# Patient Record
Sex: FEMALE | Race: WHITE | Employment: UNEMPLOYED
[De-identification: names, ages, dates, MRNs, and addresses within clinical notes are randomized per-mention and may not be internally consistent; named-entity substitution may affect disease eponyms.]

---

## 2014-05-01 LAB
CHOLEST SERPL-MCNC: 131 MG/DL (ref 0–199)
HDLC SERPL-MCNC: 36 MG/DL
LDLC SERPL CALC-MCNC: 59 MG/DL (ref 0–129)
NONHDLC SERPL-MCNC: 95 MG/DL (ref 0–159)
TRIGL SERPL-MCNC: 194 MG/DL (ref 0–149)

## 2015-05-15 LAB
HDLC SERPL-MCNC: 29 MG/DL
NONHDLC SERPL-MCNC: 89 MG/DL (ref 0–159)

## 2015-10-22 LAB
CREAT SERPL-MCNC: 0.67 MG/DL (ref 0.55–1.02)
GFR SERPL CREATININE-BSD FRML MDRD: >60 ML/MIN/1.73M2

## 2016-05-26 LAB
GLUCOSE SERPL-MCNC: 217 MG/DL (ref 70–180)
HBA1C MFR BLD: 9.2 % (ref 4.3–5.6)
POTASSIUM SERPL-SCNC: 4.6 MMOL/L (ref 3.5–5.1)

## 2016-10-07 LAB — HBA1C MFR BLD: 8.4 % (ref 4.3–5.6)

## 2017-06-17 ENCOUNTER — HEALTH MAINTENANCE LETTER (OUTPATIENT)
Age: 62
End: 2017-06-17

## 2017-06-22 LAB
CREAT SERPL-MCNC: 0.52 MG/DL (ref 0.55–1.02)
CREAT SERPL-MCNC: 0.52 MG/DL (ref 0.55–1.02)
GFR SERPL CREATININE-BSD FRML MDRD: >60 ML/MIN/1.73M2
GFR SERPL CREATININE-BSD FRML MDRD: >60 ML/MIN/1.73M2
GLUCOSE SERPL-MCNC: 380 MG/DL (ref 70–180)
HBA1C MFR BLD: 8.5 % (ref 4.3–5.6)
HDLC SERPL-MCNC: 32 MG/DL
NONHDLC SERPL-MCNC: 89 MG/DL (ref 0–149)
POTASSIUM SERPL-SCNC: 4.7 MMOL/L (ref 3.5–5.1)
POTASSIUM SERPL-SCNC: 4.7 MMOL/L (ref 3.5–5.1)

## 2017-12-08 ENCOUNTER — TRANSFERRED RECORDS (OUTPATIENT)
Dept: HEALTH INFORMATION MANAGEMENT | Facility: CLINIC | Age: 62
End: 2017-12-08

## 2017-12-08 LAB
ALT SERPL-CCNC: 42 IU/L (ref 5–46)
AST SERPL-CCNC: 66 U/L (ref 10–41)
CREAT SERPL-MCNC: 0.57 MG/DL (ref 0.6–1.4)
GFR SERPL CREATININE-BSD FRML MDRD: 99 ML/MIN/1.73M2
GLUCOSE SERPL-MCNC: 350 MG/DL (ref 65–99)
HBA1C MFR BLD: 9.5 % (ref 0–5.7)
POTASSIUM SERPL-SCNC: 4.4 MMOL/L (ref 3.5–5.2)
TSH SERPL-ACNC: 0.94 MU/L (ref 0.45–4.5)

## 2018-04-13 ENCOUNTER — TRANSFERRED RECORDS (OUTPATIENT)
Dept: HEALTH INFORMATION MANAGEMENT | Facility: CLINIC | Age: 63
End: 2018-04-13

## 2018-06-05 ENCOUNTER — APPOINTMENT (OUTPATIENT)
Dept: ULTRASOUND IMAGING | Facility: CLINIC | Age: 63
DRG: 744 | End: 2018-06-05
Attending: EMERGENCY MEDICINE
Payer: COMMERCIAL

## 2018-06-05 ENCOUNTER — TRANSFERRED RECORDS (OUTPATIENT)
Dept: HEALTH INFORMATION MANAGEMENT | Facility: CLINIC | Age: 63
End: 2018-06-05

## 2018-06-05 ENCOUNTER — ANESTHESIA EVENT (OUTPATIENT)
Dept: SURGERY | Facility: CLINIC | Age: 63
DRG: 744 | End: 2018-06-05
Payer: COMMERCIAL

## 2018-06-05 ENCOUNTER — HOSPITAL ENCOUNTER (INPATIENT)
Facility: CLINIC | Age: 63
LOS: 3 days | Discharge: HOME | DRG: 744 | End: 2018-06-08
Attending: EMERGENCY MEDICINE | Admitting: HOSPITALIST
Payer: COMMERCIAL

## 2018-06-05 DIAGNOSIS — R93.89 ABNORMAL PELVIC ULTRASOUND: ICD-10-CM

## 2018-06-05 DIAGNOSIS — N93.9 VAGINAL BLEEDING: ICD-10-CM

## 2018-06-05 DIAGNOSIS — D64.9 ANEMIA, UNSPECIFIED TYPE: Primary | ICD-10-CM

## 2018-06-05 DIAGNOSIS — D69.6 THROMBOCYTOPENIA (H): ICD-10-CM

## 2018-06-05 LAB
ABO + RH BLD: NORMAL
ABO + RH BLD: NORMAL
ANION GAP SERPL CALCULATED.3IONS-SCNC: 9 MMOL/L (ref 3–14)
APTT PPP: 35 SEC (ref 22–37)
BASOPHILS # BLD AUTO: 0 10E9/L (ref 0–0.2)
BASOPHILS NFR BLD AUTO: 0.3 %
BLD GP AB SCN SERPL QL: NORMAL
BLD PROD TYP BPU: NORMAL
BLD UNIT ID BPU: 0
BLOOD BANK CMNT PATIENT-IMP: NORMAL
BLOOD PRODUCT CODE: NORMAL
BPU ID: NORMAL
BUN SERPL-MCNC: 10 MG/DL (ref 7–30)
CALCIUM SERPL-MCNC: 8.4 MG/DL (ref 8.5–10.1)
CHLORIDE SERPL-SCNC: 107 MMOL/L (ref 94–109)
CO2 SERPL-SCNC: 25 MMOL/L (ref 20–32)
CREAT SERPL-MCNC: 0.52 MG/DL (ref 0.52–1.04)
DIFFERENTIAL METHOD BLD: ABNORMAL
EOSINOPHIL # BLD AUTO: 0.1 10E9/L (ref 0–0.7)
EOSINOPHIL NFR BLD AUTO: 3.7 %
ERYTHROCYTE [DISTWIDTH] IN BLOOD BY AUTOMATED COUNT: 14.3 % (ref 10–15)
GFR SERPL CREATININE-BSD FRML MDRD: >90 ML/MIN/1.7M2
GLUCOSE BLDC GLUCOMTR-MCNC: 241 MG/DL (ref 70–99)
GLUCOSE SERPL-MCNC: 194 MG/DL (ref 70–99)
HBA1C MFR BLD: 7.3 % (ref 0–5.6)
HCT VFR BLD AUTO: 31.8 % (ref 35–47)
HGB BLD-MCNC: 10.7 G/DL (ref 11.7–15.7)
IMM GRANULOCYTES # BLD: 0 10E9/L (ref 0–0.4)
IMM GRANULOCYTES NFR BLD: 0.3 %
INR PPP: 1.18 (ref 0.86–1.14)
LYMPHOCYTES # BLD AUTO: 1.2 10E9/L (ref 0.8–5.3)
LYMPHOCYTES NFR BLD AUTO: 36.9 %
MCH RBC QN AUTO: 33.3 PG (ref 26.5–33)
MCHC RBC AUTO-ENTMCNC: 33.6 G/DL (ref 31.5–36.5)
MCV RBC AUTO: 99 FL (ref 78–100)
MONOCYTES # BLD AUTO: 0.3 10E9/L (ref 0–1.3)
MONOCYTES NFR BLD AUTO: 7.9 %
NEUTROPHILS # BLD AUTO: 1.7 10E9/L (ref 1.6–8.3)
NEUTROPHILS NFR BLD AUTO: 50.9 %
NRBC # BLD AUTO: 0 10*3/UL
NRBC BLD AUTO-RTO: 0 /100
PLATELET # BLD AUTO: 41 10E9/L (ref 150–450)
PLATELET # BLD EST: ABNORMAL 10*3/UL
POLYCHROMASIA BLD QL SMEAR: SLIGHT
POTASSIUM SERPL-SCNC: 3.9 MMOL/L (ref 3.4–5.3)
RBC # BLD AUTO: 3.21 10E12/L (ref 3.8–5.2)
SODIUM SERPL-SCNC: 141 MMOL/L (ref 133–144)
SPECIMEN EXP DATE BLD: NORMAL
TRANSFUSION STATUS PATIENT QL: NORMAL
TRANSFUSION STATUS PATIENT QL: NORMAL
TSH SERPL DL<=0.005 MIU/L-ACNC: 1.24 MU/L (ref 0.4–4)
WBC # BLD AUTO: 3.3 10E9/L (ref 4–11)

## 2018-06-05 PROCEDURE — 25000128 H RX IP 250 OP 636: Performed by: EMERGENCY MEDICINE

## 2018-06-05 PROCEDURE — 85610 PROTHROMBIN TIME: CPT | Performed by: EMERGENCY MEDICINE

## 2018-06-05 PROCEDURE — 76856 US EXAM PELVIC COMPLETE: CPT

## 2018-06-05 PROCEDURE — 25000125 ZZHC RX 250: Performed by: HOSPITALIST

## 2018-06-05 PROCEDURE — 00000146 ZZHCL STATISTIC GLUCOSE BY METER IP

## 2018-06-05 PROCEDURE — 85060 BLOOD SMEAR INTERPRETATION: CPT | Performed by: INTERNAL MEDICINE

## 2018-06-05 PROCEDURE — 25000131 ZZH RX MED GY IP 250 OP 636 PS 637: Performed by: HOSPITALIST

## 2018-06-05 PROCEDURE — 25000128 H RX IP 250 OP 636: Performed by: HOSPITALIST

## 2018-06-05 PROCEDURE — 93005 ELECTROCARDIOGRAM TRACING: CPT

## 2018-06-05 PROCEDURE — 40000847 ZZHCL STATISTIC MORPHOLOGY W/INTERP HISTOLOGY TC 85060: Performed by: INTERNAL MEDICINE

## 2018-06-05 PROCEDURE — 86900 BLOOD TYPING SEROLOGIC ABO: CPT | Performed by: EMERGENCY MEDICINE

## 2018-06-05 PROCEDURE — 0UDB7ZZ EXTRACTION OF ENDOMETRIUM, VIA NATURAL OR ARTIFICIAL OPENING: ICD-10-PCS | Performed by: SPECIALIST

## 2018-06-05 PROCEDURE — 99223 1ST HOSP IP/OBS HIGH 75: CPT | Mod: AI | Performed by: HOSPITALIST

## 2018-06-05 PROCEDURE — 80048 BASIC METABOLIC PNL TOTAL CA: CPT | Performed by: EMERGENCY MEDICINE

## 2018-06-05 PROCEDURE — 85730 THROMBOPLASTIN TIME PARTIAL: CPT | Performed by: EMERGENCY MEDICINE

## 2018-06-05 PROCEDURE — P9037 PLATE PHERES LEUKOREDU IRRAD: HCPCS | Performed by: HOSPITALIST

## 2018-06-05 PROCEDURE — 86850 RBC ANTIBODY SCREEN: CPT | Performed by: EMERGENCY MEDICINE

## 2018-06-05 PROCEDURE — 84443 ASSAY THYROID STIM HORMONE: CPT | Performed by: EMERGENCY MEDICINE

## 2018-06-05 PROCEDURE — 93010 ELECTROCARDIOGRAM REPORT: CPT | Performed by: INTERNAL MEDICINE

## 2018-06-05 PROCEDURE — 12000000 ZZH R&B MED SURG/OB

## 2018-06-05 PROCEDURE — 86901 BLOOD TYPING SEROLOGIC RH(D): CPT | Performed by: EMERGENCY MEDICINE

## 2018-06-05 PROCEDURE — 83036 HEMOGLOBIN GLYCOSYLATED A1C: CPT | Performed by: EMERGENCY MEDICINE

## 2018-06-05 PROCEDURE — 96360 HYDRATION IV INFUSION INIT: CPT

## 2018-06-05 PROCEDURE — 99285 EMERGENCY DEPT VISIT HI MDM: CPT | Mod: 25

## 2018-06-05 PROCEDURE — 85025 COMPLETE CBC W/AUTO DIFF WBC: CPT | Performed by: EMERGENCY MEDICINE

## 2018-06-05 PROCEDURE — 25000132 ZZH RX MED GY IP 250 OP 250 PS 637: Performed by: HOSPITALIST

## 2018-06-05 RX ORDER — ALBUTEROL SULFATE 0.83 MG/ML
2.5 SOLUTION RESPIRATORY (INHALATION)
Status: DISCONTINUED | OUTPATIENT
Start: 2018-06-05 | End: 2018-06-08 | Stop reason: HOSPADM

## 2018-06-05 RX ORDER — POTASSIUM CHLORIDE 7.45 MG/ML
10 INJECTION INTRAVENOUS
Status: DISCONTINUED | OUTPATIENT
Start: 2018-06-05 | End: 2018-06-08 | Stop reason: HOSPADM

## 2018-06-05 RX ORDER — POTASSIUM CHLORIDE 1.5 G/1.58G
20-40 POWDER, FOR SOLUTION ORAL
Status: DISCONTINUED | OUTPATIENT
Start: 2018-06-05 | End: 2018-06-08 | Stop reason: HOSPADM

## 2018-06-05 RX ORDER — POTASSIUM CHLORIDE 600 MG/1
16 TABLET, FILM COATED, EXTENDED RELEASE ORAL 2 TIMES DAILY
COMMUNITY

## 2018-06-05 RX ORDER — DEXTROSE MONOHYDRATE 25 G/50ML
25-50 INJECTION, SOLUTION INTRAVENOUS
Status: DISCONTINUED | OUTPATIENT
Start: 2018-06-05 | End: 2018-06-08 | Stop reason: HOSPADM

## 2018-06-05 RX ORDER — POTASSIUM CHLORIDE 1500 MG/1
20-40 TABLET, EXTENDED RELEASE ORAL
Status: DISCONTINUED | OUTPATIENT
Start: 2018-06-05 | End: 2018-06-08 | Stop reason: HOSPADM

## 2018-06-05 RX ORDER — AMOXICILLIN 250 MG
1 CAPSULE ORAL 2 TIMES DAILY PRN
Status: DISCONTINUED | OUTPATIENT
Start: 2018-06-05 | End: 2018-06-08 | Stop reason: HOSPADM

## 2018-06-05 RX ORDER — ACETAMINOPHEN 325 MG/1
650 TABLET ORAL EVERY 4 HOURS PRN
Status: DISCONTINUED | OUTPATIENT
Start: 2018-06-05 | End: 2018-06-08 | Stop reason: HOSPADM

## 2018-06-05 RX ORDER — POTASSIUM CHLORIDE 1500 MG/1
20 TABLET, EXTENDED RELEASE ORAL 2 TIMES DAILY
Status: DISCONTINUED | OUTPATIENT
Start: 2018-06-05 | End: 2018-06-08 | Stop reason: HOSPADM

## 2018-06-05 RX ORDER — NICOTINE POLACRILEX 4 MG
15-30 LOZENGE BUCCAL
Status: DISCONTINUED | OUTPATIENT
Start: 2018-06-05 | End: 2018-06-08 | Stop reason: HOSPADM

## 2018-06-05 RX ORDER — ONDANSETRON 2 MG/ML
4 INJECTION INTRAMUSCULAR; INTRAVENOUS EVERY 6 HOURS PRN
Status: DISCONTINUED | OUTPATIENT
Start: 2018-06-05 | End: 2018-06-08 | Stop reason: HOSPADM

## 2018-06-05 RX ORDER — SODIUM CHLORIDE 9 MG/ML
INJECTION, SOLUTION INTRAVENOUS CONTINUOUS
Status: DISCONTINUED | OUTPATIENT
Start: 2018-06-06 | End: 2018-06-06

## 2018-06-05 RX ORDER — POTASSIUM CHLORIDE 600 MG/1
16 TABLET, FILM COATED, EXTENDED RELEASE ORAL 2 TIMES DAILY
Status: DISCONTINUED | OUTPATIENT
Start: 2018-06-05 | End: 2018-06-05

## 2018-06-05 RX ORDER — ACETAMINOPHEN 650 MG/1
650 SUPPOSITORY RECTAL EVERY 4 HOURS PRN
Status: DISCONTINUED | OUTPATIENT
Start: 2018-06-05 | End: 2018-06-08 | Stop reason: HOSPADM

## 2018-06-05 RX ORDER — POTASSIUM CHLORIDE 600 MG/1
8 TABLET, FILM COATED, EXTENDED RELEASE ORAL DAILY
Status: DISCONTINUED | OUTPATIENT
Start: 2018-06-05 | End: 2018-06-05

## 2018-06-05 RX ORDER — LEVOTHYROXINE SODIUM 50 UG/1
50 TABLET ORAL EVERY EVENING
Status: DISCONTINUED | OUTPATIENT
Start: 2018-06-05 | End: 2018-06-08 | Stop reason: HOSPADM

## 2018-06-05 RX ORDER — AMOXICILLIN 250 MG
2 CAPSULE ORAL 2 TIMES DAILY PRN
Status: DISCONTINUED | OUTPATIENT
Start: 2018-06-05 | End: 2018-06-08 | Stop reason: HOSPADM

## 2018-06-05 RX ORDER — POLYETHYLENE GLYCOL 3350 17 G/17G
17 POWDER, FOR SOLUTION ORAL DAILY PRN
Status: DISCONTINUED | OUTPATIENT
Start: 2018-06-05 | End: 2018-06-08 | Stop reason: HOSPADM

## 2018-06-05 RX ORDER — PROCHLORPERAZINE MALEATE 5 MG
10 TABLET ORAL EVERY 6 HOURS PRN
Status: DISCONTINUED | OUTPATIENT
Start: 2018-06-05 | End: 2018-06-08 | Stop reason: HOSPADM

## 2018-06-05 RX ORDER — NALOXONE HYDROCHLORIDE 0.4 MG/ML
.1-.4 INJECTION, SOLUTION INTRAMUSCULAR; INTRAVENOUS; SUBCUTANEOUS
Status: DISCONTINUED | OUTPATIENT
Start: 2018-06-05 | End: 2018-06-08 | Stop reason: HOSPADM

## 2018-06-05 RX ORDER — LIDOCAINE 40 MG/G
CREAM TOPICAL
Status: DISCONTINUED | OUTPATIENT
Start: 2018-06-05 | End: 2018-06-08 | Stop reason: HOSPADM

## 2018-06-05 RX ORDER — HYDROMORPHONE HYDROCHLORIDE 2 MG/1
4 TABLET ORAL EVERY 4 HOURS PRN
Status: DISCONTINUED | OUTPATIENT
Start: 2018-06-05 | End: 2018-06-08 | Stop reason: HOSPADM

## 2018-06-05 RX ORDER — POTASSIUM CHLORIDE 600 MG/1
8 TABLET, FILM COATED, EXTENDED RELEASE ORAL DAILY
COMMUNITY
End: 2018-07-19

## 2018-06-05 RX ORDER — BISACODYL 10 MG
10 SUPPOSITORY, RECTAL RECTAL DAILY PRN
Status: DISCONTINUED | OUTPATIENT
Start: 2018-06-05 | End: 2018-06-08 | Stop reason: HOSPADM

## 2018-06-05 RX ORDER — GABAPENTIN 600 MG/1
1200 TABLET ORAL 3 TIMES DAILY
Status: DISCONTINUED | OUTPATIENT
Start: 2018-06-05 | End: 2018-06-08 | Stop reason: HOSPADM

## 2018-06-05 RX ORDER — POTASSIUM CL/LIDO/0.9 % NACL 10MEQ/0.1L
10 INTRAVENOUS SOLUTION, PIGGYBACK (ML) INTRAVENOUS
Status: DISCONTINUED | OUTPATIENT
Start: 2018-06-05 | End: 2018-06-08 | Stop reason: HOSPADM

## 2018-06-05 RX ORDER — PROCHLORPERAZINE 25 MG
25 SUPPOSITORY, RECTAL RECTAL EVERY 12 HOURS PRN
Status: DISCONTINUED | OUTPATIENT
Start: 2018-06-05 | End: 2018-06-08 | Stop reason: HOSPADM

## 2018-06-05 RX ORDER — POTASSIUM CHLORIDE 29.8 MG/ML
20 INJECTION INTRAVENOUS
Status: DISCONTINUED | OUTPATIENT
Start: 2018-06-05 | End: 2018-06-08 | Stop reason: HOSPADM

## 2018-06-05 RX ORDER — ONDANSETRON 4 MG/1
4 TABLET, ORALLY DISINTEGRATING ORAL EVERY 6 HOURS PRN
Status: DISCONTINUED | OUTPATIENT
Start: 2018-06-05 | End: 2018-06-08 | Stop reason: HOSPADM

## 2018-06-05 RX ADMIN — INSULIN ASPART 3 UNITS: 100 INJECTION, SOLUTION INTRAVENOUS; SUBCUTANEOUS at 19:24

## 2018-06-05 RX ADMIN — SODIUM CHLORIDE 1000 ML: 9 INJECTION, SOLUTION INTRAVENOUS at 13:45

## 2018-06-05 RX ADMIN — PHYTONADIONE 5 MG: 10 INJECTION, EMULSION INTRAMUSCULAR; INTRAVENOUS; SUBCUTANEOUS at 22:22

## 2018-06-05 RX ADMIN — INSULIN DETEMIR 20 UNITS: 100 INJECTION, SOLUTION SUBCUTANEOUS at 22:23

## 2018-06-05 RX ADMIN — GABAPENTIN 1200 MG: 600 TABLET, FILM COATED ORAL at 19:02

## 2018-06-05 RX ADMIN — GABAPENTIN 1200 MG: 600 TABLET, FILM COATED ORAL at 22:23

## 2018-06-05 RX ADMIN — LEVOTHYROXINE SODIUM 50 MCG: 50 TABLET ORAL at 20:27

## 2018-06-05 RX ADMIN — POTASSIUM CHLORIDE 20 MEQ: 1500 TABLET, EXTENDED RELEASE ORAL at 20:28

## 2018-06-05 ASSESSMENT — ENCOUNTER SYMPTOMS
DIARRHEA: 0
FREQUENCY: 0
NAUSEA: 0
ABDOMINAL PAIN: 1
DIFFICULTY URINATING: 0
DIZZINESS: 1
DYSURIA: 0
VOMITING: 0

## 2018-06-05 ASSESSMENT — COPD QUESTIONNAIRES: COPD: 1

## 2018-06-05 ASSESSMENT — LIFESTYLE VARIABLES: TOBACCO_USE: 1

## 2018-06-05 NOTE — ANESTHESIA PREPROCEDURE EVALUATION
EKG  05-JUN-2018 17:48:13 [facility]-S6 SO ROUTINE RECORD  Sinus rhythm  Left axis deviation  Septal infarct (cited on or before 27-APR-2011)  Possible Lateral infarct (cited on or before 27-APR-2011)  Abnormal ECG  When compared with ECG of 15-SEP-2013 05:38,  Premature ventricular complexes are no longer Present  Questionable change in initial forces of Anterolateral leads    HGB 10.7 6/5/2018@1300  PLT 41 6/5/2018 @1300  Anesthesia Evaluation     . Pt has had prior anesthetic.            ROS/MED HX    ENT/Pulmonary:     (+)sleep apnea, tobacco use, Current use Intermittent asthma COPD, , . .    Neurologic: Comment: Hx of delirium    (+)neuropathy     Cardiovascular: Comment: S/p right lower extremity amputation    (+) -Peripheral Vascular Disease---. : . . . :. .       METS/Exercise Tolerance:     Hematologic: Comments: Pancytopenia - hem/onc consulted  Thrombocytopenia    Chronic anemia with hemoglobin about 10 since 2013    (+) Anemia, History of Transfusion -      Musculoskeletal: Comment: Chronic left heel ulcer  Charcot foot s/p BKA    Patient has hard time laying flat, could not lay flat for pelvic exam        GI/Hepatic:         Renal/Genitourinary:         Endo:     (+) type II DM Using insulin Diabetic complications: neuropathy, thyroid problem hypothyroidism, Obesity, .      Psychiatric:     (+) psychiatric history depression      Infectious Disease:   (+) MRSA,       Malignancy:         Other: Comment: Ultrasound with thickened endometrial stripe  Vaginal bleeding   (+) H/O Chronic Pain,H/O chronic opiod use ,                    Physical Exam      Airway   Mallampati: III  TM distance: >3 FB  Neck ROM: full    Dental   (+) chipped and missing    Cardiovascular   Rhythm and rate: regular and normal      Pulmonary (+) decreased breath sounds                       Anesthesia Plan      History & Physical Review  History and physical reviewed and following examination; no interval  "change.    ASA Status:  4 .    NPO Status:  > 8 hours    Plan for General and ETT with Intravenous and Propofol induction. Maintenance will be Balanced.    PONV prophylaxis:  Ondansetron (or other 5HT-3) and Dexamethasone or Solumedrol  Additional equipment: Videolaryngoscope Discussed timing of procedure with Dr. Ortega given thrombocytopenia of unknown etiology, however Dr. Ortega says \"she can't wait, she probably has cancer.\"  We will proceed to OR for D and C.      Postoperative Care  Postoperative pain management:  IV analgesics and Oral pain medications.      Consents  Anesthetic plan, risks, benefits and alternatives discussed with:  Patient..                          .  "

## 2018-06-05 NOTE — ED PROVIDER NOTES
History     Chief Complaint:  Vaginal bleeding    HPI   Fani Escobedo is a 63 year old female who presents with vaginal bleeding.  The patient says that she has been having on and off vaginal bleeding for the past couple years.  For the past 10 days, the patient has been having increased vaginal bleeding and clotting, and she says she needs to change a pad every couple hours prompting ED visit.  Additionally, the patient says that she has been having lightheadedness/dizziness for the past 7 days.  Here in the ED, patient says that she has abdominal pain consistent with menstrual cramps but denies any difficulty urinating, dysuria, increased frequency of urination, nausea, vomiting, or diarrhea.    Allergies:  Adhesive Tape  Cephalosporins  Naproxen  Augmentin  Benadryl [Anti-Itch]  Penicillins  Morphine Hcl    Medications:    ASPIRIN ADULT LOW STRENGTH PO  B Complex Vitamins (VITAMIN  B COMPLEX) tablet  calcium carb 1250 mg, 500 mg [place],/vitamin D 200 units (OSCAL WITH D) 500-200 MG-UNIT per tablet  fenofibrate 145 MG tablet  ferrous sulfate 325 (65 FE) MG tablet  Furosemide (LASIX) 20 MG tablet  gabapentin (NEURONTIN) 400 MG capsule  Glimepiride (AMARYL PO)  HYDROmorphone (DILAUDID) 4 MG tablet  insulin aspart (NovoLOG) injection  Lactobacillus Rhamnosus, GG, (CULTURELLE) capsule  LEVOTHYROXINE SODIUM PO  lidocaine (LIDODERM) 5 % patch  METFORMIN HCL PO  miconazole (MICATIN; MICRO GUARD) 2 % powder  pantoprazole (PROTONIX) 40 MG enteric coated tablet  potassium chloride SA (K-DUR,KLOR-CON M) 20 MEQ tablet  senna-docusate (SENOKOT-S;PERICOLACE) 8.6-50 MG per tablet  VITAMIN D, CHOLECALCIFEROL, PO    Past Medical History:    Anemia   Asthma   Charcot foot due to diabetes mellitus (H)   COPD (chronic obstructive pulmonary disease) (H)   Delirium   Depression   Diabetes mellitus (H)   Diskitis   Hypothyroid   Obesity   USHA (obstructive sleep apnea)   Osteomyelitis (H)   Peripheral neuropathy   Sepsis (H)   Tobacco  "abuse    Past Surgical History:    Amputate leg  Amputate toe  Explore spine, removal hardware  Fusion spine anterior thoracic 1 level  Incision and drainage rectum  Incision and drainage spine  Irrigation and debridement trunk  Optical tracking system fusion posterior spine thoracic 3+ levels    Family History:    History reviewed. No pertinent family history.     Social History:  Smoking Status: Current Smoker  Alcohol Use: yes  Patient presents with .  Marital Status:   [2]      Review of Systems   Gastrointestinal: Positive for abdominal pain. Negative for diarrhea, nausea and vomiting.   Genitourinary: Positive for vaginal bleeding. Negative for difficulty urinating, dysuria and frequency.   Neurological: Positive for dizziness.   All other systems reviewed and are negative.    Physical Exam   First Vitals:  BP: 148/66  Heart Rate: 95  Temp: 98.4  F (36.9  C)  Resp: 16  Height: 175.3 cm (5' 9\")  Weight: 119.7 kg (264 lb)  SpO2: 96 %      Physical Exam  Physical Exam   General:  Sitting on bed with significant other at bedside.   HENT:  No obvious trauma to head  Right Ear:  External ear normal.   Left Ear:  External ear normal.   Nose:  Nose normal.   Eyes:  Conjunctivae and EOM are normal. Pupils are equal, round, and reactive.   Neck: Normal range of motion. Neck supple. No tracheal deviation present.   CV:  Normal heart sounds. No murmur heard.  Pulm/Chest: Effort normal and breath sounds normal.   Abd: Soft. No distension. There is no tenderness. There is no rigidity, no rebound and no guarding.   M/S: Normal range of motion. Right leg BKA.  Neuro: Alert. GCS 15.  Skin: Skin is warm and dry. No rash noted. Not diaphoretic.   Psych: Normal mood and affect. Behavior is normal.   Pelvic: (exam performed with Tip female tech): No external genital lesions.  Scant blood in vaginal vault.  Cervix is visualized without any gross abnormality.  There is scant blood coming from the cervical office.  " No discharge.  No adnexal pain bilaterally.  Slight uterine tenderness but no cervical motion tenderness.    Emergency Department Course     Imaging:  Radiographic findings were communicated with the patient who voiced understanding of the findings.  US Pelvic Complete with transvaginal,:  1. The endometrium is heterogeneous and abnormally thickened at 1.8 cm. Endometrial malignancy could have this appearance, and gynecologic consultation is recommended.  2. Small amount of nonspecific free fluid in the pelvis.  3. Nonvisualization of the ovaries.   As per radiology.     Laboratory:  CBC: WBC: 3.3 low, HGB: 10.7 low, PLT: 41 low    BMP: Glucose 194 high, calcium 8.4 low, o/w WNL (Creatinine: 0.52)    ABO/Rh type and screen: ABO O, Rh+, antibody screen negative    Interventions:  1345 NS 1L IV    Emergency Department Course:  Nursing notes and vitals reviewed.  I performed an exam of the patient as documented above.     IV inserted. Medicine administered as documented above. Blood drawn. This was sent to the lab for further testing, results above.    The patient was sent for a US while in the emergency department, findings above.     1505 I rechecked the patient.     1511: I discussed the case with Dr. Ortega, [facility], regarding the patient.    1525  I consulted with Dr. Moa of the hospitalist services. They are in agreement to accept the patient for admission.    Findings and plan explained to the Patient who consents to admission. Discussed the patient with Dr. Mao, who will admit the patient to a inpatient med bed for further monitoring, evaluation, and treatment.      Impression & Plan    Medical Decision Making:  Fani Escobedo is a very pleasant 63 year old year old patient who presents to the emergency department with concern of vaginal bleeding.  The patient has had this on and off for the past several months.  She had this worked up in [place] for this.  They attempted perform a  endometrial biopsy but the patient could not tolerate it.  She was also told her platelets were too low.  The patient has had bleeding for the past 10 days and over the past 7 days she has had increased lightheadedness.  She takes aspirin every 3-4 days.  She is not on any other anticoagulation.  She reports going through  a pad every 2 hours while awake.  Fortunately the patient's hemoglobin is normal but she does indeed have evidence of thrombocytopenia.  The ultrasound shows above findings.  This is concerning for endometrial cancer or other acute pathology.  Due to the thrombocytopenia,  persistent bleeding, new lightheadedness, and inability to follow-up in the outpatient setting which is already tried 3 months ago, the patient will be admitted to the hospital.  I spoke to the hospitalist  who has agreed to admit the patient for continued evaluation and treatment.  I also spoke to the gynecologist on call, Dr. Ortega, who is agreed to participate in the care of the patient.  They recommended admission with planned biopsy under MAC.  The patient's hemoglobin is not less than 7 and she is not hemodynamically unstable and I do not believe PRBC transfusion as needed. Dr. Mao agrees to consult oncology/hematology regarding the patient's thrombocytopenia.    Diagnosis:    ICD-10-CM    1. Vaginal bleeding N93.9    2. Abnormal pelvic ultrasound R93.8    3. Thrombocytopenia (H) D69.6        Disposition:  Admitted to inpatient med bed    Eduardo Haynes  6/5/2018    EMERGENCY DEPARTMENT  Eduardo MONTILLA, am serving as a scribe at 1:32 PM on 6/5/2018 to document services personally performed by Seun Flores DO based on my observations and the provider's statements to me.        Seun Flores DO  06/05/18 5025

## 2018-06-05 NOTE — IP AVS SNAPSHOT
[facility] [facility] [address] [facility]    [address] [place] [address]    Phone:  [phone]                                       After Visit Summary   6/5/2018    Fani Escobedo    MRN: [id number]           After Visit Summary Signature Page     I have received my discharge instructions, and my questions have been answered. I have discussed any challenges I see with this plan with the nurse or doctor.    ..........................................................................................................................................  Patient/Patient Representative Signature      ..........................................................................................................................................  Patient Representative Print Name and Relationship to Patient    ..................................................               ................................................  Date                                            Time    ..........................................................................................................................................  Reviewed by Signature/Title    ...................................................              ..............................................  Date                                                            Time

## 2018-06-05 NOTE — IP AVS SNAPSHOT
MRN:[id number]                      After Visit Summary   6/5/2018    Fani Escobedo    MRN: [id number]           Thank you!     Thank you for choosing [facility] for your care. Our goal is always to provide you with excellent care. Hearing back from our patients is one way we can continue to improve our services. Please take a few minutes to complete the written survey that you may receive in the mail after you visit with us. Thank you!        Patient Information     Date Of Birth          1955        Designated Caregiver       Most Recent Value    Caregiver    Will someone help with your care after discharge? yes    Name of designated caregiver     Phone number of caregiver     Caregiver address       About your hospital stay     You were admitted on:  June 5, 2018 You last received care in the:  [place] [address] [facility]    You were discharged on:  June 8, 2018        Reason for your hospital stay       Evaluation of your vaginal bleeding and low blood counts, which required additional procedures (D&C, bone marrow biopsy).                  Who to Call     For medical emergencies, please call 911.  For non-urgent questions about your medical care, please call your primary care provider or clinic, [phone]  For questions related to your surgery, please call your surgery clinic        Attending Provider     Provider Specialty    Seun Flores DO Emergency Medicine    Chintan Mao MD Internal Medicine       Primary Care Provider Office Phone # Fax #    [facility] [phone]      After Care Instructions     Activity       Your activity upon discharge: activity as tolerated            Diet       Follow this diet upon discharge: Moderate consistent carbohydrate (4098-1888 queenie / 4-6 CHO units per meal)                  Follow-up Appointments     Follow-up and recommended labs and tests        1. Follow up with your  PCP in the next 5-7 days with repeat CBC.  2. Follow up with Roel Heme/Onc in clinic as advised to review the results of your bone marrow biopsy.  3. Follow up with Dr. Ortega in OB/Gyn clinic as needed.            Follow-up and recommended labs and tests        Appointment with Marielos Nicholson 6/19 at 11:20 am                  Your next 10 appointments already scheduled     Jun 19, 2018  2:00 PM CDT   Return Visit with  Oncology Nurse   [facility] ([facility])    [facility]  [address]  [facility] [address]   [phone]            Jun 19, 2018  3:00 PM CDT   New Visit with Sheeba Montana MD   [facility] ([facility])    [facility]  [address]  [facility] [address]   [phone]              Further instructions from your care team       Lt plantar foot wounds  1. F/U with [facility] as scheduled  2. Continue tx plan as outline by [facility]  3. Pt has supplies for discharge  4. Breast folds: pt has Interdry sheet for discharge  5. Coccyx: barrier ointment (ex; Desitin).  Reposition frequently     Pending Results     Date and Time Order Name Status Description    6/8/2018 0824 Process and hold DNA In process     6/7/2018 1036 Leukemia Lymphoma Evaluation (Flow Cytometry) In process     6/7/2018 0001 Protein Immunofixation Serum In process     6/7/2018 0001 Protein electrophoresis In process     6/6/2018 1628 CHROMOSOME BONE MARROW With Professional Interpretation In process     6/6/2018 1628 Bone marrow biopsy In process     6/5/2018 1718 Platelets prepare order unit In process             Statement of Approval     Ordered          06/08/18 1055  I have reviewed and agree with all the recommendations and orders detailed in this document.  EFFECTIVE NOW     Approved and electronically signed by:  Sola Vizcarra DO             Admission Information     Date &  "Time Provider Department Dept. Phone    2018 Chintan Mao MD [place] [address] [facility] [phone]      Your Vitals Were     Blood Pressure Pulse Temperature Respirations Height Weight    111/49 (BP Location: Left arm) 69 98.6  F (37  C) (Oral) 16 1.798 m (5' 10.8\") 124.7 kg (274 lb 14.6 oz)    Pulse Oximetry BMI (Body Mass Index)                94% 38.56 kg/m2          [employer] Information     [employer] lets you send messages to your doctor, view your test results, renew your prescriptions, schedule appointments and more. To sign up, go to [email or web address] . Click on \"Log in\" on the left side of the screen, which will take you to the Welcome page. Then click on \"Sign up Now\" on the right side of the page.     You will be asked to enter the access code listed below, as well as some personal information. Please follow the directions to create your username and password.     Your access code is: [email or web address]  Expires: 2018 11:37 AM     Your access code will  in 90 days. If you need help or a new code, please call your [facility] clinic or [phone].        Care EveryWhere ID     This is your Care EveryWhere ID. This could be used by other organizations to access your [facility] medical records  [id number]        Equal Access to Services     HENRY BENITEZ AH: Hadii bridget terryo Soreagan, waaxda luqadaha, qaybta kaalmada adeegyada, grace warner. So [facility] [phone].    ATENCIÓN: Si habla español, tiene a hayes disposición servicios gratuitos de asistencia lingüística. Alee limon [phone].    We comply with applicable federal civil rights laws and [place] laws. We do not discriminate on the basis of race, color, national origin, age, disability, sex, sexual orientation, or gender identity.               Review of your medicines      CONTINUE these medicines which may have CHANGED, or have new prescriptions. If we are uncertain of the size of " tablets/capsules you have at home, strength may be listed as something that might have changed.        Dose / Directions    HYDROmorphone 4 MG tablet   Commonly known as:  DILAUDID   This may have changed:  reasons to take this   Used for:  Discitis        Dose:  4 mg   Take 1 tablet (4 mg) by mouth every 4 hours as needed   Quantity:  90 tablet   Refills:  0         CONTINUE these medicines which have NOT CHANGED        Dose / Directions    ACIDOPHILUS PO        Dose:  1 capsule   Take 1 capsule by mouth daily   Refills:  0       ASPIRIN ADULT LOW STRENGTH PO        Dose:  81 mg   Take 81 mg by mouth twice a week   Refills:  0       Calcium carb-Vitamin D 500 mg [place]-200 units 500-200 MG-UNIT per tablet   Commonly known as:  OSCAL with D;Oyster Shell Calcium   Used for:  Wound disruption, post-op, skin, initial encounter        Dose:  1 tablet   Take 1 tablet by mouth 3 times daily (with meals)   Quantity:  90 tablet   Refills:  0       ferrous sulfate 325 (65 Fe) MG tablet   Commonly known as:  IRON        Dose:  1 tablet   Take 1 tablet by mouth 3 times daily (with meals)   Refills:  0       GABAPENTIN PO        Dose:  1200 mg   Take 1,200 mg by mouth 3 times daily 2 x 600 mg tab   Refills:  0       * KLOR-CON 8 MEQ CR tablet   Generic drug:  potassium chloride        Dose:  8 mEq   Take 8 mEq by mouth daily in the afternoon   Refills:  0       * KLOR-CON 8 MEQ CR tablet   Generic drug:  potassium chloride        Dose:  16 mEq   Take 16 mEq by mouth 2 times daily AM and PM in addition to afternoon dose   Refills:  0       LEVEMIR FLEXPEN/FLEXTOUCH 100 UNIT/ML injection   Generic drug:  insulin detemir        Dose:  42 Units   Inject 42 Units Subcutaneous 2 times daily   Refills:  0       LEVOTHYROXINE SODIUM PO        Dose:  50 mcg   Take 50 mcg by mouth daily   Refills:  0       METFORMIN HCL PO        Dose:  1000 mg   Take 1,000 mg by mouth 2 times daily (with meals)   Refills:  0       * NOVOLOG SC         Dose:  28 Units   Inject 28 Units Subcutaneous daily (with breakfast)   Refills:  0       * NOVOLOG SC        Dose:  30 Units   Inject 30 Units Subcutaneous daily (with lunch)   Refills:  0       * NOVOLOG SC        Dose:  28 Units   Inject 28 Units Subcutaneous daily (with dinner)   Refills:  0       sulfamethoxazole-trimethoprim 800-160 MG per tablet   Commonly known as:  BACTRIM DS/SEPTRA DS   Indication:  History of Osteomyelitis.        Dose:  1 tablet   Take 1 tablet by mouth 2 times daily   Refills:  0       vitamin B complex with vitamin C Tabs tablet   Commonly known as:  STRESS TAB        Dose:  1 tablet   Take 1 tablet by mouth daily.   Refills:  0       * Notice:  This list has 5 medication(s) that are the same as other medications prescribed for you. Read the directions carefully, and ask your doctor or other care provider to review them with you.             Protect others around you: Learn how to safely use, store and throw away your medicines at www.disposemymeds.org.        ANTIBIOTIC INSTRUCTION     You've Been Prescribed an Antibiotic - Now What?  Your healthcare team thinks that you or your loved one might have an infection. Some infections can be treated with antibiotics, which are powerful, life-saving drugs. Like all medications, antibiotics have side effects and should only be used when necessary. There are some important things you should know about your antibiotic treatment.      Your healthcare team may run tests before you start taking an antibiotic.    Your team may take samples (e.g., from your blood, urine or other areas) to run tests to look for bacteria. These test can be important to determine if you need an antibiotic at all and, if you do, which antibiotic will work best.      Within a few days, your healthcare team might change or even stop your antibiotic.    Your team may start you on an antibiotic while they are working to find out what is making you sick.    Your team might  change your antibiotic because test results show that a different antibiotic would be better to treat your infection.    In some cases, once your team has more information, they learn that you do not need an antibiotic at all. They may find out that you don't have an infection, or that the antibiotic you're taking won't work against your infection. For example, an infection caused by a virus can't be treated with antibiotics. Staying on an antibiotic when you don't need it is more likely to be harmful than helpful.      You may experience side effects from your antibiotic.    Like all medications, antibiotics have side effects. Some of these can be serious.    Let you healthcare team know if you have any known allergies when you are admitted to the hospital.    One significant side effect of nearly all antibiotics is the risk of severe and sometimes deadly diarrhea caused by Clostridium difficile (C. Difficile). This occurs when a person takes antibiotics because some good germs are destroyed. Antibiotic use allows C. diificile to take over, putting patients at high risk for this serious infection.    As a patient or caregiver, it is important to understand your or your loved one's antibiotic treatment. It is especially important for caregivers to speak up when patients can't speak for themselves. Here are some important questions to ask your healthcare team.    What infection is this antibiotic treating and how do you know I have that infection?    What side effects might occur from this antibiotic?    How long will I need to take this antibiotic?    Is it safe to take this antibiotic with other medications or supplements (e.g., vitamins) that I am taking?     Are there any special directions I need to know about taking this antibiotic? For example, should I take it with food?    How will I be monitored to know whether my infection is responding to the antibiotic?    What tests may help to make sure the right  antibiotic is prescribed for me?      Information provided by:  www.cdc.gov/getsmart  U.S. Department of Health and Human Services  Centers for disease Control and Prevention  National Center for Emerging and Zoonotic Infectious Diseases  Division of Healthcare Quality Promotion             Medication List: This is a list of all your medications and when to take them. Check marks below indicate your daily home schedule. Keep this list as a reference.      Medications           Morning Afternoon Evening Bedtime As Needed    ACIDOPHILUS PO   Take 1 capsule by mouth daily                                ASPIRIN ADULT LOW STRENGTH PO   Take 81 mg by mouth twice a week                                Calcium carb-Vitamin D 500 mg [place]-200 units 500-200 MG-UNIT per tablet   Commonly known as:  OSCAL with D;Oyster Shell Calcium   Take 1 tablet by mouth 3 times daily (with meals)                                ferrous sulfate 325 (65 Fe) MG tablet   Commonly known as:  IRON   Take 1 tablet by mouth 3 times daily (with meals)                                GABAPENTIN PO   Take 1,200 mg by mouth 3 times daily 2 x 600 mg tab   Last time this was given:  1,200 mg on 6/8/2018  8:27 AM                                HYDROmorphone 4 MG tablet   Commonly known as:  DILAUDID   Take 1 tablet (4 mg) by mouth every 4 hours as needed                                * KLOR-CON 8 MEQ CR tablet   Take 8 mEq by mouth daily in the afternoon   Generic drug:  potassium chloride                                * KLOR-CON 8 MEQ CR tablet   Take 16 mEq by mouth 2 times daily AM and PM in addition to afternoon dose   Generic drug:  potassium chloride                                LEVEMIR FLEXPEN/FLEXTOUCH 100 UNIT/ML injection   Inject 42 Units Subcutaneous 2 times daily   Last time this was given:  42 Units on 6/8/2018  8:26 AM   Generic drug:  insulin detemir                                LEVOTHYROXINE SODIUM PO   Take 50 mcg by mouth daily   Last  time this was given:  50 mcg on 6/7/2018  9:51 PM                                METFORMIN HCL PO   Take 1,000 mg by mouth 2 times daily (with meals)                                * NOVOLOG SC   Inject 28 Units Subcutaneous daily (with breakfast)                                * NOVOLOG SC   Inject 30 Units Subcutaneous daily (with lunch)                                * NOVOLOG SC   Inject 28 Units Subcutaneous daily (with dinner)                                sulfamethoxazole-trimethoprim 800-160 MG per tablet   Commonly known as:  BACTRIM DS/SEPTRA DS   Take 1 tablet by mouth 2 times daily   Last time this was given:  1 tablet on 6/8/2018  8:27 AM                                vitamin B complex with vitamin C Tabs tablet   Commonly known as:  STRESS TAB   Take 1 tablet by mouth daily.                                * Notice:  This list has 5 medication(s) that are the same as other medications prescribed for you. Read the directions carefully, and ask your doctor or other care provider to review them with you.

## 2018-06-05 NOTE — PHARMACY-ADMISSION MEDICATION HISTORY
Admission Medication History    Admission medication history interview status for the 6/5/2018 admission is complete. See EPIC admission navigator for prior to admission medications     Medication history source reliability:Good    Actions taken by pharmacist (provider contacted, etc):None     Additional medication history information not noted on PTA med list :None    Medication reconciliation/reorder completed by provider prior to medication history? No    Time spent in this activity: 20 minutes    Prior to Admission medications    Medication Sig Last Dose Taking? Auth Provider   ASPIRIN ADULT LOW STRENGTH PO Take 81 mg by mouth twice a week  Past Week at Unknown time Yes Reported, Patient   B Complex Vitamins (VITAMIN  B COMPLEX) tablet Take 1 tablet by mouth daily. 6/4/2018 at Midday Yes Reported, Patient   calcium carb 1250 mg, 500 mg [place],/vitamin D 200 units (OSCAL WITH D) 500-200 MG-UNIT per tablet Take 1 tablet by mouth 3 times daily (with meals) 6/5/2018 at x1 Yes Charlene Alonso, SHAUNA CNP   ferrous sulfate 325 (65 FE) MG tablet Take 1 tablet by mouth 3 times daily (with meals)  6/5/2018 at x1 Yes Unknown, Entered By History   GABAPENTIN PO Take 1,200 mg by mouth 3 times daily 2 x 600 mg tab 6/5/2018 at x1 Yes Unknown, Entered By History   Insulin Aspart (NOVOLOG SC) Inject 28 Units Subcutaneous daily (with breakfast) 6/5/2018 at AM Yes Unknown, Entered By History   Insulin Aspart (NOVOLOG SC) Inject 30 Units Subcutaneous daily (with lunch) 6/4/2018 at Lunch Yes Unknown, Entered By History   Insulin Aspart (NOVOLOG SC) Inject 28 Units Subcutaneous daily (with dinner) 6/4/2018 at supper Yes Unknown, Entered By History   insulin detemir (LEVEMIR FLEXPEN/FLEXTOUCH) 100 UNIT/ML injection Inject 42 Units Subcutaneous 2 times daily 6/5/2018 at AM Yes Unknown, Entered By History   Lactobacillus (ACIDOPHILUS PO) Take 1 capsule by mouth daily 6/4/2018 at PM Yes Unknown, Entered By History   LEVOTHYROXINE SODIUM  "PO Take 50 mcg by mouth daily  6/4/2018 at PM Yes Reported, Patient   METFORMIN HCL PO Take 1,000 mg by mouth 2 times daily (with meals) 6/5/2018 at AM Yes Unknown, Entered By History   potassium chloride (KLOR-CON) 8 MEQ CR tablet Take 8 mEq by mouth daily in the afternoon 6/4/2018 at Afternoon Yes Unknown, Entered By History   potassium chloride (KLOR-CON) 8 MEQ CR tablet Take 16 mEq by mouth 2 times daily AM and PM in addition to afternoon dose 6/5/2018 at AM Yes Unknown, Entered By History   HYDROmorphone (DILAUDID) 4 MG tablet Take 1 tablet (4 mg) by mouth every 4 hours as needed  Patient taking differently: Take 4 mg by mouth every 4 hours as needed (\"Really bad pain\")  More than a month at [facility] PA-C David S. Cline, PharmD      "

## 2018-06-05 NOTE — H&P
[facility]    History and Physical  Hospitalist       Date of Admission:  6/5/2018    Assessment & Plan   Fani Escobedo is a 63 year old female with past history COPD, DM II, USHA, hypothyroid, anemia, chronic pain on chronic narcotics who presents with vaginal bleeding and pancytopenia.    Vaginal bleeding: Intermittent x2 years with constant bleeding associated with lightheadedness and fatigue x7 days.  Pelvic US shows endometrial thickening of 1.8 cm.  Pancytopenic as below.  - NPO midnight for biopsy under MAC anesthesia  - OB/Gyn consult  - transfuse 1 unit platelets (goal >50K prior to procedure)  - obtain EKG for pre-op evaluation  - outside records have been requested  ADDENDUM:  EKG reviewed, unchanged from baseline with T-wave inversion in lead III; NSQIP risk calculator approximates 1.1% risk of geronimo-op cardiac complications; currently cleared to proceed.  INR slightly elevated at 1.18, will give Vit K 5 mg x1 and repeat in AM    Pancytopenia:  History of intermittently low cell counts per review of CareEverywhere.  Low hgb and platelets possibly due to loss but patient reports these counts were low prior to onset of severe bleeding.  - B12 and folate, TSH, iron studies, peripheral smear  - Heme/Onc consult  - platelet transfusion as above    COPD: No signs of acute exacerbation, not on maintenance medications.  -As needed albuterol neb    Diabetes mellitus type 2 with neuropathy: No recent hemoglobin A1c.  Prior to admission regimen includes detemir 42 units twice daily, aspart 28 units with breakfast, 30 units with lunch, 28 units with dinner.  - Check A1c  - Decrease detemir to 20 units for this evening, decrease mealtime insulin to 20 units; increase as appropriate following procedure    Obstructive sleep apnea: Does not use CPAP.    Hypothyroidism: Continue prior to admission Synthroid.    Chronic left heel ulcer: Followed by wound clinic  -WOC consult    Chronic pain on chronic  narcotics: Continue prior to admission Dilaudid     DVT Prophylaxis: Pneumatic Compression Devices  Code Status: Full Code    # Pain Assessment:  Current Pain Score 10/17/2013   Pain score (0-10) -   Pain location Back   Pain descriptors -   - Fani is experiencing pain due to chronic pain. Pain management was discussed and the plan was created in a collaborative fashion.  Fani's response to the current recommendations: engaged  - Opioid regimen: prn dilaudid  - Response to opioid medications: Reduction of symptoms   - Bowel regimen: senna, miralax and lactulose    Disposition: Expected discharge in 3 days once bleeding resolved.    Chintan Mao    Primary Care Physician   [facility]    Chief Complaint   Vaginal bleeding    History is obtained from the patient    History of Present Illness   Fani Escobedo is a 63 year old female who presents with vaginal bleeding.  Patient reports she has had intermittent vaginal bleeding over the past 2 years.  She has had more persistent bleeding recently and had seen an OB/GYN in [place] ([facility] in [place]).  They attempted a biopsy but were unsuccessful secondary to pain and they did not want to attempt again secondary to thrombocytopenia.  She was referred to hematology for follow-up, but was never contacted to schedule an appointment and therefore did not do so.  She now returned to [place] for the summer.  Over the past 10 days she reports constant bleeding with large clots.  Over the past 7 days she has had lightheadedness and general fatigue.  She denies any shortness of breath, chest pain or pressure.  She reports intermittent lower abdominal cramping over the past 2 years.  She experienced an approximate 10 pound unintentional weight loss over the past 3-4 months.  She denies any fevers or chills or night sweats.  Remainder review of systems is negative.    Past Medical History    COPD  Diabetes mellitus type 2 with neuropathy  Obstructive sleep  "apnea, untreated  Hypothyroidism  Depression  Chronic anemia  Left heel ulcer  Chronic pain on chronic narcotics  History of MRSA T7/T8 discitis and osteomyelitis    Past Surgical History   Right below-knee amputation  Thoracic fusion with vertebrectomy in 2013  Left toe amputation  First rib resection  Heel spur surgery    Prior to Admission Medications   Prior to Admission Medications   Prescriptions Last Dose Informant Patient Reported? Taking?   ASPIRIN ADULT LOW STRENGTH PO Past Week at Unknown time Self Yes Yes   Sig: Take 81 mg by mouth twice a week    B Complex Vitamins (VITAMIN  B COMPLEX) tablet 6/4/2018 at Midday Self Yes Yes   Sig: Take 1 tablet by mouth daily.   GABAPENTIN PO 6/5/2018 at x1 Self Yes Yes   Sig: Take 1,200 mg by mouth 3 times daily 2 x 600 mg tab   HYDROmorphone (DILAUDID) 4 MG tablet More than a month at PRN Self No No   Sig: Take 1 tablet (4 mg) by mouth every 4 hours as needed   Patient taking differently: Take 4 mg by mouth every 4 hours as needed (\"Really bad pain\")    Insulin Aspart (NOVOLOG SC) 6/5/2018 at AM Self Yes Yes   Sig: Inject 28 Units Subcutaneous daily (with breakfast)   Insulin Aspart (NOVOLOG SC) 6/4/2018 at Lunch Self Yes Yes   Sig: Inject 30 Units Subcutaneous daily (with lunch)   Insulin Aspart (NOVOLOG SC) 6/4/2018 at supper Self Yes Yes   Sig: Inject 28 Units Subcutaneous daily (with dinner)   LEVOTHYROXINE SODIUM PO 6/4/2018 at PM Self Yes Yes   Sig: Take 50 mcg by mouth daily    Lactobacillus (ACIDOPHILUS PO) 6/4/2018 at PM Self Yes Yes   Sig: Take 1 capsule by mouth daily   METFORMIN HCL PO 6/5/2018 at AM Self Yes Yes   Sig: Take 1,000 mg by mouth 2 times daily (with meals)   calcium carb 1250 mg, 500 mg [place],/vitamin D 200 units (OSCAL WITH D) 500-200 MG-UNIT per tablet 6/5/2018 at x1 Self No Yes   Sig: Take 1 tablet by mouth 3 times daily (with meals)   ferrous sulfate 325 (65 FE) MG tablet 6/5/2018 at x1 Self Yes Yes   Sig: Take 1 tablet by mouth 3 times " "daily (with meals)    insulin detemir (LEVEMIR FLEXPEN/FLEXTOUCH) 100 UNIT/ML injection 2018 at AM Self Yes Yes   Sig: Inject 42 Units Subcutaneous 2 times daily   potassium chloride (KLOR-CON) 8 MEQ CR tablet 2018 at Afternoon Self Yes Yes   Sig: Take 8 mEq by mouth daily in the afternoon   potassium chloride (KLOR-CON) 8 MEQ CR tablet 2018 at AM Self Yes Yes   Sig: Take 16 mEq by mouth 2 times daily AM and PM in addition to afternoon dose      Facility-Administered Medications: None     Allergies   Allergies   Allergen Reactions     Adhesive Tape      Cephalosporins Anaphylaxis     Naproxen Anaphylaxis     Augmentin      Benadryl [Anti-Itch]      Penicillins      Morphine Hcl Itching and Rash       Social History   I have personally reviewed the social history with the patient showing she has smoked 1 pack per day for the past 40 years.  She denies any alcohol or illicit drug use.  She is largely wheelchair bound.    Family History   Aunt had an unknown \"female cancer.\"  Mother experienced TIAs late in life, father had congestive heart failure late in life, sister had diabetes and  of sepsis.    Review of Systems   The 10 point Review of Systems is negative other than noted in the HPI or here.     Physical Exam   Temp: 98.4  F (36.9  C) Temp src: Oral BP: 119/85   Heart Rate: 88 Resp: 16 SpO2: 95 % O2 Device: None (Room air)    Vital Signs with Ranges  Temp:  [98.4  F (36.9  C)] 98.4  F (36.9  C)  Heart Rate:  [87-95] 88  Resp:  [16] 16  BP: (119-148)/(56-85) 119/85  SpO2:  [92 %-97 %] 95 %  264 lbs 0 oz    Constitutional: well developed, obese female in no acute distress  Eyes: pupils equal, round and reactive to light, no icterus  HEENT: head normocephalic, atraumatic, mucous membranes moist, no cervical lymphadenopathy  Respiratory: lungs clear to auscultation bilaterally, no crackles or wheezes, no tachypnea  Cardiovascular: regular rate and rhythm, normal S1/S2, no murmur, rubs or gallops  GI: " abdomen soft, non-tender, non-distended, normal bowel sounds, no hepatosplenomegaly  Lymph/Hematologic: left lower extremity edema  Skin: No rash or bruising; left heel ulcer not examined as left lower extremity has compression stocking  Musculoskeletal: right BKA  Neurologic: alert and appropriate, cranial nerves grossly intact, gross motor movements intact, strength grossly intact  Psychiatric: normal affect    Data   Data reviewed today:  I personally reviewed no images or EKG's today.    Recent Labs  Lab 06/05/18  1330   WBC 3.3*   HGB 10.7*   MCV 99   PLT 41*      POTASSIUM 3.9   CHLORIDE 107   CO2 25   BUN 10   CR 0.52   ANIONGAP 9   DESIREE 8.4*   *       Recent Results (from the past 24 hour(s))   US Pelvic Complete with Transvaginal    Narrative    ULTRASOUND PELVIC COMPLETE WITH TRANSVAGINAL IMAGING  6/5/2018 2:30 PM      HISTORY:  Postmenopausal bleeding.    TECHNIQUE:  Transvaginal images were performed to better evaluate the  patient's uterus, ovaries and endometrial stripe.    COMPARISON: None.    FINDINGS:  The uterus is measured at 9.1 x 3.7 x 3.2 cm. No fibroids  are evident. Endometrial stripe measures 1.8 cm and appears thickened  and heterogeneous. A few small nabothian cysts are noted within the  cervix. The ovaries are not visualized. No solid adnexal masses are  identified. Small amount of anechoic free pelvic fluid is present. The  exam was somewhat limited related to patient body habitus and  immobility.       Impression    IMPRESSION:   1. The endometrium is heterogeneous and abnormally thickened at 1.8  cm. Endometrial malignancy could have this appearance, and gynecologic  consultation is recommended.  2. Small amount of nonspecific free fluid in the pelvis.  3. Nonvisualization of the ovaries.    ANGELY WALLS MD

## 2018-06-05 NOTE — LETTER
Transition Communication Hand-off for Care Transitions to Next Level of Care Provider    Name: Fani Escobedo  : 1955  MRN #: [id number]  Primary Care Provider: [facility]     Primary Clinic: [id number]  [facility] [address]     Reason for Hospitalization:  Vaginal bleeding [N93.9]  Thrombocytopenia (H) [D69.6]  Abnormal pelvic ultrasound [R93.8]  Admit Date/Time: 2018  1:07 PM  Discharge Date:   Payor Source: [email or web address] / Plan: [employer]ERAL / Product Type: HMO /     Readmission Assessment Measure (RONALD) Risk Score/category: Elevated  Reason for Communication Hand-off Referral: Multiple providers/specialties  Non-adherence to plan of care related to:  Declined Homecare  Avoidable readmission within 30 days    Follow-up plan:  Future Appointments  Date Time Provider Department Center   2018 2:00 PM Nurse,  Oncology [facility]   2018 3:00 PM Sheeba Montana MD [facility]      Hospital Course :  Admitted for vaginal bleeding. Patient sated she has been dealing with for few years. Seen by obgyn and D&C   Prior to procedure she was transfused platelets and given Vitamin K 5mg IV x1 as INR 1.18. Given an additional unit of platelets on 6/6 AM prior to procedure. Tolerated D&C well. No post procedure complications. Bleeding much improved. Endometrial curettings sent to pathology for review, results still pending at the time of discharge. Dr. Ortega of OB/Gyn will follow up with patient. Hgb remained stable at 9-10 this stay.  Seen by hematologist and follow up recommended in 1-2 weeks,Primary care  appointment made.  Patient would benefit from Home RN visit and was offered services but patient declines it stating she is not home bound as she will have to care for her grand children.Patient discharging today  Winston Teixeira RN Care coordinator    AVS/Discharge Summary is the source of truth; this is a helpful guide for improved  communication of patient story

## 2018-06-05 NOTE — ED NOTES
[facility]  ED Nurse Handoff Report    ED Chief complaint: Vaginal Bleeding (has been doctoring with this problem, today the bleeding is worse)      ED Diagnosis:   Final diagnoses:   Vaginal bleeding   Abnormal pelvic ultrasound   Thrombocytopenia (H)       Code Status: Full Code    Allergies:   Allergies   Allergen Reactions     Adhesive Tape      Cephalosporins Anaphylaxis     Naproxen Anaphylaxis     Augmentin      Benadryl [Anti-Itch]      Penicillins      Morphine Hcl Itching and Rash       Activity level - Baseline/Home:  Total Care- has her electric wheelchair    Activity Level - Current:   Total Care     Needed?: No    Isolation: Yes  Infection: Not Applicable  MRSA    Bariatric?: No    Vital Signs:   Vitals:    06/05/18 1430 06/05/18 1530 06/05/18 1600 06/05/18 1630   BP: 120/56 131/65 119/85 145/67   Resp: 16 16     Temp:       TempSrc:       SpO2: 92% 97% 95% 95%   Weight:       Height:           Cardiac Rhythm: ,        Pain level:      Is this patient confused?: No   Suicidality: Screened negative    Patient Report: Initial Complaint: Pt with hx DM/COPD/ASthma/Anemia presents with vag bleeding x 10 days with clots. Pt reports filling pads every couple hours. Also reports dizziness/weakness/lightheadness x 7 days. Pt has had vaginal bleeding intermittently x couple years, but not with such a heavy flow. REports hx of thrombocytopenia and that she recently stopped taking her baby ASA for that reason.   Focused Assessment: AOx3. Skin w/d. Breathing easy, non-labored.Right BKA.  L leg and foot wrapped in ace bandages.   Tests Performed: labs, US  Abnormal Results:   Results for orders placed or performed during the hospital encounter of 06/05/18   US Pelvic Complete with Transvaginal    Narrative    ULTRASOUND PELVIC COMPLETE WITH TRANSVAGINAL IMAGING  6/5/2018 2:30 PM      HISTORY:  Postmenopausal bleeding.    TECHNIQUE:  Transvaginal images were performed to better evaluate  the  patient's uterus, ovaries and endometrial stripe.    COMPARISON: None.    FINDINGS:  The uterus is measured at 9.1 x 3.7 x 3.2 cm. No fibroids  are evident. Endometrial stripe measures 1.8 cm and appears thickened  and heterogeneous. A few small nabothian cysts are noted within the  cervix. The ovaries are not visualized. No solid adnexal masses are  identified. Small amount of anechoic free pelvic fluid is present. The  exam was somewhat limited related to patient body habitus and  immobility.       Impression    IMPRESSION:   1. The endometrium is heterogeneous and abnormally thickened at 1.8  cm. Endometrial malignancy could have this appearance, and gynecologic  consultation is recommended.  2. Small amount of nonspecific free fluid in the pelvis.  3. Nonvisualization of the ovaries.    ANGELY WALLS MD   CBC with platelets differential   Result Value Ref Range    WBC 3.3 (L) 4.0 - 11.0 10e9/L    RBC Count 3.21 (L) 3.8 - 5.2 10e12/L    Hemoglobin 10.7 (L) 11.7 - 15.7 g/dL    Hematocrit 31.8 (L) 35.0 - 47.0 %    MCV 99 78 - 100 fl    MCH 33.3 (H) 26.5 - 33.0 pg    MCHC 33.6 31.5 - 36.5 g/dL    RDW 14.3 10.0 - 15.0 %    Platelet Count 41 (LL) 150 - 450 10e9/L    Diff Method Automated Method     % Neutrophils 50.9 %    % Lymphocytes 36.9 %    % Monocytes 7.9 %    % Eosinophils 3.7 %    % Basophils 0.3 %    % Immature Granulocytes 0.3 %    Nucleated RBCs 0 0 /100    Absolute Neutrophil 1.7 1.6 - 8.3 10e9/L    Absolute Lymphocytes 1.2 0.8 - 5.3 10e9/L    Absolute Monocytes 0.3 0.0 - 1.3 10e9/L    Absolute Eosinophils 0.1 0.0 - 0.7 10e9/L    Absolute Basophils 0.0 0.0 - 0.2 10e9/L    Abs Immature Granulocytes 0.0 0 - 0.4 10e9/L    Absolute Nucleated RBC 0.0     Polychromasia Slight     Platelet Estimate       Automated count confirmed.  Platelet morphology is normal.   Basic metabolic panel   Result Value Ref Range    Sodium 141 133 - 144 mmol/L    Potassium 3.9 3.4 - 5.3 mmol/L    Chloride 107 94 - 109 mmol/L     Carbon Dioxide 25 20 - 32 mmol/L    Anion Gap 9 3 - 14 mmol/L    Glucose 194 (H) 70 - 99 mg/dL    Urea Nitrogen 10 7 - 30 mg/dL    Creatinine 0.52 0.52 - 1.04 mg/dL    GFR Estimate >90 >60 mL/min/1.7m2    GFR Estimate If Black >90 >60 mL/min/1.7m2    Calcium 8.4 (L) 8.5 - 10.1 mg/dL   ABO/Rh type and screen   Result Value Ref Range    ABO O     RH(D) Pos     Antibody Screen Neg     Test Valid Only At [facility]        Specimen Expires 06/08/2018        Treatments provided: 1L 0.9NS bolus    Family Comments:     OBS brochure/video discussed/provided to patient: N/A    ED Medications:   Medications   0.9% sodium chloride BOLUS (0 mLs Intravenous Stopped 6/5/18 7272)       Drips infusing?:  No    For the majority of the shift this patient was Green.   Interventions performed were .    Severe Sepsis OR Septic Shock Diagnosis Present: No      ED NURSE PHONE NUMBER: [phone]

## 2018-06-05 NOTE — CONSULTS
GYN  Pt seen and examined.  Needs D&C.  Full note to follow.  Electronically signed by:  Tip Ortega MD   June 5, 2018 4:55 PM  [facility]  Cell [phone]

## 2018-06-06 ENCOUNTER — TRANSFERRED RECORDS (OUTPATIENT)
Dept: HEALTH INFORMATION MANAGEMENT | Facility: CLINIC | Age: 63
End: 2018-06-06

## 2018-06-06 ENCOUNTER — SURGERY (OUTPATIENT)
Age: 63
End: 2018-06-06

## 2018-06-06 ENCOUNTER — ANESTHESIA (OUTPATIENT)
Dept: SURGERY | Facility: CLINIC | Age: 63
DRG: 744 | End: 2018-06-06
Payer: COMMERCIAL

## 2018-06-06 PROBLEM — R93.89 ABNORMAL FINDING ON ULTRASOUND: Status: ACTIVE | Noted: 2018-06-06

## 2018-06-06 PROBLEM — N95.0 POSTMENOPAUSAL VAGINAL BLEEDING: Status: ACTIVE | Noted: 2018-06-05

## 2018-06-06 LAB
ALBUMIN UR-MCNC: NEGATIVE MG/DL
APPEARANCE UR: CLEAR
BASOPHILS # BLD AUTO: 0 10E9/L (ref 0–0.2)
BASOPHILS NFR BLD AUTO: 0.3 %
BILIRUB UR QL STRIP: NEGATIVE
BLD PROD TYP BPU: NORMAL
BLD PROD TYP BPU: NORMAL
BLD UNIT ID BPU: 0
BLD UNIT ID BPU: 0
BLOOD PRODUCT CODE: NORMAL
BLOOD PRODUCT CODE: NORMAL
BPU ID: NORMAL
BPU ID: NORMAL
COLOR UR AUTO: ABNORMAL
COPATH REPORT: NORMAL
DIFFERENTIAL METHOD BLD: ABNORMAL
EOSINOPHIL # BLD AUTO: 0.1 10E9/L (ref 0–0.7)
EOSINOPHIL NFR BLD AUTO: 4.8 %
ERYTHROCYTE [DISTWIDTH] IN BLOOD BY AUTOMATED COUNT: 14.6 % (ref 10–15)
FERRITIN SERPL-MCNC: 156 NG/ML (ref 8–252)
FOLATE SERPL-MCNC: 19.4 NG/ML
GLUCOSE BLDC GLUCOMTR-MCNC: 143 MG/DL (ref 70–99)
GLUCOSE BLDC GLUCOMTR-MCNC: 165 MG/DL (ref 70–99)
GLUCOSE BLDC GLUCOMTR-MCNC: 165 MG/DL (ref 70–99)
GLUCOSE BLDC GLUCOMTR-MCNC: 191 MG/DL (ref 70–99)
GLUCOSE BLDC GLUCOMTR-MCNC: 210 MG/DL (ref 70–99)
GLUCOSE BLDC GLUCOMTR-MCNC: 369 MG/DL (ref 70–99)
GLUCOSE UR STRIP-MCNC: NEGATIVE MG/DL
HCT VFR BLD AUTO: 28.6 % (ref 35–47)
HGB BLD-MCNC: 9.7 G/DL (ref 11.7–15.7)
HGB UR QL STRIP: ABNORMAL
IMM GRANULOCYTES # BLD: 0 10E9/L (ref 0–0.4)
IMM GRANULOCYTES NFR BLD: 0.7 %
INR PPP: 1.17 (ref 0.86–1.14)
INTERPRETATION ECG - MUSE: NORMAL
IRON SATN MFR SERPL: 28 % (ref 15–46)
IRON SERPL-MCNC: 66 UG/DL (ref 35–180)
KETONES UR STRIP-MCNC: NEGATIVE MG/DL
LEUKOCYTE ESTERASE UR QL STRIP: NEGATIVE
LYMPHOCYTES # BLD AUTO: 1 10E9/L (ref 0.8–5.3)
LYMPHOCYTES NFR BLD AUTO: 35.2 %
MCH RBC QN AUTO: 33.7 PG (ref 26.5–33)
MCHC RBC AUTO-ENTMCNC: 33.9 G/DL (ref 31.5–36.5)
MCV RBC AUTO: 99 FL (ref 78–100)
MONOCYTES # BLD AUTO: 0.3 10E9/L (ref 0–1.3)
MONOCYTES NFR BLD AUTO: 9 %
MUCOUS THREADS #/AREA URNS LPF: PRESENT /LPF
NEUTROPHILS # BLD AUTO: 1.5 10E9/L (ref 1.6–8.3)
NEUTROPHILS NFR BLD AUTO: 50 %
NITRATE UR QL: NEGATIVE
NRBC # BLD AUTO: 0 10*3/UL
NRBC BLD AUTO-RTO: 0 /100
PH UR STRIP: 5 PH (ref 5–7)
PLATELET # BLD AUTO: 43 10E9/L (ref 150–450)
POTASSIUM SERPL-SCNC: 3.9 MMOL/L (ref 3.4–5.3)
RBC # BLD AUTO: 2.88 10E12/L (ref 3.8–5.2)
RBC #/AREA URNS AUTO: 3 /HPF (ref 0–2)
RETICS # AUTO: 133.7 10E9/L (ref 25–95)
RETICS # AUTO: NORMAL 10E9/L (ref 25–95)
RETICS/RBC NFR AUTO: 4.6 % (ref 0.5–2)
RETICS/RBC NFR AUTO: NORMAL % (ref 0.5–2)
SOURCE: ABNORMAL
SP GR UR STRIP: 1.01 (ref 1–1.03)
SQUAMOUS #/AREA URNS AUTO: <1 /HPF (ref 0–1)
TIBC SERPL-MCNC: 235 UG/DL (ref 240–430)
TRANSFERRIN SERPL-MCNC: 185 MG/DL (ref 210–360)
TRANSFUSION STATUS PATIENT QL: NORMAL
UROBILINOGEN UR STRIP-MCNC: NORMAL MG/DL (ref 0–2)
VIT B12 SERPL-MCNC: 606 PG/ML (ref 193–986)
WBC # BLD AUTO: 2.9 10E9/L (ref 4–11)
WBC #/AREA URNS AUTO: <1 /HPF (ref 0–5)

## 2018-06-06 PROCEDURE — 25000125 ZZHC RX 250: Performed by: NURSE ANESTHETIST, CERTIFIED REGISTERED

## 2018-06-06 PROCEDURE — 25000566 ZZH SEVOFLURANE, EA 15 MIN: Performed by: SPECIALIST

## 2018-06-06 PROCEDURE — 83540 ASSAY OF IRON: CPT | Performed by: HOSPITALIST

## 2018-06-06 PROCEDURE — 25000132 ZZH RX MED GY IP 250 OP 250 PS 637: Performed by: SPECIALIST

## 2018-06-06 PROCEDURE — 00000146 ZZHCL STATISTIC GLUCOSE BY METER IP

## 2018-06-06 PROCEDURE — 84466 ASSAY OF TRANSFERRIN: CPT | Performed by: HOSPITALIST

## 2018-06-06 PROCEDURE — P9037 PLATE PHERES LEUKOREDU IRRAD: HCPCS | Performed by: SPECIALIST

## 2018-06-06 PROCEDURE — 25000132 ZZH RX MED GY IP 250 OP 250 PS 637: Performed by: INTERNAL MEDICINE

## 2018-06-06 PROCEDURE — 37000008 ZZH ANESTHESIA TECHNICAL FEE, 1ST 30 MIN: Performed by: SPECIALIST

## 2018-06-06 PROCEDURE — 71000013 ZZH RECOVERY PHASE 1 LEVEL 1 EA ADDTL HR: Performed by: SPECIALIST

## 2018-06-06 PROCEDURE — 37000009 ZZH ANESTHESIA TECHNICAL FEE, EACH ADDTL 15 MIN: Performed by: SPECIALIST

## 2018-06-06 PROCEDURE — 99222 1ST HOSP IP/OBS MODERATE 55: CPT | Performed by: INTERNAL MEDICINE

## 2018-06-06 PROCEDURE — 85610 PROTHROMBIN TIME: CPT | Performed by: HOSPITALIST

## 2018-06-06 PROCEDURE — 86850 RBC ANTIBODY SCREEN: CPT | Performed by: SPECIALIST

## 2018-06-06 PROCEDURE — 88305 TISSUE EXAM BY PATHOLOGIST: CPT | Mod: 26 | Performed by: SPECIALIST

## 2018-06-06 PROCEDURE — 82607 VITAMIN B-12: CPT | Performed by: HOSPITALIST

## 2018-06-06 PROCEDURE — 25000125 ZZHC RX 250: Performed by: SPECIALIST

## 2018-06-06 PROCEDURE — 25000131 ZZH RX MED GY IP 250 OP 636 PS 637: Performed by: INTERNAL MEDICINE

## 2018-06-06 PROCEDURE — 99233 SBSQ HOSP IP/OBS HIGH 50: CPT | Performed by: INTERNAL MEDICINE

## 2018-06-06 PROCEDURE — 82728 ASSAY OF FERRITIN: CPT | Performed by: HOSPITALIST

## 2018-06-06 PROCEDURE — 36000050 ZZH SURGERY LEVEL 2 1ST 30 MIN: Performed by: SPECIALIST

## 2018-06-06 PROCEDURE — 85045 AUTOMATED RETICULOCYTE COUNT: CPT | Performed by: HOSPITALIST

## 2018-06-06 PROCEDURE — 25000128 H RX IP 250 OP 636: Performed by: ANESTHESIOLOGY

## 2018-06-06 PROCEDURE — 86901 BLOOD TYPING SEROLOGIC RH(D): CPT | Performed by: SPECIALIST

## 2018-06-06 PROCEDURE — 25000128 H RX IP 250 OP 636: Performed by: SPECIALIST

## 2018-06-06 PROCEDURE — 36415 COLL VENOUS BLD VENIPUNCTURE: CPT | Performed by: HOSPITALIST

## 2018-06-06 PROCEDURE — 85025 COMPLETE CBC W/AUTO DIFF WBC: CPT | Performed by: INTERNAL MEDICINE

## 2018-06-06 PROCEDURE — 71000012 ZZH RECOVERY PHASE 1 LEVEL 1 FIRST HR: Performed by: SPECIALIST

## 2018-06-06 PROCEDURE — 81001 URINALYSIS AUTO W/SCOPE: CPT | Performed by: SPECIALIST

## 2018-06-06 PROCEDURE — 88305 TISSUE EXAM BY PATHOLOGIST: CPT | Performed by: SPECIALIST

## 2018-06-06 PROCEDURE — 86900 BLOOD TYPING SEROLOGIC ABO: CPT | Performed by: SPECIALIST

## 2018-06-06 PROCEDURE — 40000170 ZZH STATISTIC PRE-PROCEDURE ASSESSMENT II: Performed by: SPECIALIST

## 2018-06-06 PROCEDURE — 83550 IRON BINDING TEST: CPT | Performed by: HOSPITALIST

## 2018-06-06 PROCEDURE — 84132 ASSAY OF SERUM POTASSIUM: CPT | Performed by: HOSPITALIST

## 2018-06-06 PROCEDURE — 27210794 ZZH OR GENERAL SUPPLY STERILE: Performed by: SPECIALIST

## 2018-06-06 PROCEDURE — 25000125 ZZHC RX 250: Performed by: ANESTHESIOLOGY

## 2018-06-06 PROCEDURE — 36000052 ZZH SURGERY LEVEL 2 EA 15 ADDTL MIN: Performed by: SPECIALIST

## 2018-06-06 PROCEDURE — 82746 ASSAY OF FOLIC ACID SERUM: CPT | Performed by: HOSPITALIST

## 2018-06-06 PROCEDURE — 85045 AUTOMATED RETICULOCYTE COUNT: CPT | Performed by: INTERNAL MEDICINE

## 2018-06-06 PROCEDURE — 12000000 ZZH R&B MED SURG/OB

## 2018-06-06 PROCEDURE — 25000132 ZZH RX MED GY IP 250 OP 250 PS 637: Performed by: NURSE ANESTHETIST, CERTIFIED REGISTERED

## 2018-06-06 PROCEDURE — 25000128 H RX IP 250 OP 636: Performed by: HOSPITALIST

## 2018-06-06 PROCEDURE — 25000128 H RX IP 250 OP 636: Performed by: NURSE ANESTHETIST, CERTIFIED REGISTERED

## 2018-06-06 PROCEDURE — 25000132 ZZH RX MED GY IP 250 OP 250 PS 637: Performed by: HOSPITALIST

## 2018-06-06 RX ORDER — GLYCOPYRROLATE 0.2 MG/ML
INJECTION, SOLUTION INTRAMUSCULAR; INTRAVENOUS PRN
Status: DISCONTINUED | OUTPATIENT
Start: 2018-06-06 | End: 2018-06-06

## 2018-06-06 RX ORDER — PROPOFOL 10 MG/ML
INJECTION, EMULSION INTRAVENOUS PRN
Status: DISCONTINUED | OUTPATIENT
Start: 2018-06-06 | End: 2018-06-06

## 2018-06-06 RX ORDER — ONDANSETRON 4 MG/1
4 TABLET, ORALLY DISINTEGRATING ORAL EVERY 30 MIN PRN
Status: DISCONTINUED | OUTPATIENT
Start: 2018-06-06 | End: 2018-06-06 | Stop reason: HOSPADM

## 2018-06-06 RX ORDER — HYDROMORPHONE HYDROCHLORIDE 1 MG/ML
.3-.5 INJECTION, SOLUTION INTRAMUSCULAR; INTRAVENOUS; SUBCUTANEOUS EVERY 5 MIN PRN
Status: DISCONTINUED | OUTPATIENT
Start: 2018-06-06 | End: 2018-06-06 | Stop reason: HOSPADM

## 2018-06-06 RX ORDER — CLINDAMYCIN PHOSPHATE 900 MG/50ML
900 INJECTION, SOLUTION INTRAVENOUS SEE ADMIN INSTRUCTIONS
Status: DISCONTINUED | OUTPATIENT
Start: 2018-06-06 | End: 2018-06-06 | Stop reason: HOSPADM

## 2018-06-06 RX ORDER — NEOSTIGMINE METHYLSULFATE 1 MG/ML
VIAL (ML) INJECTION PRN
Status: DISCONTINUED | OUTPATIENT
Start: 2018-06-06 | End: 2018-06-06

## 2018-06-06 RX ORDER — PHENAZOPYRIDINE HYDROCHLORIDE 200 MG/1
200 TABLET, FILM COATED ORAL ONCE
Status: COMPLETED | OUTPATIENT
Start: 2018-06-06 | End: 2018-06-06

## 2018-06-06 RX ORDER — ONDANSETRON 2 MG/ML
4 INJECTION INTRAMUSCULAR; INTRAVENOUS EVERY 30 MIN PRN
Status: DISCONTINUED | OUTPATIENT
Start: 2018-06-06 | End: 2018-06-06 | Stop reason: HOSPADM

## 2018-06-06 RX ORDER — LIDOCAINE HYDROCHLORIDE 20 MG/ML
INJECTION, SOLUTION INFILTRATION; PERINEURAL PRN
Status: DISCONTINUED | OUTPATIENT
Start: 2018-06-06 | End: 2018-06-06

## 2018-06-06 RX ORDER — MICONAZOLE NITRATE 20 MG/G
CREAM TOPICAL 2 TIMES DAILY
Status: DISCONTINUED | OUTPATIENT
Start: 2018-06-06 | End: 2018-06-08 | Stop reason: HOSPADM

## 2018-06-06 RX ORDER — FENTANYL CITRATE 50 UG/ML
25-50 INJECTION, SOLUTION INTRAMUSCULAR; INTRAVENOUS
Status: DISCONTINUED | OUTPATIENT
Start: 2018-06-06 | End: 2018-06-06 | Stop reason: HOSPADM

## 2018-06-06 RX ORDER — ALBUTEROL SULFATE 90 UG/1
AEROSOL, METERED RESPIRATORY (INHALATION) PRN
Status: DISCONTINUED | OUTPATIENT
Start: 2018-06-06 | End: 2018-06-06

## 2018-06-06 RX ORDER — SODIUM CHLORIDE 9 MG/ML
INJECTION, SOLUTION INTRAVENOUS CONTINUOUS PRN
Status: DISCONTINUED | OUTPATIENT
Start: 2018-06-06 | End: 2018-06-06

## 2018-06-06 RX ORDER — CLINDAMYCIN PHOSPHATE 900 MG/50ML
900 INJECTION, SOLUTION INTRAVENOUS
Status: DISCONTINUED | OUTPATIENT
Start: 2018-06-06 | End: 2018-06-06 | Stop reason: HOSPADM

## 2018-06-06 RX ORDER — NALOXONE HYDROCHLORIDE 0.4 MG/ML
.1-.4 INJECTION, SOLUTION INTRAMUSCULAR; INTRAVENOUS; SUBCUTANEOUS
Status: DISCONTINUED | OUTPATIENT
Start: 2018-06-06 | End: 2018-06-06

## 2018-06-06 RX ORDER — SULFAMETHOXAZOLE/TRIMETHOPRIM 800-160 MG
1 TABLET ORAL 2 TIMES DAILY
Status: DISCONTINUED | OUTPATIENT
Start: 2018-06-06 | End: 2018-06-08 | Stop reason: HOSPADM

## 2018-06-06 RX ORDER — SODIUM CHLORIDE, SODIUM LACTATE, POTASSIUM CHLORIDE, CALCIUM CHLORIDE 600; 310; 30; 20 MG/100ML; MG/100ML; MG/100ML; MG/100ML
INJECTION, SOLUTION INTRAVENOUS CONTINUOUS
Status: DISCONTINUED | OUTPATIENT
Start: 2018-06-06 | End: 2018-06-06 | Stop reason: HOSPADM

## 2018-06-06 RX ORDER — SULFAMETHOXAZOLE/TRIMETHOPRIM 800-160 MG
1 TABLET ORAL 2 TIMES DAILY
COMMUNITY

## 2018-06-06 RX ORDER — IPRATROPIUM BROMIDE AND ALBUTEROL SULFATE 2.5; .5 MG/3ML; MG/3ML
3 SOLUTION RESPIRATORY (INHALATION) ONCE
Status: COMPLETED | OUTPATIENT
Start: 2018-06-06 | End: 2018-06-06

## 2018-06-06 RX ORDER — FENTANYL CITRATE 50 UG/ML
INJECTION, SOLUTION INTRAMUSCULAR; INTRAVENOUS PRN
Status: DISCONTINUED | OUTPATIENT
Start: 2018-06-06 | End: 2018-06-06

## 2018-06-06 RX ORDER — DEXAMETHASONE SODIUM PHOSPHATE 4 MG/ML
INJECTION, SOLUTION INTRA-ARTICULAR; INTRALESIONAL; INTRAMUSCULAR; INTRAVENOUS; SOFT TISSUE PRN
Status: DISCONTINUED | OUTPATIENT
Start: 2018-06-06 | End: 2018-06-06

## 2018-06-06 RX ADMIN — GABAPENTIN 1200 MG: 600 TABLET, FILM COATED ORAL at 22:43

## 2018-06-06 RX ADMIN — SULFAMETHOXAZOLE AND TRIMETHOPRIM 1 TABLET: 800; 160 TABLET ORAL at 22:43

## 2018-06-06 RX ADMIN — DEXAMETHASONE SODIUM PHOSPHATE 8 MG: 4 INJECTION, SOLUTION INTRA-ARTICULAR; INTRALESIONAL; INTRAMUSCULAR; INTRAVENOUS; SOFT TISSUE at 09:55

## 2018-06-06 RX ADMIN — CLINDAMYCIN PHOSPHATE 900 MG: 18 INJECTION, SOLUTION INTRAVENOUS at 09:54

## 2018-06-06 RX ADMIN — ALBUTEROL SULFATE 3 PUFF: 90 AEROSOL, METERED RESPIRATORY (INHALATION) at 10:23

## 2018-06-06 RX ADMIN — MICONAZOLE NITRATE: 20 CREAM TOPICAL at 22:42

## 2018-06-06 RX ADMIN — GABAPENTIN 1200 MG: 600 TABLET, FILM COATED ORAL at 16:31

## 2018-06-06 RX ADMIN — INSULIN DETEMIR 42 UNITS: 100 INJECTION, SOLUTION SUBCUTANEOUS at 22:42

## 2018-06-06 RX ADMIN — GENTAMICIN SULFATE 180 MG: 40 INJECTION, SOLUTION INTRAMUSCULAR; INTRAVENOUS at 09:54

## 2018-06-06 RX ADMIN — GLYCOPYRROLATE 0.8 MG: 0.2 INJECTION, SOLUTION INTRAMUSCULAR; INTRAVENOUS at 10:21

## 2018-06-06 RX ADMIN — FENTANYL CITRATE 50 MCG: 50 INJECTION, SOLUTION INTRAMUSCULAR; INTRAVENOUS at 09:39

## 2018-06-06 RX ADMIN — PHENYLEPHRINE HYDROCHLORIDE 100 MCG: 10 INJECTION, SOLUTION INTRAMUSCULAR; INTRAVENOUS; SUBCUTANEOUS at 10:09

## 2018-06-06 RX ADMIN — ROCURONIUM BROMIDE 40 MG: 10 INJECTION INTRAVENOUS at 09:44

## 2018-06-06 RX ADMIN — LEVOTHYROXINE SODIUM 50 MCG: 50 TABLET ORAL at 22:43

## 2018-06-06 RX ADMIN — POTASSIUM CHLORIDE 20 MEQ: 1500 TABLET, EXTENDED RELEASE ORAL at 22:43

## 2018-06-06 RX ADMIN — SODIUM CHLORIDE: 9 INJECTION, SOLUTION INTRAVENOUS at 09:53

## 2018-06-06 RX ADMIN — FENTANYL CITRATE 50 MCG: 50 INJECTION, SOLUTION INTRAMUSCULAR; INTRAVENOUS at 09:43

## 2018-06-06 RX ADMIN — SODIUM CHLORIDE, POTASSIUM CHLORIDE, SODIUM LACTATE AND CALCIUM CHLORIDE: 600; 310; 30; 20 INJECTION, SOLUTION INTRAVENOUS at 09:15

## 2018-06-06 RX ADMIN — PHENYLEPHRINE HYDROCHLORIDE 100 MCG: 10 INJECTION, SOLUTION INTRAMUSCULAR; INTRAVENOUS; SUBCUTANEOUS at 09:58

## 2018-06-06 RX ADMIN — INSULIN ASPART 2 UNITS: 100 INJECTION, SOLUTION INTRAVENOUS; SUBCUTANEOUS at 13:48

## 2018-06-06 RX ADMIN — NEOSTIGMINE METHYLSULFATE 5 MG: 1 INJECTION, SOLUTION INTRAVENOUS at 10:21

## 2018-06-06 RX ADMIN — SODIUM CHLORIDE, PRESERVATIVE FREE: 5 INJECTION INTRAVENOUS at 00:06

## 2018-06-06 RX ADMIN — IPRATROPIUM BROMIDE AND ALBUTEROL SULFATE 3 ML: .5; 3 SOLUTION RESPIRATORY (INHALATION) at 10:50

## 2018-06-06 RX ADMIN — LIDOCAINE HYDROCHLORIDE 60 MG: 20 INJECTION, SOLUTION INFILTRATION; PERINEURAL at 09:43

## 2018-06-06 RX ADMIN — PHENAZOPYRIDINE HYDROCHLORIDE 200 MG: 200 TABLET ORAL at 08:43

## 2018-06-06 RX ADMIN — PHENYLEPHRINE HYDROCHLORIDE 100 MCG: 10 INJECTION, SOLUTION INTRAMUSCULAR; INTRAVENOUS; SUBCUTANEOUS at 09:52

## 2018-06-06 RX ADMIN — PROPOFOL 120 MG: 10 INJECTION, EMULSION INTRAVENOUS at 09:43

## 2018-06-06 RX ADMIN — INSULIN ASPART 5 UNITS: 100 INJECTION, SOLUTION INTRAVENOUS; SUBCUTANEOUS at 16:33

## 2018-06-06 NOTE — PROCEDURES
OPERATIVE NOTE  IMMEDIATE AND FULL    Preoperative Diagnosis: Postmenopausal Vaginal Bleeding  Abnormal Finding on Ultrasound  Morbid Obesity  Diabetes Mellitus Type 2    Postoperative Diagnosis: Same and Pressure Ulcer Left Buttock    Procedure: Dilation and Curettage of Uterus    Surgeon:  Tip Ortega MD    Anesthesia:  General    Findings: Noted to have peeling red skin under large pannus.  Also noted 2 cm L buttock ulcer while moving patient. Vagina with blood in vault.  Cervix dilated about 1 cm with clotted blood in os.  Sounded 9 cm.  Cavity filled with large amount of blood clot and smaller amount of firm gritty tissue.       EBL 10 cc    Specimen: Endometrial Curettings and urine specimen    The patient was brought to the Operating room where general anesthesia was given.  She was prepped and draped in the usual fashion and placed in the dorsal lithotomy position. Prior to starting the procedure a timeout was called.  The CRNA identified the patient and position and the surgeon identified the procedure.  The nurse identified special equipment needed for the procedure.  All were in agreement.    The bladder was catheterized for 240 cc of dark yellow urine which was sent for UA and UC. The anterior lip of the cervix was grasped with a single tooth tenaculum.  The os was sounded 9 cm.  A number 5 Hegar dilator was eaasily admitted and  used to dilate the cervix up to a number 8 which was also easily admitted.  The medium sharp curet was inserted into the cavity and the contents curetted many times until the uterine cri was heard. Large amounts of clot were removed filling 3 Telfa pads. Much smaller amounts of gritty tissue were removed and all was sent to pathology. The instruments were removed and pressure was applied to the tenaculum site. Techni Care was used to clean her skin folds and then dried. The patient was then awakened and taken to the recovery room in good condition.  The endometrial curettings  were sent to pathology.  Debrief was performed.    Electronically signed by:  Tip Ortega MD   June 6, 2018 10:35 AM  [facility]

## 2018-06-06 NOTE — H&P
[facility] History and Physical    Fani Escobedo MRN# [id number]   Age: 63 year old YOB: 1955     Date of Admission:  2018            Assessment and Plan:   Assessment:   Vaginal Bleeding with thickened endometrium in postmenopausal woman.      Plan:   Dilation and curettage of uterus to evaluate endometrium.  Spoke with anesthesia regarding pt's many problems.  Will repeat Hgb and plt in am.  Aim for plt >50,000.             Chief Complaint:   Vaginal Bleeding for 2 yrs.     History is obtained from the patient               Gynecologic History:    Fani is a 62 yo female with a history of vaginal bleeding for about 2 years. Patient reports she has had intermittent vaginal bleeding over the past 2 years.  She has had more persistent bleeding recently and had seen an OB/GYN in [place] ([facility] [facility] in [place]).  They attempted a biopsy but were unsuccessful secondary to severe pain and they did not want to attempt again secondary to thrombocytopenia.  She was referred to hematology for follow-up, but was never contacted to schedule an appointment and therefore did not do so.      She now returned to [place] for the summer.  Over the past 10 days she reports constant bleeding with large clots.  Over the past 7 days she has had lightheadedness and general fatigue.  She denies any shortness of breath, chest pain or pressure.  She reports intermittent lower abdominal cramping over the past 2 years.  She experienced an approximate 10 pound unintentional weight loss over the past 3-4 months.  She denies any fevers or chills or night sweats.  Remainder review of systems is negative.    Ultrasound today shows a small uterus with thickened endometrium of 18 mm in postmenopausal female. Cannot visualize ovaries.             Obstetrical History:     Had twins with  section for breech presentation of 1st Twin.          Past Medical History:     Past Medical History:   Diagnosis Date      Anemia     iron deficiency     Asthma      Charcot foot due to diabetes mellitus (H)     (R) foot -> BKA     COPD (chronic obstructive pulmonary disease) (H)      Delirium      Depression      Diabetes mellitus (H)     type II     Diskitis 5/2013     Hypothyroid      Obesity     bmi 46     USHA (obstructive sleep apnea)     does not use CPAP     Osteomyelitis (H)      Peripheral neuropathy      Sepsis (H)     MRSA bacteremia     Tobacco abuse              Past Surgical History:     Past Surgical History:   Procedure Laterality Date     AMPUTATE LEG BELOW KNEE  4/14/2012    Procedure:AMPUTATE LEG BELOW KNEE; right leg below knee amputation; Surgeon:WILMAR LUI; Location:[facility] OR     AMPUTATE TOE(S)  5/1/2013    Procedure: AMPUTATE TOE(S);  PARTIAL LEFT 3RD TOE AMPUTATION;  Surgeon: Juancarlos Gamez DPM;  Location:  OR     AMPUTATION      (L) 2nd toe     ANESTHESIA OUT OF OR MRI  6/11/2013    Procedure: ANESTHESIA OUT OF OR MRI;  MRI UNDER GENERAL ANESTHESIA;  Surgeon: Provider, Generic Anesthesia;  Location:  OR     ANESTHESIA OUT OF OR MRI  9/4/2013    Procedure: ANESTHESIA OUT OF OR MRI;  ANESTHESIA OUT OF OR MRI;  Surgeon: Provider, Generic Anesthesia;  Location:  OR     EXPLORE SPINE, REMOVE HARDWARE, COMBINED  10/11/2013    Procedure: COMBINED EXPLORE SPINE, REMOVE HARDWARE;  EXPLORATION AND REVISION POSTERIOR THORACIC WOUND WITH WOUND VAC PLACEMENT.;  Surgeon: Thomas Ellis MD;  Location:  OR     FUSION SPINE ANTERIOR THORACIC ONE LEVEL  9/5/2013    Procedure: FUSION SPINE ANTERIOR THORACIC ONE LEVEL;  Right Thoracotomy For  T7-T8 Thorasic Vertebral Body Resection with Strut Graft, [place] Instrumentation T6-T9, BMP, Pyramesh and Intra-operative Neuro Monitoring ;  Surgeon: Thomas Ellis MD;  Location: [facility] OR     INCISION AND DRAINAGE RECTUM, COMBINED  6/11/2012    Procedure: COMBINED INCISION AND DRAINAGE RECTUM;  I&D ABSCESS RIGHT BUTTOCK (MRSA );  Surgeon: Christos Linton  MD Seun;  Location: [facility] OR     INCISION AND DRAINAGE SPINE, CLOSE WOUND, COMBINED  10/14/2013    Procedure: COMBINED INCISION AND DRAINAGE SPINE, CLOSE WOUND;  EXPLORATION/REVISION POSTERIOR THORACIC WOUND COMPLEX 15 CM. ;  Surgeon: Thomas Ellis MD;  Location: [facility] OR     IRRIGATION AND DEBRIDEMENT TRUNK, COMBINED  6/10/2012    Procedure: COMBINED IRRIGATION AND DEBRIDEMENT TRUNK;  Incision and drainage abscess right buttock;  Surgeon: Christos Linton MD;  Location:  OR     OPTICAL TRACKING SYSTEM FUSION POSTERIOR SPINE THORACIC THREE+ LEVELS  9/6/2013    Procedure: OPTICAL TRACKING SYSTEM FUSION POSTERIOR SPINE THORACIC THREE+ LEVELS;  T4-T11 POSTERIOR LATERAL PEDICLE SCREW INSTRUMENTATION WITH IMAGE GUIDANCE AND NEURO-MONITORING;  Surgeon: Thomas Ellis MD;  Location: [facility] OR             Social History:     Social History   Substance Use Topics     Smoking status: Current Some Day Smoker     Years: 45.00     Smokeless tobacco: Never Used     Alcohol use Yes      Comment: occ             Family History:   No family history on file.           Immunizations:             Allergies:   This patient is allergic to is allergic to adhesive tape; cephalosporins; naproxen; augmentin; benadryl [anti-itch]; penicillins; and morphine hcl.          Medications:     Prescriptions Prior to Admission   Medication Sig Dispense Refill Last Dose     ASPIRIN ADULT LOW STRENGTH PO Take 81 mg by mouth twice a week    Past Week at Unknown time     B Complex Vitamins (VITAMIN  B COMPLEX) tablet Take 1 tablet by mouth daily.   6/4/2018 at Midday     calcium carb 1250 mg, 500 mg [place],/vitamin D 200 units (OSCAL WITH D) 500-200 MG-UNIT per tablet Take 1 tablet by mouth 3 times daily (with meals) 90 tablet  6/5/2018 at x1     ferrous sulfate 325 (65 FE) MG tablet Take 1 tablet by mouth 3 times daily (with meals)    6/5/2018 at x1     GABAPENTIN PO Take 1,200 mg by mouth 3 times daily 2 x 600 mg tab   6/5/2018 at  "x1     Insulin Aspart (NOVOLOG SC) Inject 28 Units Subcutaneous daily (with breakfast)   6/5/2018 at AM     Insulin Aspart (NOVOLOG SC) Inject 30 Units Subcutaneous daily (with lunch)   6/4/2018 at Lunch     Insulin Aspart (NOVOLOG SC) Inject 28 Units Subcutaneous daily (with dinner)   6/4/2018 at supper     insulin detemir (LEVEMIR FLEXPEN/FLEXTOUCH) 100 UNIT/ML injection Inject 42 Units Subcutaneous 2 times daily   6/5/2018 at AM     Lactobacillus (ACIDOPHILUS PO) Take 1 capsule by mouth daily   6/4/2018 at PM     LEVOTHYROXINE SODIUM PO Take 50 mcg by mouth daily    6/4/2018 at PM     METFORMIN HCL PO Take 1,000 mg by mouth 2 times daily (with meals)   6/5/2018 at AM     potassium chloride (KLOR-CON) 8 MEQ CR tablet Take 8 mEq by mouth daily in the afternoon   6/4/2018 at Afternoon     potassium chloride (KLOR-CON) 8 MEQ CR tablet Take 16 mEq by mouth 2 times daily AM and PM in addition to afternoon dose   6/5/2018 at AM     HYDROmorphone (DILAUDID) 4 MG tablet Take 1 tablet (4 mg) by mouth every 4 hours as needed (Patient taking differently: Take 4 mg by mouth every 4 hours as needed (\"Really bad pain\") ) 90 tablet 0 More than a month at PRN             Review of Systems:   Ten point ROS negative except as noted in HPI and multiple back and joint pain.  +chronic  Cough.          Physical Exam:   Vitals were reviewed  Chest / Breast:   Breasts symmetrical, skin without lesion(s), no nipple retraction or dimpling, no nipple discharge, no masses palpated, no axillary or supraclavicular adenopathy     Abdomen:   No scars, normal bowel sounds, soft, non-distended, non-tender, no masses palpated, no hepatosplenomegally     Genitounirinary:   External Genitalia:  Sparse hair in area  Bladder:  Fullness absent  Vagina:  Abnormal findings: Atrophic  Cervix:    Uterus:       Bilateral breasts pendulous.Abominal pannus hangs over mons into pelvic area  Pelvic done with difficulty reaching well elevated cervix and uterus.  "           Data:   All laboratory and imaging data in the past 24 hours reviewed    All imaging studies reviewed by me.     Attestation:  I have reviewed today's vital signs, notes, medications, labs and imaging.  Amount of time performed on this discharge summary: 15 minutes.    Electronically signed by:  Tip Ortega MD   June 6, 2018 12:19 AM  [facility]

## 2018-06-06 NOTE — PLAN OF CARE
Problem: Patient Care Overview  Goal: Plan of Care/Patient Progress Review  7-11p A&Ox4.  Up w/ asst 1, pivot transfer to w/c and commode.  VSS, on RA.  C/o chronic back and coccyx pain, declines interventions.  Coccyx red, blanchable.  R BKA.  L heel wound, L leg lymph wrap, pt declined assessment to this area.  Abd folds excoriated, powder applied.  LS diminished.  Mod carb diet, plan to be NPO at midnight for biopsy tomorrow.  Continues having vaginal bleeding, some clots noted.  OBGYN and hem/onc following.  Platelets 41, platelet transfusion completed.  INR 1.18, vitamin K given.  D/C pending, nursing will continue to monitor.

## 2018-06-06 NOTE — ANESTHESIA POSTPROCEDURE EVALUATION
Patient: Fani Escobedo    Procedure(s):  DILATION AND CURETTAGE  - Wound Class: II-Clean Contaminated    Diagnosis:MRSA CONTACT PRECAUTIONS.   VAGINAL BLEEDING, ANEMIA, THROMBOCYTOPENIA  Diagnosis Additional Information: No value filed.    Anesthesia Type:  General, ETT    Note:  Anesthesia Post Evaluation    Patient location during evaluation: PACU  Patient participation: Able to fully participate in evaluation  Level of consciousness: awake  Pain management: adequate  Airway patency: patent  Cardiovascular status: acceptable  Respiratory status: acceptable  Hydration status: acceptable  PONV: none     Anesthetic complications: None          Last vitals:  Vitals:    06/06/18 1140 06/06/18 1150 06/06/18 1200   BP: 108/67 111/58 110/57   Pulse:      Resp: 13 15 17   Temp:      SpO2: 98% 97% 95%         Electronically Signed By: Arley Cortez MD  June 6, 2018  12:11 PM

## 2018-06-06 NOTE — OR NURSING
Skin tear under right pannis. Rash under bilateral pannis. Excoriated at right buttock. Unable to assess LLE due to ace wrap. Dr. Ortega cancelled pneumatic compression device.

## 2018-06-06 NOTE — ANESTHESIA CARE TRANSFER NOTE
Patient: Fani Escobedo    Procedure(s):  DILATION AND CURETTAGE  - Wound Class: II-Clean Contaminated    Diagnosis: MRSA CONTACT PRECAUTIONS.   VAGINAL BLEEDING, ANEMIA, THROMBOCYTOPENIA  Diagnosis Additional Information: No value filed.    Anesthesia Type:   General, ETT     Note:  Airway :Face Mask  Patient transferred to:PACU  Handoff Report: Identifed the Patient, Identified the Reponsible Provider, Reviewed the pertinent medical history, Discussed the surgical course, Reviewed Intra-OP anesthesia mangement and issues during anesthesia, Set expectations for post-procedure period and Allowed opportunity for questions and acknowledgement of understanding    Neuromuscular blockade reversed after TOF 4/4, spontaneous respirations, adequate tidal volumes, followed commands to voice, extubated atraumatically, extubated with suction, airway patent after extubation.  Oxygen via facemask at 10 liters per minute to PACU. Oxygen tubing connected to wall O2 in PACU, SpO2, NiBP, and EKG monitors and alarms on and functioning, report on patient's clinical status given to PACU RN, RN questions answered.     Electronically Signed By: SHAUNA Mercedes CRNA  June 6, 2018  10:42 AM

## 2018-06-06 NOTE — PROGRESS NOTES
[facility]    Hospitalist Progress Note    Assessment & Plan   Fani Escobedo is a 63 year old female with PMHx of DM II, COPD (not O2 dependent), USHA, hypothyroidism, anemia and chronic pain for which she is managed with chronic narcotics who was admitted on 6/5/2018 for evaluation of vaginal bleeding and pancytopenia.     Vaginal bleeding, s/p D&C on 6/6/18:   Endorsed hx of intermittent vaginal bleeding over the past two years. Had been recently evaluated by OB/Gyn while living in [place]. Attempted biopsy was unsuccessful due to pain and second attempt was deferred due to her thrombocytopenia. Has now returned to [place] for the summer and endorsed constant bleeding with passage of clots over the past 7-10d with associated lightheadedness and fatigue. Hgb 10.7 on admission. Pelvic US on presentation showed 1.8cm endometrial thickening. Evaluated by gyn this stay -- underwent D&C per Dr. Ortega on 6/6. Prior to procedure she was transfused platelets and given Vitamin K 5mg IV x1 as INR 1.18. Given an additional unit of platelets on 6/6 AM prior to procedure.  -- ongoing cares per gyn  -- endometrial curettings sent to pathology for reivew    Pancytopenia:    Per review of records available in Care Everywhere, she has hx of intermittently low cell counts in the past. Had been previously advised to follow up with a hematologist for further workup but she never did. On presentation this stay, WBC 3.3 with ANC 1700, hgb 10.7, platelets 41. It is thought that her anemia and thrombocytopenia may be due in part of her recent bleeding but there was mention that her counts were low prior to this. Additional workup pursued this stay, including iron studies, B12/folate and peripheral smear.   -- platelet counts and hgb remain low this AM  -- iron studies mostly unremarkable (Fe and ferritin nl, IBC and transferrin mildly low), B12 nl, folate pending  -- peripheral smear pending  -- ?if possibly related to  myelosuppression from long term Bactrim use  -- heme/onc consulted for further evaluation     COPD:   Chronic and stable on home inhalers.     DM II with neuropathy:   A1C 7.3 this stay.   Home meds: Metformin 1000mg BID, Levemir 42U BID, Novolog 28U w/breakfast, 30U w/lunch, 28U w/dinner and gabapentin 1200mg TID  -- Levemir and mealtime Novolog decreased on admission given NPO status for surgery    Recent Labs  Lab 06/06/18  1234 06/06/18  1039 06/06/18  0903 06/06/18  0219 06/05/18  2221 06/05/18  1746 06/05/18  1330   GLC  --   --   --   --   --   --  194*   * 165* 165* 143* 191* 241*  --      -- tolerating po intake postop so have resumed home doses of insulin     Obstructive sleep apnea:   Does not use CPAP.     Hypothyroidism:   TSH nl this stay. Conts on Synthroid     Chronic left heel ulcer:   Followed by wound clinic.   Wound RN consulted this stay.     Chronic pain on chronic narcotics:   Chronic and stable on gabapentin and dilaudid (uses 4mg po q4h prn).  No changes to pain meds made thi stay.    Hx of Diskitis/Osteomyelitis of the Spine:  On chronic suppressive therapy with Bactrim which has been continued.      Pain Assessment:  Current Pain Score 6/6/2018   Patient currently in pain? denies   Pain score (0-10) -   Pain location -   Pain descriptors -   Has issues with chronic pain as above. Continues on oral dilaudid (4mg po q4h prn) as per prior to admission.       FEN: no IVFs, lytes stable, regular diet  DVT Prophylaxis: PCDs  Code Status: Full Code    Disposition: Discharge pending stable labs, no ongoing vaginal bleeding and recommendations per subspecialists.     Sola Vizcarra    Interval History   Seen after return from the OR. Resting comfortably. Endorses some ongoing abdominal cramping and lightheadedness. Appetite good -- just finished lunch. Denies n/v. No significant sob/cough.     -Data reviewed today: I reviewed all new labs and imaging results over the last 24 hours.  I personally reviewed no images or EKG's today.    Physical Exam   Temp: 98.3  F (36.8  C) Temp src: Oral BP: 119/60 Pulse: 87 Heart Rate: 83 Resp: 16 SpO2: 93 % O2 Device: None (Room air)    Vitals:    06/05/18 1319 06/05/18 1724 06/06/18 0628   Weight: 119.7 kg (264 lb) 123 kg (271 lb 2.7 oz) 123.4 kg (272 lb 0.8 oz)     Vital Signs with Ranges  Temp:  [97  F (36.1  C)-99.5  F (37.5  C)] 98.3  F (36.8  C)  Pulse:  [87] 87  Heart Rate:  [] 83  Resp:  [9-26] 16  BP: ()/(53-85) 119/60  SpO2:  [92 %-99 %] 93 %  I/O last 3 completed shifts:  In: 679 [I.V.:479]  Out: -     Constitutional: Resting comfortably, alert and conversing appropriately, NAD  Respiratory: CTAB, no wheeze/rales/rhonchi, no increased work of breathing  Cardiovascular: HRRR, no MGR, trace LLE edema  GI: obese, S, mildly TTP over lower abd, +BS  Skin/Integumen: warm/dry  Other: +R BKA    Medications     sodium chloride 75 mL/hr at 06/06/18 0006       gabapentin (NEURONTIN) tablet 1,200 mg  1,200 mg Oral TID     insulin aspart  1-7 Units Subcutaneous TID AC     insulin aspart  1-5 Units Subcutaneous At Bedtime     insulin aspart  20 Units Subcutaneous TID w/meals     levothyroxine (SYNTHROID/LEVOTHROID) tablet 50 mcg  50 mcg Oral QPM     miconazole   Topical BID     potassium chloride  20 mEq Oral BID     sodium chloride (PF)  3 mL Intracatheter Q8H       Data     Recent Labs  Lab 06/06/18  0745 06/05/18  1330   WBC 2.9* 3.3*   HGB 9.7* 10.7*   MCV 99 99   PLT 43* 41*   INR 1.17* 1.18*   NA  --  141   POTASSIUM 3.9 3.9   CHLORIDE  --  107   CO2  --  25   BUN  --  10   CR  --  0.52   ANIONGAP  --  9   DESIREE  --  8.4*   GLC  --  194*       Recent Results (from the past 24 hour(s))   US Pelvic Complete with Transvaginal    Narrative    ULTRASOUND PELVIC COMPLETE WITH TRANSVAGINAL IMAGING  6/5/2018 2:30 PM      HISTORY:  Postmenopausal bleeding.    TECHNIQUE:  Transvaginal images were performed to better evaluate the  patient's uterus, ovaries  and endometrial stripe.    COMPARISON: None.    FINDINGS:  The uterus is measured at 9.1 x 3.7 x 3.2 cm. No fibroids  are evident. Endometrial stripe measures 1.8 cm and appears thickened  and heterogeneous. A few small nabothian cysts are noted within the  cervix. The ovaries are not visualized. No solid adnexal masses are  identified. Small amount of anechoic free pelvic fluid is present. The  exam was somewhat limited related to patient body habitus and  immobility.       Impression    IMPRESSION:   1. The endometrium is heterogeneous and abnormally thickened at 1.8  cm. Endometrial malignancy could have this appearance, and gynecologic  consultation is recommended.  2. Small amount of nonspecific free fluid in the pelvis.  3. Nonvisualization of the ovaries.    ANGELY WALLS MD

## 2018-06-06 NOTE — PLAN OF CARE
Problem: Patient Care Overview  Goal: Plan of Care/Patient Progress Review  Outcome: Improving  A&O x4. Full code. VSS on RA. Here d/t vaginal bleeding/ pancytopenia. Plan for biopsy today, has been NPO except ice since midnight. Received one platelet transfusion yesterday. Left heel wound dressing C/D/I. Right BKA. Up w/ 1 assist to BSC. Reported back pain 6/10 but refused medication. IV fluids infusing. BG check was 143. Fall precautions in place. D/C pending. Will cont to monitor.

## 2018-06-06 NOTE — PROGRESS NOTES
Consult dictated.    63-year-old female with pancytopenia.  This could be due to primary bone marrow pathology like myelodysplastic syndrome.  This could be also due to liver disease.  She has fatty liver.    Plan:  -We will get some labs for evaluation of pancytopenia  -Bone marrow biopsy will be arranged.  Sample to be sent for cytogenetics.  -Monitor CBC.  Transfuse to keep hemoglobin above 7.0 and platelet above 20.

## 2018-06-06 NOTE — PROGRESS NOTES
WOC consult for left heel, coccyx, abdomen, chest:  Pt in OR this morning, WOC not able to come back and see today, will plan to assess tomorrow a.m.  Recommend nursing remove wrap/dressings to LLE in the meantime, for cleansing and assessment.

## 2018-06-06 NOTE — PLAN OF CARE
Problem: Patient Care Overview  Goal: Plan of Care/Patient Progress Review  Outcome: No Change  Pt alert and oriented x4, slightly sleepy between cares, arrived to unit post op D&C @ 1235.  VSS on RA, (sats 90-94%)  Refusing oxygen and capnography.  Minimal amount of blood noted on chux when turning, pt can independently reposition. Up with lift due to R BKA.  L leg wrapped and WOC consulted(would not allow writer to remove).  abd folds/ creases reddened with slight skin tear, small ulceration noted on coccyx.  Pt refuses to have wound care and mepilex applied at this time.   C/o minimal pain, declines intervention. Tolerating regular diet, , covered with set dose and SSI.  Due to void.  Continue to monitor.

## 2018-06-07 ENCOUNTER — APPOINTMENT (OUTPATIENT)
Dept: ULTRASOUND IMAGING | Facility: CLINIC | Age: 63
DRG: 744 | End: 2018-06-07
Attending: INTERNAL MEDICINE
Payer: COMMERCIAL

## 2018-06-07 ENCOUNTER — SURGERY (OUTPATIENT)
Age: 63
End: 2018-06-07

## 2018-06-07 ENCOUNTER — ANESTHESIA EVENT (OUTPATIENT)
Dept: GASTROENTEROLOGY | Facility: CLINIC | Age: 63
DRG: 744 | End: 2018-06-07
Payer: COMMERCIAL

## 2018-06-07 ENCOUNTER — ANESTHESIA (OUTPATIENT)
Dept: GASTROENTEROLOGY | Facility: CLINIC | Age: 63
DRG: 744 | End: 2018-06-07
Payer: COMMERCIAL

## 2018-06-07 LAB
ANION GAP SERPL CALCULATED.3IONS-SCNC: 9 MMOL/L (ref 3–14)
BASOPHILS # BLD AUTO: 0 10E9/L (ref 0–0.2)
BASOPHILS NFR BLD AUTO: 0.3 %
BLD PROD TYP BPU: NORMAL
BUN SERPL-MCNC: 10 MG/DL (ref 7–30)
CALCIUM SERPL-MCNC: 8.7 MG/DL (ref 8.5–10.1)
CHLORIDE SERPL-SCNC: 107 MMOL/L (ref 94–109)
CO2 SERPL-SCNC: 26 MMOL/L (ref 20–32)
CREAT SERPL-MCNC: 0.48 MG/DL (ref 0.52–1.04)
DIFFERENTIAL METHOD BLD: ABNORMAL
EOSINOPHIL # BLD AUTO: 0.1 10E9/L (ref 0–0.7)
EOSINOPHIL NFR BLD AUTO: 1.6 %
ERYTHROCYTE [DISTWIDTH] IN BLOOD BY AUTOMATED COUNT: 14.4 % (ref 10–15)
GFR SERPL CREATININE-BSD FRML MDRD: >90 ML/MIN/1.7M2
GLUCOSE BLDC GLUCOMTR-MCNC: 227 MG/DL (ref 70–99)
GLUCOSE BLDC GLUCOMTR-MCNC: 232 MG/DL (ref 70–99)
GLUCOSE BLDC GLUCOMTR-MCNC: 233 MG/DL (ref 70–99)
GLUCOSE BLDC GLUCOMTR-MCNC: 296 MG/DL (ref 70–99)
GLUCOSE BLDC GLUCOMTR-MCNC: 327 MG/DL (ref 70–99)
GLUCOSE BLDC GLUCOMTR-MCNC: 374 MG/DL (ref 70–99)
GLUCOSE SERPL-MCNC: 206 MG/DL (ref 70–99)
HCT VFR BLD AUTO: 28.4 % (ref 35–47)
HGB BLD-MCNC: 9.6 G/DL (ref 11.7–15.7)
IMM GRANULOCYTES # BLD: 0 10E9/L (ref 0–0.4)
IMM GRANULOCYTES NFR BLD: 0.5 %
KAPPA LC UR-MCNC: 2.3 MG/DL (ref 0.33–1.94)
KAPPA LC/LAMBDA SER: 1.03 {RATIO} (ref 0.26–1.65)
LAMBDA LC SERPL-MCNC: 2.23 MG/DL (ref 0.57–2.63)
LDH SERPL L TO P-CCNC: 213 U/L (ref 81–234)
LYMPHOCYTES # BLD AUTO: 1 10E9/L (ref 0.8–5.3)
LYMPHOCYTES NFR BLD AUTO: 24.6 %
MCH RBC QN AUTO: 33.8 PG (ref 26.5–33)
MCHC RBC AUTO-ENTMCNC: 33.8 G/DL (ref 31.5–36.5)
MCV RBC AUTO: 100 FL (ref 78–100)
MONOCYTES # BLD AUTO: 0.3 10E9/L (ref 0–1.3)
MONOCYTES NFR BLD AUTO: 7 %
NEUTROPHILS # BLD AUTO: 2.6 10E9/L (ref 1.6–8.3)
NEUTROPHILS NFR BLD AUTO: 66 %
NRBC # BLD AUTO: 0 10*3/UL
NRBC BLD AUTO-RTO: 1 /100
NUM BPU REQUESTED: 1
PLATELET # BLD AUTO: 42 10E9/L (ref 150–450)
POTASSIUM SERPL-SCNC: 4.1 MMOL/L (ref 3.4–5.3)
RBC # BLD AUTO: 2.84 10E12/L (ref 3.8–5.2)
RETICS # AUTO: 144.3 10E9/L (ref 25–95)
RETICS/RBC NFR AUTO: 5.1 % (ref 0.5–2)
SODIUM SERPL-SCNC: 142 MMOL/L (ref 133–144)
WBC # BLD AUTO: 3.9 10E9/L (ref 4–11)

## 2018-06-07 PROCEDURE — 00000058 ZZHCL STATISTIC BONE MARROW ASP PERF TC 38220: Performed by: INTERNAL MEDICINE

## 2018-06-07 PROCEDURE — 83883 ASSAY NEPHELOMETRY NOT SPEC: CPT | Performed by: INTERNAL MEDICINE

## 2018-06-07 PROCEDURE — 25000132 ZZH RX MED GY IP 250 OP 250 PS 637: Performed by: HOSPITALIST

## 2018-06-07 PROCEDURE — 88313 SPECIAL STAINS GROUP 2: CPT | Mod: 26,59 | Performed by: INTERNAL MEDICINE

## 2018-06-07 PROCEDURE — 25000132 ZZH RX MED GY IP 250 OP 250 PS 637: Performed by: INTERNAL MEDICINE

## 2018-06-07 PROCEDURE — 85045 AUTOMATED RETICULOCYTE COUNT: CPT | Performed by: PATHOLOGY

## 2018-06-07 PROCEDURE — 88264 CHROMOSOME ANALYSIS 20-25: CPT | Performed by: INTERNAL MEDICINE

## 2018-06-07 PROCEDURE — 85025 COMPLETE CBC W/AUTO DIFF WBC: CPT | Performed by: PATHOLOGY

## 2018-06-07 PROCEDURE — 00000146 ZZHCL STATISTIC GLUCOSE BY METER IP

## 2018-06-07 PROCEDURE — 88305 TISSUE EXAM BY PATHOLOGIST: CPT | Performed by: INTERNAL MEDICINE

## 2018-06-07 PROCEDURE — 40001005 ZZHCL STATISTIC FLOW >15 ABY TC 88189: Performed by: INTERNAL MEDICINE

## 2018-06-07 PROCEDURE — 86334 IMMUNOFIX E-PHORESIS SERUM: CPT | Performed by: INTERNAL MEDICINE

## 2018-06-07 PROCEDURE — 25000125 ZZHC RX 250: Performed by: NURSE ANESTHETIST, CERTIFIED REGISTERED

## 2018-06-07 PROCEDURE — 84165 PROTEIN E-PHORESIS SERUM: CPT | Performed by: INTERNAL MEDICINE

## 2018-06-07 PROCEDURE — 85097 BONE MARROW INTERPRETATION: CPT | Performed by: INTERNAL MEDICINE

## 2018-06-07 PROCEDURE — 88305 TISSUE EXAM BY PATHOLOGIST: CPT | Mod: 26 | Performed by: INTERNAL MEDICINE

## 2018-06-07 PROCEDURE — 88311 DECALCIFY TISSUE: CPT | Performed by: INTERNAL MEDICINE

## 2018-06-07 PROCEDURE — 88185 FLOWCYTOMETRY/TC ADD-ON: CPT | Performed by: INTERNAL MEDICINE

## 2018-06-07 PROCEDURE — 82784 ASSAY IGA/IGD/IGG/IGM EACH: CPT | Performed by: INTERNAL MEDICINE

## 2018-06-07 PROCEDURE — 88280 CHROMOSOME KARYOTYPE STUDY: CPT | Performed by: INTERNAL MEDICINE

## 2018-06-07 PROCEDURE — 83615 LACTATE (LD) (LDH) ENZYME: CPT | Performed by: INTERNAL MEDICINE

## 2018-06-07 PROCEDURE — G0463 HOSPITAL OUTPT CLINIC VISIT: HCPCS

## 2018-06-07 PROCEDURE — 88311 DECALCIFY TISSUE: CPT | Mod: 26 | Performed by: INTERNAL MEDICINE

## 2018-06-07 PROCEDURE — 97602 WOUND(S) CARE NON-SELECTIVE: CPT

## 2018-06-07 PROCEDURE — 88237 TISSUE CULTURE BONE MARROW: CPT | Performed by: INTERNAL MEDICINE

## 2018-06-07 PROCEDURE — 88184 FLOWCYTOMETRY/ TC 1 MARKER: CPT | Performed by: INTERNAL MEDICINE

## 2018-06-07 PROCEDURE — 40000948 ZZHCL STATISTIC BONE MARROW ASP TC 85097: Performed by: INTERNAL MEDICINE

## 2018-06-07 PROCEDURE — 00000008 ZZHCL STATISTIC ADDL BM ASP PERF PF 38220: Performed by: INTERNAL MEDICINE

## 2018-06-07 PROCEDURE — 00000402 ZZHCL STATISTIC TOTAL PROTEIN: Performed by: INTERNAL MEDICINE

## 2018-06-07 PROCEDURE — 37000009 ZZH ANESTHESIA TECHNICAL FEE, EACH ADDTL 15 MIN: Performed by: PATHOLOGY

## 2018-06-07 PROCEDURE — 40000847 ZZHCL STATISTIC MORPHOLOGY W/INTERP HISTOLOGY TC 85060: Performed by: INTERNAL MEDICINE

## 2018-06-07 PROCEDURE — 88313 SPECIAL STAINS GROUP 2: CPT | Performed by: INTERNAL MEDICINE

## 2018-06-07 PROCEDURE — 40000795 ZZHCL STATISTIC DNA PROCESS AND HOLD: Performed by: PATHOLOGY

## 2018-06-07 PROCEDURE — 99232 SBSQ HOSP IP/OBS MODERATE 35: CPT | Performed by: INTERNAL MEDICINE

## 2018-06-07 PROCEDURE — 76705 ECHO EXAM OF ABDOMEN: CPT

## 2018-06-07 PROCEDURE — 38221 DX BONE MARROW BIOPSIES: CPT | Performed by: INTERNAL MEDICINE

## 2018-06-07 PROCEDURE — 40000010 ZZH STATISTIC ANES STAT CODE-CRNA PER MINUTE: Performed by: PATHOLOGY

## 2018-06-07 PROCEDURE — 38221 DX BONE MARROW BIOPSIES: CPT | Performed by: PATHOLOGY

## 2018-06-07 PROCEDURE — 00000159 ZZHCL STATISTIC H-SEND OUTS PREP: Performed by: INTERNAL MEDICINE

## 2018-06-07 PROCEDURE — 36415 COLL VENOUS BLD VENIPUNCTURE: CPT | Performed by: INTERNAL MEDICINE

## 2018-06-07 PROCEDURE — 12000000 ZZH R&B MED SURG/OB

## 2018-06-07 PROCEDURE — 40000424 ZZHCL STATISTIC BONE MARROW CORE PERF TC 38221: Performed by: INTERNAL MEDICINE

## 2018-06-07 PROCEDURE — 80048 BASIC METABOLIC PNL TOTAL CA: CPT | Performed by: INTERNAL MEDICINE

## 2018-06-07 PROCEDURE — 25000128 H RX IP 250 OP 636: Performed by: NURSE ANESTHETIST, CERTIFIED REGISTERED

## 2018-06-07 PROCEDURE — 25000131 ZZH RX MED GY IP 250 OP 636 PS 637: Performed by: INTERNAL MEDICINE

## 2018-06-07 PROCEDURE — 37000008 ZZH ANESTHESIA TECHNICAL FEE, 1ST 30 MIN: Performed by: PATHOLOGY

## 2018-06-07 PROCEDURE — 07DR3ZX EXTRACTION OF ILIAC BONE MARROW, PERCUTANEOUS APPROACH, DIAGNOSTIC: ICD-10-PCS | Performed by: PATHOLOGY

## 2018-06-07 PROCEDURE — 85060 BLOOD SMEAR INTERPRETATION: CPT | Mod: 59 | Performed by: INTERNAL MEDICINE

## 2018-06-07 RX ORDER — DEXAMETHASONE SODIUM PHOSPHATE 4 MG/ML
4 INJECTION, SOLUTION INTRA-ARTICULAR; INTRALESIONAL; INTRAMUSCULAR; INTRAVENOUS; SOFT TISSUE EVERY 10 MIN PRN
Status: DISCONTINUED | OUTPATIENT
Start: 2018-06-07 | End: 2018-06-07

## 2018-06-07 RX ORDER — SODIUM CHLORIDE, SODIUM LACTATE, POTASSIUM CHLORIDE, CALCIUM CHLORIDE 600; 310; 30; 20 MG/100ML; MG/100ML; MG/100ML; MG/100ML
INJECTION, SOLUTION INTRAVENOUS CONTINUOUS PRN
Status: DISCONTINUED | OUTPATIENT
Start: 2018-06-07 | End: 2018-06-07

## 2018-06-07 RX ORDER — ONDANSETRON 2 MG/ML
4 INJECTION INTRAMUSCULAR; INTRAVENOUS EVERY 30 MIN PRN
Status: DISCONTINUED | OUTPATIENT
Start: 2018-06-07 | End: 2018-06-07

## 2018-06-07 RX ORDER — LORAZEPAM 2 MG/ML
.5-1 INJECTION INTRAMUSCULAR
Status: DISCONTINUED | OUTPATIENT
Start: 2018-06-07 | End: 2018-06-07 | Stop reason: HOSPADM

## 2018-06-07 RX ORDER — FLUMAZENIL 0.1 MG/ML
0.2 INJECTION, SOLUTION INTRAVENOUS
Status: CANCELLED | OUTPATIENT
Start: 2018-06-07 | End: 2018-06-08

## 2018-06-07 RX ORDER — LIDOCAINE HYDROCHLORIDE 10 MG/ML
8-10 INJECTION, SOLUTION EPIDURAL; INFILTRATION; INTRACAUDAL; PERINEURAL
Status: DISCONTINUED | OUTPATIENT
Start: 2018-06-07 | End: 2018-06-07 | Stop reason: HOSPADM

## 2018-06-07 RX ORDER — HYDROMORPHONE HYDROCHLORIDE 1 MG/ML
.3-.5 INJECTION, SOLUTION INTRAMUSCULAR; INTRAVENOUS; SUBCUTANEOUS EVERY 10 MIN PRN
Status: DISCONTINUED | OUTPATIENT
Start: 2018-06-07 | End: 2018-06-07

## 2018-06-07 RX ORDER — PROMETHAZINE HYDROCHLORIDE 25 MG/ML
12.5 INJECTION, SOLUTION INTRAMUSCULAR; INTRAVENOUS
Status: DISCONTINUED | OUTPATIENT
Start: 2018-06-07 | End: 2018-06-07

## 2018-06-07 RX ORDER — NALOXONE HYDROCHLORIDE 0.4 MG/ML
.1-.4 INJECTION, SOLUTION INTRAMUSCULAR; INTRAVENOUS; SUBCUTANEOUS
Status: DISCONTINUED | OUTPATIENT
Start: 2018-06-07 | End: 2018-06-07

## 2018-06-07 RX ORDER — MEPERIDINE HYDROCHLORIDE 25 MG/ML
12.5 INJECTION INTRAMUSCULAR; INTRAVENOUS; SUBCUTANEOUS
Status: DISCONTINUED | OUTPATIENT
Start: 2018-06-07 | End: 2018-06-07

## 2018-06-07 RX ORDER — DIMENHYDRINATE 50 MG/ML
12.5 INJECTION, SOLUTION INTRAMUSCULAR; INTRAVENOUS
Status: DISCONTINUED | OUTPATIENT
Start: 2018-06-07 | End: 2018-06-07

## 2018-06-07 RX ORDER — SODIUM CHLORIDE, SODIUM LACTATE, POTASSIUM CHLORIDE, CALCIUM CHLORIDE 600; 310; 30; 20 MG/100ML; MG/100ML; MG/100ML; MG/100ML
INJECTION, SOLUTION INTRAVENOUS CONTINUOUS
Status: DISCONTINUED | OUTPATIENT
Start: 2018-06-07 | End: 2018-06-07

## 2018-06-07 RX ORDER — NALOXONE HYDROCHLORIDE 0.4 MG/ML
.1-.4 INJECTION, SOLUTION INTRAMUSCULAR; INTRAVENOUS; SUBCUTANEOUS
Status: CANCELLED | OUTPATIENT
Start: 2018-06-07 | End: 2018-06-08

## 2018-06-07 RX ORDER — LIDOCAINE HYDROCHLORIDE 20 MG/ML
INJECTION, SOLUTION INFILTRATION; PERINEURAL PRN
Status: DISCONTINUED | OUTPATIENT
Start: 2018-06-07 | End: 2018-06-07

## 2018-06-07 RX ORDER — ALBUTEROL SULFATE 0.83 MG/ML
2.5 SOLUTION RESPIRATORY (INHALATION)
Status: DISCONTINUED | OUTPATIENT
Start: 2018-06-07 | End: 2018-06-07 | Stop reason: HOSPADM

## 2018-06-07 RX ORDER — ONDANSETRON 4 MG/1
4 TABLET, ORALLY DISINTEGRATING ORAL EVERY 30 MIN PRN
Status: DISCONTINUED | OUTPATIENT
Start: 2018-06-07 | End: 2018-06-07

## 2018-06-07 RX ORDER — FENTANYL CITRATE 50 UG/ML
25-50 INJECTION, SOLUTION INTRAMUSCULAR; INTRAVENOUS EVERY 5 MIN PRN
Status: DISCONTINUED | OUTPATIENT
Start: 2018-06-07 | End: 2018-06-07 | Stop reason: HOSPADM

## 2018-06-07 RX ORDER — PROPOFOL 10 MG/ML
INJECTION, EMULSION INTRAVENOUS PRN
Status: DISCONTINUED | OUTPATIENT
Start: 2018-06-07 | End: 2018-06-07

## 2018-06-07 RX ADMIN — POTASSIUM CHLORIDE 20 MEQ: 1500 TABLET, EXTENDED RELEASE ORAL at 12:35

## 2018-06-07 RX ADMIN — DEXMEDETOMIDINE HYDROCHLORIDE 8 MCG: 100 INJECTION, SOLUTION INTRAVENOUS at 10:36

## 2018-06-07 RX ADMIN — SULFAMETHOXAZOLE AND TRIMETHOPRIM 1 TABLET: 800; 160 TABLET ORAL at 12:34

## 2018-06-07 RX ADMIN — PROPOFOL 10 MG: 10 INJECTION, EMULSION INTRAVENOUS at 10:44

## 2018-06-07 RX ADMIN — PROPOFOL 10 MG: 10 INJECTION, EMULSION INTRAVENOUS at 10:48

## 2018-06-07 RX ADMIN — SODIUM CHLORIDE, POTASSIUM CHLORIDE, SODIUM LACTATE AND CALCIUM CHLORIDE: 600; 310; 30; 20 INJECTION, SOLUTION INTRAVENOUS at 10:33

## 2018-06-07 RX ADMIN — PROPOFOL 20 MG: 10 INJECTION, EMULSION INTRAVENOUS at 10:37

## 2018-06-07 RX ADMIN — PROPOFOL 10 MG: 10 INJECTION, EMULSION INTRAVENOUS at 10:42

## 2018-06-07 RX ADMIN — PROPOFOL 10 MG: 10 INJECTION, EMULSION INTRAVENOUS at 10:39

## 2018-06-07 RX ADMIN — GABAPENTIN 1200 MG: 600 TABLET, FILM COATED ORAL at 18:17

## 2018-06-07 RX ADMIN — INSULIN DETEMIR 42 UNITS: 100 INJECTION, SOLUTION SUBCUTANEOUS at 21:51

## 2018-06-07 RX ADMIN — LIDOCAINE HYDROCHLORIDE 60 MG: 20 INJECTION, SOLUTION INFILTRATION; PERINEURAL at 10:34

## 2018-06-07 RX ADMIN — INSULIN ASPART 2 UNITS: 100 INJECTION, SOLUTION INTRAVENOUS; SUBCUTANEOUS at 13:21

## 2018-06-07 RX ADMIN — INSULIN DETEMIR 42 UNITS: 100 INJECTION, SOLUTION SUBCUTANEOUS at 09:00

## 2018-06-07 RX ADMIN — MICONAZOLE NITRATE: 20 CREAM TOPICAL at 21:52

## 2018-06-07 RX ADMIN — SULFAMETHOXAZOLE AND TRIMETHOPRIM 1 TABLET: 800; 160 TABLET ORAL at 21:51

## 2018-06-07 RX ADMIN — GABAPENTIN 1200 MG: 600 TABLET, FILM COATED ORAL at 12:34

## 2018-06-07 RX ADMIN — PROPOFOL 10 MG: 10 INJECTION, EMULSION INTRAVENOUS at 10:40

## 2018-06-07 RX ADMIN — PROPOFOL 10 MG: 10 INJECTION, EMULSION INTRAVENOUS at 10:46

## 2018-06-07 RX ADMIN — GABAPENTIN 1200 MG: 600 TABLET, FILM COATED ORAL at 21:55

## 2018-06-07 RX ADMIN — INSULIN ASPART 2 UNITS: 100 INJECTION, SOLUTION INTRAVENOUS; SUBCUTANEOUS at 19:02

## 2018-06-07 RX ADMIN — POTASSIUM CHLORIDE 20 MEQ: 1500 TABLET, EXTENDED RELEASE ORAL at 21:51

## 2018-06-07 RX ADMIN — PROPOFOL 30 MG: 10 INJECTION, EMULSION INTRAVENOUS at 10:34

## 2018-06-07 RX ADMIN — LEVOTHYROXINE SODIUM 50 MCG: 50 TABLET ORAL at 21:51

## 2018-06-07 ASSESSMENT — LIFESTYLE VARIABLES: TOBACCO_USE: 1

## 2018-06-07 ASSESSMENT — COPD QUESTIONNAIRES: COPD: 1

## 2018-06-07 NOTE — ANESTHESIA CARE TRANSFER NOTE
Patient: Fani Escobedo    Procedure(s):  bone marrow biopsy    Diagnosis: pancytopenia  Diagnosis Additional Information: No value filed.    Anesthesia Type:   MAC     Note:  Airway :Room Air  Patient transferred to:Phase II  Comments: Pt to ENDO PACU on room air, airway patent, VSS.  Report to RN.Handoff Report: Identifed the Patient, Identified the Reponsible Provider, Reviewed the pertinent medical history, Discussed the surgical course, Reviewed Intra-OP anesthesia mangement and issues during anesthesia, Set expectations for post-procedure period and Allowed opportunity for questions and acknowledgement of understanding      Vitals: (Last set prior to Anesthesia Care Transfer)    CRNA VITALS  6/7/2018 1025 - 6/7/2018 1103      6/7/2018             Resp Rate (set): 10                Electronically Signed By: SHAUNA Worthington CRNA  June 7, 2018  11:03 AM

## 2018-06-07 NOTE — CONSULTS
Consult Date:  06/06/2018      REQUESTING PHYSICIAN:    This consult has been requested by Dr. Chintan Mao for pancytopenia.      HISTORY OF PRESENT ILLNESS:    Ms. Fani Escobedo is a 63-year-old female with multiple medical problems including diabetes mellitus and COPD. Patient presented to the emergency room on 06/05/2018 with vaginal bleeding.  She has vaginal bleeding going on and off almost 2 years.  She had multiple investigations done in the ER:   -WBC of 3.3, hemoglobin of 10.7 and platelet of 41.  MCV of 99.   -Electrolytes are normal.   -Normal TSH.   -Pelvic ultrasound revealed thickening of endometrial stripe.      Patient has been evaluated by Dr. Ortega from Gynecology.  Patient had D and C done today.  Her bleeding is much less.      Hematology has been consulted because of pancytopenia.  I reviewed her previous labs. Patient has been cytopenic for many years.  She has anemia and thrombocytopenia going back to 2013.  Usually her platelets have been more than 100.  Most of the time, hemoglobin is around 10.  Her WBC used to be normal.  Now it is low.      In the hospital, patient had multiple workups done for cytopenia.  Iron, ferritin, vitamin B12 and folate are all normal.      Patient denies bleeding from other sites.  No bleeding from ear, nose or throat.  No coughing or vomiting of blood.  No blood in the stool.      Discussed with her regarding alcohol use. Patient said that when she was young, she used to drink, but she does not drink now.      Discussed regarding any history of hepatitis.  She denies that.      REVIEW OF SYSTEMS:  She feels weak.  No headache.  Occasional dizziness.  No chest pain or difficulty breathing.  No nausea or vomiting.  No fever or chills.  She has not felt any lump.  All other review of systems are negative.      ALLERGIES:  Reviewed.      MEDICATIONS:  Reviewed.      PAST MEDICAL HISTORY:   1.  Diabetes mellitus.   2.  COPD.     3.  Neuropathy.   4.  Obstructive sleep  apnea.   5.  Hypothyroidism.   6.  Depression.   7.  Chronic anemia.   8.  Chronic thrombocytopenia.   9.  History of MRSA diskitis and osteomyelitis.   10.  Right knee-below amputation.     11.  Back surgery.     12.  Toe amputation.     13.  Osteomyelitis.      SOCIAL HISTORY:     -She is a smoker of a pack a day for almost 40 years.    -She does not drink now.  She used to drink when she was young.      PHYSICAL EXAMINATION:   GENERAL:  She is alert, oriented x 3.   VITALS:  Reviewed.     Rest of the system not examined.      LABORATORY DATA:  Reviewed.      ASSESSMENT:   1.  Fani Escobedo is a 63-year-old female with pancytopenia.   2.  Vaginal bleeding.   3.  Multiple other medical problems including diabetes mellitus, hypothyroidism and sleep apnea.      RECOMMENDATIONS:   1.  I had a long discussion with the patient.  CBC was reviewed.  I told her that she has chronic anemia and thrombocytopenia.Now she has leukopenia.  She has pancytopenia.      Different causes of pancytopenia discussed.  She already had multiple workups.  Iron studies, vitamin B12 and folate are all normal.      Discussed regarding primary bone marrow pathology.  Cytopenia could be due to myelodysplastic syndrome.  Also discussed regarding other causes including liver disease. Patient is overweight.  She has fatty liver.  That can cause hepatic dysfunction and the splenomegaly can cause pancytopenia.        2.  Discussed regarding further workup.  We will get some labs.  Discussed regarding bone marrow biopsy.  Procedure was explained.  She is agreeable for it.  To be scheduled under sedation.  Sample will be sent for cytogenetics.      3.  Complication of pancytopenia discussed.  I explained to her that with thrombocytopenia, she is at risk of bleeding.  She has been having vaginal bleeding, which probably is mainly due to endometrial abnormality.  No significant bleeding from other sites.  She had leukopenia which will increase the risk of  infection.  She is anemic.  That will cause increased fatigue.      4.  Patient has fatty liver.  Abdominal ultrasound on 10/14/2013 had revealed fatty liver.  We will repeat ultrasound.      5.  She had multiple questions, which were all answered.  We will continue to follow her.      Thanks for the consult.      Total time spent 60 minutes, most than 50% of time was spent in counseling and coordination of her care.         NORMAN JONES MD             D: 2018   T: 2018   MT: TOMÁS      Name:     BELKYS SHELL   MRN:      [id number]        Account:       [id number]   :      1955           Consult Date:  2018      Document: [id number]

## 2018-06-07 NOTE — ANESTHESIA PREPROCEDURE EVALUATION
Anesthesia Evaluation     . Pt has had prior anesthetic. Type: General           ROS/MED HX    ENT/Pulmonary:     (+)sleep apnea, tobacco use, COPD, uses CPAP , . .    Neurologic:     (+)neuropathy - Diabetic peripheral neuropathy,     Cardiovascular:     (+) ----. : . . . :. . Previous cardiac testing date:results:date: results:ECG reviewed date:6/2018 results:NSR date: results:          METS/Exercise Tolerance:     Hematologic: Comments: Pancytopenia requiring bone marrow biopsy    (+) Other Hematologic Disorder-Pancytopenia      Musculoskeletal:         GI/Hepatic:        (-) GERD   Renal/Genitourinary:         Endo: Comment: BMI 38    (+) type II DM Using insulin Diabetic complications: neuropathy, thyroid problem hypothyroidism, Obesity, .      Psychiatric:         Infectious Disease:   (+) MRSA,       Malignancy:         Other:                     Physical Exam  Normal systems: dental    Airway   Mallampati: III  TM distance: >3 FB  Neck ROM: full    Dental     Cardiovascular   Rhythm and rate: regular      Pulmonary    breath sounds clear to auscultation                    Anesthesia Plan      History & Physical Review  History and physical reviewed and following examination; no interval change.    ASA Status:  3 .    NPO Status:  > 8 hours    Plan for MAC Reason for MAC:  Difficulty with conscious sedation (QS)  PONV prophylaxis:  Ondansetron (or other 5HT-3)       Postoperative Care  Postoperative pain management:  Multi-modal analgesia.      Consents  Anesthetic plan, risks, benefits and alternatives discussed with:  Patient..        Procedure: Procedure(s):  BIOPSY BONE MARROW  Preop diagnosis: pancytopenia    Allergies   Allergen Reactions     Adhesive Tape      Cephalosporins Anaphylaxis     Naproxen Anaphylaxis     Penicillins      Augmentin Rash     Benadryl [Anti-Itch] Rash     Morphine Hcl Itching and Rash     Past Medical History:   Diagnosis Date     Anemia     iron deficiency     Asthma       Charcot foot due to diabetes mellitus (H)     (R) foot -> BKA     COPD (chronic obstructive pulmonary disease) (H)      Delirium      Depression      Diabetes mellitus (H)     type II     Diskitis 5/2013     Hypothyroid      Obesity     bmi 46     USHA (obstructive sleep apnea)     does not use CPAP     Osteomyelitis (H)      Peripheral neuropathy      Sepsis (H)     MRSA bacteremia     Tobacco abuse      Past Surgical History:   Procedure Laterality Date     AMPUTATE LEG BELOW KNEE  4/14/2012    Procedure:AMPUTATE LEG BELOW KNEE; right leg below knee amputation; Surgeon:WILMAR LUI; Location:[facility] OR     AMPUTATE TOE(S)  5/1/2013    Procedure: AMPUTATE TOE(S);  PARTIAL LEFT 3RD TOE AMPUTATION;  Surgeon: Juancarlos Gamez DPM;  Location: [facility] OR     AMPUTATION      (L) 2nd toe     ANESTHESIA OUT OF OR MRI  6/11/2013    Procedure: ANESTHESIA OUT OF OR MRI;  MRI UNDER GENERAL ANESTHESIA;  Surgeon: Provider, Generic Anesthesia;  Location: [facility] OR     ANESTHESIA OUT OF OR MRI  9/4/2013    Procedure: ANESTHESIA OUT OF OR MRI;  ANESTHESIA OUT OF OR MRI;  Surgeon: Provider, Generic Anesthesia;  Location:  OR     EXPLORE SPINE, REMOVE HARDWARE, COMBINED  10/11/2013    Procedure: COMBINED EXPLORE SPINE, REMOVE HARDWARE;  EXPLORATION AND REVISION POSTERIOR THORACIC WOUND WITH WOUND VAC PLACEMENT.;  Surgeon: Thomas Ellis MD;  Location:  OR     FUSION SPINE ANTERIOR THORACIC ONE LEVEL  9/5/2013    Procedure: FUSION SPINE ANTERIOR THORACIC ONE LEVEL;  Right Thoracotomy For  T7-T8 Thorasic Vertebral Body Resection with Strut Graft, [place] Instrumentation T6-T9, BMP, Pyramesh and Intra-operative Neuro Monitoring ;  Surgeon: Thomas Ellis MD;  Location:  OR     INCISION AND DRAINAGE RECTUM, COMBINED  6/11/2012    Procedure: COMBINED INCISION AND DRAINAGE RECTUM;  I&D ABSCESS RIGHT BUTTOCK (MRSA );  Surgeon: Christos Linton MD;  Location:  OR     INCISION AND DRAINAGE SPINE, CLOSE WOUND,  COMBINED  10/14/2013    Procedure: COMBINED INCISION AND DRAINAGE SPINE, CLOSE WOUND;  EXPLORATION/REVISION POSTERIOR THORACIC WOUND COMPLEX 15 CM. ;  Surgeon: Thomas Ellis MD;  Location:  OR     IRRIGATION AND DEBRIDEMENT TRUNK, COMBINED  6/10/2012    Procedure: COMBINED IRRIGATION AND DEBRIDEMENT TRUNK;  Incision and drainage abscess right buttock;  Surgeon: Christos Linton MD;  Location:  OR     OPTICAL TRACKING SYSTEM FUSION POSTERIOR SPINE THORACIC THREE+ LEVELS  9/6/2013    Procedure: OPTICAL TRACKING SYSTEM FUSION POSTERIOR SPINE THORACIC THREE+ LEVELS;  T4-T11 POSTERIOR LATERAL PEDICLE SCREW INSTRUMENTATION WITH IMAGE GUIDANCE AND NEURO-MONITORING;  Surgeon: Thomas Ellis MD;  Location:  OR     Social History   Substance Use Topics     Smoking status: Current Some Day Smoker     Years: 45.00     Smokeless tobacco: Never Used     Alcohol use Yes      Comment: occ     Prior to Admission medications    Medication Sig Start Date End Date Taking? Authorizing Provider   ASPIRIN ADULT LOW STRENGTH PO Take 81 mg by mouth twice a week    Yes Reported, Patient   B Complex Vitamins (VITAMIN  B COMPLEX) tablet Take 1 tablet by mouth daily.   Yes Reported, Patient   calcium carb 1250 mg, 500 mg Ione,/vitamin D 200 units (OSCAL WITH D) 500-200 MG-UNIT per tablet Take 1 tablet by mouth 3 times daily (with meals) 10/15/13  Yes Charlene Alonso, APRN CNP   ferrous sulfate 325 (65 FE) MG tablet Take 1 tablet by mouth 3 times daily (with meals)    Yes Unknown, Entered By History   GABAPENTIN PO Take 1,200 mg by mouth 3 times daily 2 x 600 mg tab   Yes Unknown, Entered By History   Insulin Aspart (NOVOLOG SC) Inject 28 Units Subcutaneous daily (with breakfast)   Yes Unknown, Entered By History   Insulin Aspart (NOVOLOG SC) Inject 30 Units Subcutaneous daily (with lunch)   Yes Unknown, Entered By History   Insulin Aspart (NOVOLOG SC) Inject 28 Units Subcutaneous daily (with dinner)   Yes  "Unknown, Entered By History   insulin detemir (LEVEMIR FLEXPEN/FLEXTOUCH) 100 UNIT/ML injection Inject 42 Units Subcutaneous 2 times daily   Yes Unknown, Entered By History   Lactobacillus (ACIDOPHILUS PO) Take 1 capsule by mouth daily   Yes Unknown, Entered By History   LEVOTHYROXINE SODIUM PO Take 50 mcg by mouth daily    Yes Reported, Patient   METFORMIN HCL PO Take 1,000 mg by mouth 2 times daily (with meals)   Yes Unknown, Entered By History   potassium chloride (KLOR-CON) 8 MEQ CR tablet Take 8 mEq by mouth daily in the afternoon   Yes Unknown, Entered By History   potassium chloride (KLOR-CON) 8 MEQ CR tablet Take 16 mEq by mouth 2 times daily AM and PM in addition to afternoon dose   Yes Unknown, Entered By History   sulfamethoxazole-trimethoprim (BACTRIM DS/SEPTRA DS) 800-160 MG per tablet Take 1 tablet by mouth 2 times daily   Yes Unknown, Entered By History   HYDROmorphone (DILAUDID) 4 MG tablet Take 1 tablet (4 mg) by mouth every 4 hours as needed  Patient taking differently: Take 4 mg by mouth every 4 hours as needed (\"Really bad pain\")  10/17/13   Chapo Padilla PA-C     Current Facility-Administered Medications Ordered in Epic   Medication Dose Route Frequency Last Rate Last Dose     acetaminophen (TYLENOL) Suppository 650 mg  650 mg Rectal Q4H PRN         acetaminophen (TYLENOL) tablet 650 mg  650 mg Oral Q4H PRN         albuterol neb solution 2.5 mg  2.5 mg Nebulization Q2H PRN         bisacodyl (DULCOLAX) Suppository 10 mg  10 mg Rectal Daily PRN         glucose gel 15-30 g  15-30 g Oral Q15 Min PRN        Or     dextrose 50 % injection 25-50 mL  25-50 mL Intravenous Q15 Min PRN        Or     glucagon injection 1 mg  1 mg Subcutaneous Q15 Min PRN         gabapentin (NEURONTIN) tablet 1,200 mg  1,200 mg Oral TID   1,200 mg at 06/06/18 2243     HYDROmorphone (DILAUDID) tablet 4 mg  4 mg Oral Q4H PRN         insulin aspart (NovoLOG) inj (RAPID ACTING)  28 Units Subcutaneous QAM AC         " insulin aspart (NovoLOG) inj (RAPID ACTING)  30 Units Subcutaneous Daily with lunch         insulin aspart (NovoLOG) inj (RAPID ACTING)  28 Units Subcutaneous Daily with supper   28 Units at 06/06/18 1633     insulin aspart (NovoLOG) inj (RAPID ACTING)  1-7 Units Subcutaneous TID AC   5 Units at 06/06/18 1633     insulin aspart (NovoLOG) inj (RAPID ACTING)  1-5 Units Subcutaneous At Bedtime   4 Units at 06/06/18 2243     insulin detemir (LEVEMIR) injection 42 Units  42 Units Subcutaneous BID   42 Units at 06/06/18 2242     levothyroxine (SYNTHROID/LEVOTHROID) tablet 50 mcg  50 mcg Oral QPM   50 mcg at 06/06/18 2243     lidocaine (LMX4) cream   Topical Q1H PRN         melatonin tablet 1 mg  1 mg Oral At Bedtime PRN         miconazole (MICATIN) 2 % cream   Topical BID         naloxone (NARCAN) injection 0.1-0.4 mg  0.1-0.4 mg Intravenous Q2 Min PRN         ondansetron (ZOFRAN-ODT) ODT tab 4 mg  4 mg Oral Q6H PRN        Or     ondansetron (ZOFRAN) injection 4 mg  4 mg Intravenous Q6H PRN         polyethylene glycol (MIRALAX/GLYCOLAX) Packet 17 g  17 g Oral Daily PRN         potassium chloride (KLOR-CON) Packet 20-40 mEq  20-40 mEq Oral or Feeding Tube Q2H PRN         potassium chloride 10 mEq in 100 mL intermittent infusion with 10 mg lidocaine  10 mEq Intravenous Q1H PRN         potassium chloride 10 mEq in 100 mL sterile water intermittent infusion (premix)  10 mEq Intravenous Q1H PRN         potassium chloride 20 mEq in 50 mL intermittent infusion  20 mEq Intravenous Q1H PRN         potassium chloride SA (K-DUR/KLOR-CON M) CR tablet 20 mEq  20 mEq Oral BID   20 mEq at 06/06/18 2243     potassium chloride SA (K-DUR/KLOR-CON M) CR tablet 20-40 mEq  20-40 mEq Oral Q2H PRN         prochlorperazine (COMPAZINE) injection 10 mg  10 mg Intravenous Q6H PRN        Or     prochlorperazine (COMPAZINE) tablet 10 mg  10 mg Oral Q6H PRN        Or     prochlorperazine (COMPAZINE) Suppository 25 mg  25 mg Rectal Q12H PRN          senna-docusate (SENOKOT-S;PERICOLACE) 8.6-50 MG per tablet 1 tablet  1 tablet Oral BID PRN        Or     senna-docusate (SENOKOT-S;PERICOLACE) 8.6-50 MG per tablet 2 tablet  2 tablet Oral BID PRN         sodium chloride (PF) 0.9% PF flush 3 mL  3 mL Intracatheter Q1H PRN         sodium chloride (PF) 0.9% PF flush 3 mL  3 mL Intracatheter Q8H   3 mL at 06/07/18 0105     sulfamethoxazole-trimethoprim (BACTRIM DS/SEPTRA DS) 800-160 MG per tablet 1 tablet  1 tablet Oral BID   1 tablet at 06/06/18 2243     No current [facility]-ordered outpatient prescriptions on file.       Wt Readings from Last 1 Encounters:   06/07/18 124.7 kg (274 lb 14.6 oz)     Temp Readings from Last 1 Encounters:   06/07/18 36.8  C (98.3  F) (Oral)     BP Readings from Last 6 Encounters:   06/07/18 97/53   10/17/13 115/51   10/07/13 112/64   09/30/13 112/62   09/28/13 96/55   09/13/13 101/42     Pulse Readings from Last 4 Encounters:   06/05/18 87   10/14/13 64   10/07/13 68   09/30/13 67     Resp Readings from Last 1 Encounters:   06/07/18 16     SpO2 Readings from Last 1 Encounters:   06/07/18 92%     Recent Labs   Lab Test  06/06/18   0745  06/05/18   1330  10/17/13   0730   10/12/13   0744   NA   --   141   --    --   137   POTASSIUM  3.9  3.9   --    --   4.3   CHLORIDE   --   107   --    --   106   CO2   --   25   --    --   27   ANIONGAP   --   9   --    --   5*   GLC   --   194*   --    --   126*   BUN   --   10   --    --   13   CR   --   0.52  0.71   < >  0.65   DESIREE   --   8.4*   --    --   9.3    < > = values in this interval not displayed.     Recent Labs   Lab Test  10/14/13   0540  09/23/13   0100   AST  70*  42   ALT  55*  33   ALKPHOS  92  87   BILITOTAL  0.5  0.5   LIPASE  38   --      Recent Labs   Lab Test  06/06/18   0745  06/05/18   1330   WBC  2.9*  3.3*   HGB  9.7*  10.7*   PLT  43*  41*     Recent Labs   Lab Test  06/05/18   1330   ABO  O   RH  Pos     Recent Labs   Lab Test  06/06/18   0745  06/05/18   1330  09/06/13   0410    INR  1.17*  1.18*  1.09   PTT   --   35  29      Recent Labs   Lab Test  09/06/13   2210  09/06/13   1330  09/06/13   0410   TROPI  <0.012  <0.012  <0.012     Recent Labs   Lab Test  09/05/13 2010 04/17/12   0355   PH  7.35  7.48*   PCO2  52*  35   PO2  74*  117*   HCO3  29*  26     No results for input(s): HCG in the last 77999 hours.  Recent Results (from the past 744 hour(s))   US Pelvic Complete with Transvaginal    Narrative    ULTRASOUND PELVIC COMPLETE WITH TRANSVAGINAL IMAGING  6/5/2018 2:30 PM      HISTORY:  Postmenopausal bleeding.    TECHNIQUE:  Transvaginal images were performed to better evaluate the  patient's uterus, ovaries and endometrial stripe.    COMPARISON: None.    FINDINGS:  The uterus is measured at 9.1 x 3.7 x 3.2 cm. No fibroids  are evident. Endometrial stripe measures 1.8 cm and appears thickened  and heterogeneous. A few small nabothian cysts are noted within the  cervix. The ovaries are not visualized. No solid adnexal masses are  identified. Small amount of anechoic free pelvic fluid is present. The  exam was somewhat limited related to patient body habitus and  immobility.       Impression    IMPRESSION:   1. The endometrium is heterogeneous and abnormally thickened at 1.8  cm. Endometrial malignancy could have this appearance, and gynecologic  consultation is recommended.  2. Small amount of nonspecific free fluid in the pelvis.  3. Nonvisualization of the ovaries.    ANGELY WALLS MD

## 2018-06-07 NOTE — PLAN OF CARE
Problem: Patient Care Overview  Goal: Plan of Care/Patient Progress Review  Outcome: No Change  A&O x 4. /43 and 97/53. Other VSS on room air. . NPO status maintained after 12 midnight for Bone Marrow Biopsy today. IV SL. Up assist of 1 to bedside commode. Large blackened ulcer to left foot. Right BKA. C/o pain to right lower back. Refused analgesia but accepted application of hot pack to site with some relief. Scant pink PV loss noted on geronimo-pad. Slept intermittently throughout. Will continue to monitor.

## 2018-06-07 NOTE — PLAN OF CARE
Problem: Patient Care Overview  Goal: Plan of Care/Patient Progress Review  Outcome: Improving  Pt orientedx4. Full code. A-1 Pivot to commode.  Vaginal bleeding very minimal after D&C today. R BKA. Left heal large blackened ulcer and open wound on pad of foot. WOC to assess tomorrow. Non blanchable erythema on coccyx. Pt refusing barrier cream and foam dressing. Encouraging repositioning. Pt DULCE for almost 2 hours and discovered outside smoking. Family turned bed alarm off before pt left floor. Education and discussion facilitated. Pt is in denial about consequences of smoking. VSS on RA. WBC low. Oncology consulted today. Bone Marrow biopsy tomorrow. Message left with Pathology for scheduling this tomorrow in AM. NPO at midnight. Miconazole cream to pannus. To get inter-dry in room to utilize here additionally. On chronic sulfa abx per primary care provider. Refusing capnography. Continue to monitor.

## 2018-06-07 NOTE — ANESTHESIA POSTPROCEDURE EVALUATION
Patient: Fani Escobedo    Procedure(s):  bone marrow biopsy    Diagnosis:pancytopenia  Diagnosis Additional Information: No value filed.    Anesthesia Type:  MAC    Note:  Anesthesia Post Evaluation    Patient location during evaluation: Bedside  Patient participation: Able to fully participate in evaluation  Level of consciousness: awake and alert  Pain management: adequate  Airway patency: patent  Cardiovascular status: acceptable  Respiratory status: acceptable  Hydration status: acceptable  PONV: none             Last vitals:  Vitals:    06/07/18 1110 06/07/18 1120 06/07/18 1531   BP: 93/55 113/58 105/49   Pulse:   69   Resp: 14 13 18   Temp:   36.7  C (98  F)   SpO2: 92% 95% 95%         Electronically Signed By: Bereket Ortiz MD  June 7, 2018  5:43 PM

## 2018-06-07 NOTE — PLAN OF CARE
Problem: Patient Care Overview  Goal: Plan of Care/Patient Progress Review  Outcome: Improving  A&Ox4. VSS on RA. Full code. Here d/t pancytopenia/ vaginal bleeding. Platelet count low at 42. Hg was 9.6. Hem/onc and OB/GYN following. Abd. U/S done today. Bone marrow bx done today. Reported back and buttock pain but refused medication. Up w/ 1 assist and pivot to BSC. Rt BKA. WOC nurse in to dress left foot, dressing C/D/I. BG check was 232. Probable D/C home tomorrow. Fall precautions in place. Will cont to monitor.

## 2018-06-07 NOTE — PROGRESS NOTES
[facility]    Hospitalist Progress Note    Assessment & Plan   Fani Escobedo is a 63 year old female with PMHx of DM II, COPD (not O2 dependent), USHA, hypothyroidism, anemia and chronic pain for which she is managed with chronic narcotics who was admitted on 6/5/2018 for evaluation of vaginal bleeding and pancytopenia.     Vaginal bleeding, s/p D&C on 6/6/18:   Endorsed hx of intermittent vaginal bleeding over the past two years. Had been recently evaluated by OB/Gyn while living in [place]. Attempted biopsy was unsuccessful due to pain and second attempt was deferred due to her thrombocytopenia. Has now returned to [place] for the summer and endorsed constant bleeding with passage of clots over the past 7-10d with associated lightheadedness and fatigue. Hgb 10.7 on admission. Pelvic US on presentation showed 1.8cm endometrial thickening. Evaluated by gyn this stay -- underwent D&C per Dr. Ortega on 6/6. Prior to procedure she was transfused platelets and given Vitamin K 5mg IV x1 as INR 1.18. Given an additional unit of platelets on 6/6 AM prior to procedure.  -- hgb stable at 9-10  -- ongoing cares per gyn  -- endometrial curettings sent to pathology for review, results still pending    Pancytopenia:    Per review of records available in Care Everywhere, she has hx of intermittently low cell counts in the past. Had been previously advised to follow up with a hematologist for further workup but she never did. On presentation this stay, WBC 3.3 with ANC 1700, hgb 10.7, platelets 41. Borderline macrocytosis with MCV 99. It is thought that her anemia and thrombocytopenia may be due in part of her recent bleeding but there was mention that her counts were low prior to this. Additional workup pursued this stay. Iron studies mostly unremarkable (Fe and ferritin nl, IBC and transferrin mildly low), B12 and folate were nl. Peripheral smear showed moderate normochromic normocytic anemia with increased retic count  (c/w blood loss), moderate leukopenia with normal morphology and differentia and marked thrombocytopenia with broad ddx.   -- platelet counts and hgb remain low but stable this stay (hgb 9-10, platelets 40s)  -- ?if findings possibly related to myelosuppression from long term Bactrim use  -- heme/onc consulted for further evaluation -- underwent bone marrow biopsy on 6/7/18    COPD:   Chronic and stable on home inhalers.     DM II with neuropathy:   A1C 7.3 this stay.   Home meds: Metformin 1000mg BID, Levemir 42U BID, Novolog 28U w/breakfast, 30U w/lunch, 28U w/dinner and gabapentin 1200mg TID  -- Levemir and mealtime Novolog decreased on admission given NPO status for surgery    Recent Labs  Lab 06/07/18  0852 06/07/18  0359 06/07/18  0231 06/06/18  2223 06/06/18  1608 06/06/18  1234  06/05/18  1330   GLC  --   --   --   --   --   --   --  194*   * 296* 327* 374* 369* 210*  < >  --    < > = values in this interval not displayed.     -- cont home doses of Levemir and mealtime Novolog as well as med dose SSI  -- anticipate resumption of Metformin at discharge    Fatty Liver:  Noted on serial abdominal ultrasounds (in 2013 and again this stay).  -- no new recommendations at this time, should follow up with PCP     Obstructive sleep apnea:   Does not use CPAP.     Hypothyroidism:   TSH nl this stay. Conts on Synthroid     Chronic left heel ulcer:   Presure Ulcer on Left Buttock  Heel wound followed by wound clinic.   Wound RN consulted this stay.     Chronic pain on chronic narcotics:   Chronic and stable on gabapentin and dilaudid (uses 4mg po q4h prn).  No changes to pain meds made thi stay.    Hx of Diskitis/Osteomyelitis of the Spine:  On chronic suppressive therapy with Bactrim which has been continued this stay.     Pain Assessment:  Current Pain Score 6/7/2018   Patient currently in pain? yes   Pain score (0-10) -   Pain location Back   Pain descriptors Aching   Has issues with chronic pain as above.  Continues on oral dilaudid (4mg po q4h prn) as per prior to admission.       FEN: no IVFs, lytes stable, mod CHO diet  DVT Prophylaxis: PCDs  Code Status: Full Code    Disposition: Anticipate discharge home tomorrow -- pending stable labs and recommendations per subspecialists.     Sola Vizcarra    Interval History   Seen this afternoon after bone marrow biopsy. No specific complaints -- endorses ongoing chronic pain but no new concerns. No cp/sob. No abd pain/n/v. Vaginal bleeding has significantly improved following D&C yesterday.     -Data reviewed today: I reviewed all new labs and imaging results over the last 24 hours. I personally reviewed no images or EKG's today.    Physical Exam   Temp: 98.2  F (36.8  C) Temp src: Oral BP: 121/56   Heart Rate: 68 Resp: 16 SpO2: 96 % O2 Device: None (Room air)    Vitals:    06/05/18 1724 06/06/18 0628 06/07/18 0621   Weight: 123 kg (271 lb 2.7 oz) 123.4 kg (272 lb 0.8 oz) 124.7 kg (274 lb 14.6 oz)     Vital Signs with Ranges  Temp:  [97  F (36.1  C)-99.1  F (37.3  C)] 98.2  F (36.8  C)  Heart Rate:  [] 68  Resp:  [9-26] 16  BP: ()/(43-73) 121/56  SpO2:  [90 %-99 %] 96 %  I/O last 3 completed shifts:  In: 2249 [P.O.:960; I.V.:1000]  Out: 1600 [Urine:1600]    Constitutional: Resting comfortably, alert and conversing appropriately, NAD  Respiratory: CTAB, no wheeze/rales/rhonchi, no increased work of breathing  Cardiovascular: HRRR, no MGR, trace LLE edema  GI: obese, S, ND, NT, +BS  Skin/Integumen: warm/dry  Other: +R BKA    Medications       gabapentin (NEURONTIN) tablet 1,200 mg  1,200 mg Oral TID     insulin aspart  28 Units Subcutaneous QAM AC     insulin aspart  30 Units Subcutaneous Daily with lunch     insulin aspart  28 Units Subcutaneous Daily with supper     insulin aspart  1-7 Units Subcutaneous TID AC     insulin aspart  1-5 Units Subcutaneous At Bedtime     insulin detemir  42 Units Subcutaneous BID     levothyroxine (SYNTHROID/LEVOTHROID)  tablet 50 mcg  50 mcg Oral QPM     miconazole   Topical BID     potassium chloride  20 mEq Oral BID     sodium chloride (PF)  3 mL Intracatheter Q8H     sulfamethoxazole-trimethoprim  1 tablet Oral BID       Data     Recent Labs  Lab 06/06/18  0745 06/05/18  1330   WBC 2.9* 3.3*   HGB 9.7* 10.7*   MCV 99 99   PLT 43* 41*   INR 1.17* 1.18*   NA  --  141   POTASSIUM 3.9 3.9   CHLORIDE  --  107   CO2  --  25   BUN  --  10   CR  --  0.52   ANIONGAP  --  9   DESIREE  --  8.4*   GLC  --  194*       Recent Results (from the past 24 hour(s))   US Abdomen Limited    Narrative    RIGHT UPPER QUADRANT ULTRASOUND 6/7/2018 8:40 AM    HISTORY:  elevated LFT;     COMPARISON: Ultrasound dated 10/14/2013    FINDINGS:    Gallbladder: There are gallstones. The gallbladder wall is not  thickened.    Bile ducts:   CHD is normal diameter.  No intrahepatic biliary  dilatation.    Liver:  The liver has a coarse echotexture suggesting fatty  infiltration. Slight nodular contour. The liver is enlarged measuring  21 cm in length.    Pancreas:  Visualized portions of the pancreas are unremarkable.    Right kidney:  Unremarkable      Impression    IMPRESSION:    1. Gallstones.  2. Coarse echotexture of the liver suggesting diffuse fatty  infiltration. Hepatomegaly.    JS GARCIA MD

## 2018-06-07 NOTE — DISCHARGE INSTRUCTIONS
Lt plantar foot wounds  1. F/U with [facility] as scheduled  2. Continue tx plan as outline by [facility]  3. Pt has supplies for discharge  4. Breast folds: pt has Interdry sheet for discharge  5. Coccyx: barrier ointment (ex; Desitin).  Reposition frequently

## 2018-06-07 NOTE — PROGRESS NOTES
Service Date: 2018      SUBJECTIVE:  Ms. Escobedo is a 63-year-old female who is being followed for pancytopenia.  Patient has chronic cytopenia for many years. Patient has had multiple workups done.  Vitamin B12, folate and iron are all normal.  SPEP is pending.  Bone marrow biopsy was done today.        Overall, her condition is stable.  She was admitted with vaginal bleeding.  She had a D and C done.  She is not having any significant vaginal bleeding.      LABORATORY DATA:  Reviewed.      Ultrasound abdomen reveals diffuse fatty liver.      ASSESSMENT:   1. A 63-year-old female with pancytopenia.   2. Fatty liver.   3. Vaginal bleeding, status post D and C.      PLAN:   1.  CBC was reviewed.  Her pancytopenia is overall stable. Patient had bone marrow biopsy done.  Results will be available in next 1 week.  We will see her in the clinic.   2.  Ultrasound reviewed.  I told her that she has fatty liver.  Advised her to diet, exercise and lose weight.   3.  She had a few questions, which were all answered.  We will sign off.  We will see her in clinic in 1-2 weeks for follow-up.         NORMAN JONES MD             D: 2018   T: 2018   MT: CONNOR      Name:     BELKYS ESCOBEDO   MRN:      [id number]        Account:      [id number]   :      1955           Service Date: 2018      Document: [id number]

## 2018-06-07 NOTE — PROGRESS NOTES
[facility] Nurse Inpatient Wound Assessment      Assessment of wound(s) on pt's:  Lt  Diabetic plantar foot wound                                                                     Chest wall and coccyx - all Present of admission        Data:   Patient History:     Fani Escobedo is a 63 year old female with PMHx of DM II, COPD (not O2 dependent), USHA, hypothyroidism, anemia and chronic pain for which she is managed with chronic narcotics who was admitted on 2018 for evaluation of vaginal bleeding and pancytopenia.        Cali Assessment and sub scores:   Cali Score  Av.7  Min: 16  Max: 19    Positioning: per self    Mattress: atmos air      Moisture Management:  [facility]      Current Diet / Nutrition:     Active Diet Order      Moderate Consistent CHO Diet      Labs:   Recent Labs   Lab Test  18   1028  18   0745  18   1330   10/14/13   0540  10/12/13   0744   ALBUMIN   --    --    --    --   3.5   --    HGB  9.6*  9.7*  10.7*   < >  10.0*   --    INR   --   1.17*  1.18*   --    --    --    WBC  3.9*  2.9*  3.3*   < >  4.5   --    A1C   --    --   7.3*   --    --    --    CRP   --    --    --    --    --   27.5*    < > = values in this interval not displayed.       Wound Assessment (location):    Lt planar foot wounds  Wound History:  Per patient she is followed @ [facility].  Current tx protocol to Lt plantar foot wound is Medihoney daily. Pt has an offloading boot.  Per Pt Lt plantar heel wound stared following car trip.  Future tx plan to included casting to completely off-load pressure on plantar surface Lt foot. Rt BKA noted.     Wound Base:    Specific Dimensions (length x width x depth, in cm) :            1. Lt plantar heel wound:  4.0cm x 4.0cm with 90% firm dry eschar base with scoring noted / 10%              Softening of wound margins/eshar with red base. Michelle-wound skin intact with light erythema              Noted along superior margin of wound.           2. Lt posterior 2nd toe:  1.5cm x .3cm x .1cm with 100% pink base, geronimo-wound skin with thick,dry                Callous build-up    Tunneling:  Unable to determine    Undermining: Unable to determineTUNNEL & UNDERMININ}    Palpation of the wound bed:  Firm to hard callous/eschar    Slough appearance:  none      Temperature: Normal    Drainage: none    Odor: none    Pain:  Denies    Coccyx and chest wall wounds noted on admission  Pt refused assessment. Pt states coccyx is r/t to moisture and increase in sitting r/t vaginal bleeding.    Chest wall is candida R/t to breast lying on abd wall.           Intervention:     Patient's chart evaluated.      Wound(s)  Assessed on Lt plantar foot. Pt refused assessment coccyx wound, noted on admission    Wound Care: Lt plantar foot wound assessed and wound care performed    Orders  In [facility]    Supplies  Int pt room    Discussed plan of care with Patient and              All patient / family questions answered yes          Assessment:        Lt plantar foot/heel wounds:  Diabetic foot wounds, no local s/s infecton        Plan:     Nursing to notify the Provider(s) and re-consult the WO Nurse if wound(s) deteriorate(s) or if the wound care plan needs reevaluation.    Wound care:          1. Lt plantar foot: daily            -Clean with MicroKlenz            -Pat skin dry            -Apply medihoney to wound beds            -Cover heel wound with Optifoam             -Cover Lt plantar toe wound with polymen            -Secure all with Kerlix            -Cover with sock            -pt should wear shoe @ all time when standing for transfer     2. Coccyx: change on odd days and prn: mepilex sacral or mepilex border     3.  Breast crease candida: have pt place interdry prn     AVS completed    WOC Nurse will return: Weekly and prn

## 2018-06-07 NOTE — PROGRESS NOTES
Pt found outside smoking by security. To return to floor Promptly. Education and reinforcement on smoking restrictions in the hospital and health risks to be completed by writer.

## 2018-06-08 VITALS
OXYGEN SATURATION: 94 % | RESPIRATION RATE: 16 BRPM | SYSTOLIC BLOOD PRESSURE: 111 MMHG | HEIGHT: 71 IN | TEMPERATURE: 98.6 F | WEIGHT: 274.91 LBS | HEART RATE: 69 BPM | DIASTOLIC BLOOD PRESSURE: 49 MMHG | BODY MASS INDEX: 38.49 KG/M2

## 2018-06-08 LAB
ALBUMIN SERPL ELPH-MCNC: 3.2 G/DL (ref 3.7–5.1)
ALPHA1 GLOB SERPL ELPH-MCNC: 0.3 G/DL (ref 0.2–0.4)
ALPHA2 GLOB SERPL ELPH-MCNC: 0.6 G/DL (ref 0.5–0.9)
B-GLOBULIN SERPL ELPH-MCNC: 0.7 G/DL (ref 0.6–1)
COPATH REPORT: NORMAL
ERYTHROCYTE [DISTWIDTH] IN BLOOD BY AUTOMATED COUNT: 14.9 % (ref 10–15)
GAMMA GLOB SERPL ELPH-MCNC: 1.1 G/DL (ref 0.7–1.6)
GLUCOSE BLDC GLUCOMTR-MCNC: 122 MG/DL (ref 70–99)
GLUCOSE BLDC GLUCOMTR-MCNC: 204 MG/DL (ref 70–99)
HCT VFR BLD AUTO: 30.3 % (ref 35–47)
HGB BLD-MCNC: 9.7 G/DL (ref 11.7–15.7)
IGA SERPL-MCNC: 265 MG/DL (ref 70–380)
IGG SERPL-MCNC: 1020 MG/DL (ref 695–1620)
IGM SERPL-MCNC: 75 MG/DL (ref 60–265)
M PROTEIN SERPL ELPH-MCNC: 0 G/DL
MCH RBC QN AUTO: 33.1 PG (ref 26.5–33)
MCHC RBC AUTO-ENTMCNC: 32 G/DL (ref 31.5–36.5)
MCV RBC AUTO: 103 FL (ref 78–100)
PLATELET # BLD AUTO: 53 10E9/L (ref 150–450)
PROT PATTERN SERPL ELPH-IMP: ABNORMAL
PROT PATTERN SERPL IFE-IMP: NORMAL
RBC # BLD AUTO: 2.93 10E12/L (ref 3.8–5.2)
WBC # BLD AUTO: 3.1 10E9/L (ref 4–11)

## 2018-06-08 PROCEDURE — 36415 COLL VENOUS BLD VENIPUNCTURE: CPT | Performed by: INTERNAL MEDICINE

## 2018-06-08 PROCEDURE — 99238 HOSP IP/OBS DSCHRG MGMT 30/<: CPT | Performed by: INTERNAL MEDICINE

## 2018-06-08 PROCEDURE — 25000131 ZZH RX MED GY IP 250 OP 636 PS 637: Performed by: INTERNAL MEDICINE

## 2018-06-08 PROCEDURE — 25000132 ZZH RX MED GY IP 250 OP 250 PS 637: Performed by: HOSPITALIST

## 2018-06-08 PROCEDURE — 85027 COMPLETE CBC AUTOMATED: CPT | Performed by: INTERNAL MEDICINE

## 2018-06-08 PROCEDURE — 00000146 ZZHCL STATISTIC GLUCOSE BY METER IP

## 2018-06-08 PROCEDURE — 25000132 ZZH RX MED GY IP 250 OP 250 PS 637: Performed by: INTERNAL MEDICINE

## 2018-06-08 RX ADMIN — MICONAZOLE NITRATE: 20 CREAM TOPICAL at 08:29

## 2018-06-08 RX ADMIN — GABAPENTIN 1200 MG: 600 TABLET, FILM COATED ORAL at 08:27

## 2018-06-08 RX ADMIN — POTASSIUM CHLORIDE 20 MEQ: 1500 TABLET, EXTENDED RELEASE ORAL at 08:27

## 2018-06-08 RX ADMIN — SULFAMETHOXAZOLE AND TRIMETHOPRIM 1 TABLET: 800; 160 TABLET ORAL at 08:27

## 2018-06-08 RX ADMIN — INSULIN DETEMIR 42 UNITS: 100 INJECTION, SOLUTION SUBCUTANEOUS at 08:26

## 2018-06-08 NOTE — PLAN OF CARE
Problem: Patient Care Overview  Goal: Plan of Care/Patient Progress Review  Outcome: No Change  A&Ox4, VSS on RA. Reports of pain to back and buttocks, declines meds, frequent weight shifting/repositioning encouraged. Up A1/pivot to commode and w/c. Falls precautions in place, declines bed alarm, pt and spouse turn alarm off when activated by staff. Education on risks vs benefits, both verbalize understanding. Pt also continues to leave unit with spouse against staff advise. No active vaginal bleeding noted this shift. Mod CHO diet, excellent intake. Abd US shows gallstones and fatty liver. Bone marrow biopsy to result in 1week. Plans to follow up outpatient with Hem/Onc in 1-2 weeks. Possible D/C home tomorrow.

## 2018-06-08 NOTE — PLAN OF CARE
Problem: Patient Care Overview  Goal: Plan of Care/Patient Progress Review  Outcome: No Change  A&O x 4. VSS on room air. . IV SL. Right BKA. Dressing to left heel CDI. C/o pain to right lower back. Refused analgesia. Reported very scant PV loss. Refused Mapilex to coccyx. Slept well throughout. Possible for discharge today. Will continue to monitor.

## 2018-06-08 NOTE — PLAN OF CARE
Problem: Patient Care Overview  Goal: Plan of Care/Patient Progress Review  Outcome: Adequate for Discharge Date Met: 06/08/18  Patient alert/orient X4, up with sba/belt.  Lungs clear, on RA.  LLE dressing changed with small amount of drainage/serosang.  Vaginal bleeding, only has had 1 pad in since yesterday.  Tolerating diet, /.  Discharging to home today.  Vss, has constant back pain but declined any interventions.

## 2018-06-08 NOTE — PROGRESS NOTES
Care Transition Initial Assessment - RN  Met with: Patient.  DATA   Principal Problem:    Postmenopausal vaginal bleeding  Active Problems:    Anemia    Thrombocytopenia (H)    Abnormal finding on ultrasound       Cognitive Status: alert and oriented.        Contact information and PCP information verified: Yes  Lives With: spouse                    Insurance concerns: No Insurance issues identified  ASSESSMENT  Patient currently receives the following services:  None    Identified issues/concerns regarding health management:  Lives with spouse,has RBKA,is w/c bound, discussed discharge plan,patient noted to be noncompliant with follow ups and medical care,states she is diabetic uses insulin,does not monitor glucoses,voiced concerns regarding elevated  blood sugars after discharge from hospital.Discussed dietary conformant, monitoring glucoses and endocrinology follow up. Patient also has a blister L ankle for which she follows with wound clinic at [facility].Patient was admitted with vaginal bleeding and noted to have low platelets. Seen by hematologist and follow up recommended in 1-2 weeks,Primary care  appointment made.  Patient would benefit from Home RN visit and was offered services but patient declines it stating she is not home bound as she will have to care for her grand children.Patient discharging today  PLAN  Financial costs for the patient include No.  Patient given options and choices for discharge yes .  Patient/family is agreeable to the plan?  Yes:   Patient anticipates discharging to home .        Patient anticipates needs for home equipment: Yes  Plan/Disposition: Home   Appointments: 6/19 at 11:20 with Marielos Nicholson      Care  (CTS) will continue to follow as needed.

## 2018-06-08 NOTE — PROGRESS NOTES
Discharge    Patient discharged to home via wheelchair with family  Care plan note: Patient was discharged in her w/c, pt was brought down to door #6 for discharge, patient's family were not there & patient wanted to stay waiting, they were going to be about 1/2 hour.  The family did come to pick her up.    Listed belongings gathered and returned to patient. Yes  Care Plan and Patient education resolved: Yes  Prescriptions if needed, hard copies sent with patient  NA  Home and hospital acquired medications returned to patient: NA  Medication Bin checked and emptied on discharge Yes  Follow up appointment made for patient:Yes

## 2018-06-08 NOTE — PROGRESS NOTES
GYN  Pathology report is not in yet. Minimal vaginal bleeding.  If report not back today, will call pt at home.  Should be back Monday if not later today.    Electronically signed by:  Tip Ortega MD   June 8, 2018 7:15 AM  [facility]

## 2018-06-08 NOTE — DISCHARGE SUMMARY
[facility]    Discharge Summary  Hospitalist    Date of Admission:  6/5/2018  Date of Discharge:  6/8/2018  Discharging Provider: Sola Vizcarra    Discharge Diagnoses   Vaginal bleeding, s/p D&C on 6/6/18  Pancytopenia, s/p bone marrow biopsy on 6/7/18, likely with component of acute blood loss anemia  COPD  DM II with neuropathy  Fatty Liver  Obstructive sleep apnea  Hypothyroidism  Chronic left heel ulcer:   Presure Ulcer on Left Buttock  Chronic pain on chronic narcotics:  Hx of Diskitis/Osteomyelitis of the Spine    History of Present Illness   Fani Escobedo is a 63 year old female with PMHx of DM II, COPD (not O2 dependent), USHA, hypothyroidism, anemia and chronic pain for which she is managed with chronic narcotics who was admitted on 6/5/2018 for evaluation of vaginal bleeding and pancytopenia.     Hospital Course   Fani Escobedo was admitted on 6/5/2018.  The following problems were addressed during her hospitalization:     Vaginal bleeding, s/p D&C on 6/6/18:   Endorsed hx of intermittent vaginal bleeding over the past two years. Had been recently evaluated by OB/Gyn while living in [place]. Attempted biopsy was unsuccessful due to pain and second attempt was deferred due to her thrombocytopenia. Has now returned to [place] for the summer and endorsed constant bleeding with passage of clots over the past 7-10d with associated lightheadedness and fatigue. Hgb 10.7 on admission. Pelvic US on presentation showed 1.8cm endometrial thickening. Evaluated by gyn this stay -- underwent D&C per Dr. Ortega on 6/6. Prior to procedure she was transfused platelets and given Vitamin K 5mg IV x1 as INR 1.18. Given an additional unit of platelets on 6/6 AM prior to procedure. Tolerated D&C well. No post procedure complications. Bleeding much improved. Endometrial curettings sent to pathology for review, results still pending at the time of discharge. Dr. Ortega of OB/Gyn will follow up with patient. Hgb  remained stable at 9-10 this stay.     Pancytopenia, s/p bone marrow biopsy on 6/7/18, likely with component of acute blood loss anemia dt above:    Per review of records available in Care Everywhere, she has hx of intermittently low cell counts in the past. Had been previously advised to follow up with a hematologist for further workup but she never did. On presentation this stay, WBC 3.3 with ANC 1700, hgb 10.7, platelets 41. Borderline macrocytosis with MCV 99. It is thought that her anemia and thrombocytopenia may be due in part of her recent bleeding but there was mention that her counts were low prior to this. Additional workup pursued this stay. Iron studies mostly unremarkable (Fe and ferritin nl, IBC and transferrin mildly low), B12 and folate were nl. Peripheral smear showed moderate normochromic normocytic anemia with increased retic count (c/w blood loss), moderate leukopenia with normal morphology and differentia and marked thrombocytopenia with broad ddx. ?if findings possibly related to myelosuppression from long term Bactrim use. Heme/onc consulted for further evaluation -- underwent bone marrow biopsy on 6/7/18. Will follow up with Dr. Montana in clinic to review results and discuss need for further testing/treatment. Counts remained low but relatively stable during stay (hgb 9-10, platelets 40s).    COPD:   Chronic and stable on home inhalers.      DM II with neuropathy:   A1C 7.3 this stay.   Home meds: Metformin 1000mg BID, Levemir 42U BID, Novolog 28U w/breakfast, 30U w/lunch, 28U w/dinner and gabapentin 1200mg TID  Insulin doses initially decreased while NPO for D&C, ultimately resumed at home doses.   No changes to meds made at discharge.      Fatty Liver:  Noted on serial abdominal ultrasounds (both in 2013 and again this stay).  No new recommendations at this time, should follow up with PCP      Obstructive sleep apnea:   Does not use CPAP.      Hypothyroidism:   TSH nl this stay. Conts on  Synthroid      Chronic left heel ulcer:   Presure Ulcer on Left Buttock  Heel wound followed by wound clinic.   Wound RN consulted this stay.      Chronic pain on chronic narcotics:   Chronic and stable on gabapentin and dilaudid (uses 4mg po q4h prn).  No changes to pain meds made thi stay.     Hx of Diskitis/Osteomyelitis of the Spine:  On chronic suppressive therapy with Bactrim which has been continued this stay.     Discharge Pain Plan:   Discharged with plans to continue oral dilaudid as needed as per prior to admission, no new prescriptions ordered at discharge.    Sola Vizcarra    Significant Results and Procedures   1. Date of Procedure: 6/6/18      Procedure: Dilation and Curettage of Uterus    Preoperative Diagnosis: Postmenopausal Vaginal Bleeding, Abnormal Finding on Ultrasound  Postoperative Diagnosis: Same and Pressure Ulcer Left Buttock    Surgeon:  Tip Ortega MD      2. Bone Marrow Biopsy on 6/7/18.  Results pending at the time of discharge. Will follow up in clinic to review results.    Pending Results   These results will be followed up by Dr. Montana / heme/onc clinic  Unresulted Labs Ordered in the Past 30 Days of this Admission     Date and Time Order Name Status Description    6/8/2018 0824 Process and hold DNA In process     6/7/2018 1036 Leukemia Lymphoma Evaluation (Flow Cytometry) In process     6/7/2018 0001 Protein Immunofixation Serum In process     6/7/2018 0001 Protein electrophoresis In process     6/6/2018 1628 CHROMOSOME BONE MARROW With Professional Interpretation In process     6/6/2018 1628 Bone marrow biopsy In process     6/5/2018 1718 Platelets prepare order unit In process           Code Status   Full Code       Primary Care Physician   [facility]    Physical Exam   Temp: 98.6  F (37  C) Temp src: Oral BP: 111/49 Pulse: 69 Heart Rate: 66 Resp: 16 SpO2: 94 % O2 Device: None (Room air)    Vitals:    06/05/18 1724 06/06/18 0628 06/07/18 0621   Weight:  123 kg (271 lb 2.7 oz) 123.4 kg (272 lb 0.8 oz) 124.7 kg (274 lb 14.6 oz)     Vital Signs with Ranges  Temp:  [98  F (36.7  C)-98.8  F (37.1  C)] 98.6  F (37  C)  Pulse:  [69] 69  Heart Rate:  [60-67] 66  Resp:  [13-18] 16  BP: ()/(41-58) 111/49  SpO2:  [92 %-95 %] 94 %  I/O last 3 completed shifts:  In: 540 [P.O.:240; I.V.:300]  Out: -     General: Resting comfortably, alert, conversive, NAD  CVS: HRRR, no MGR, trace LLE edema, +R BKA  Respiratory: CTAB, no wheeze/rales/rhonchi, no increased work of breathing  GI: obese, S, NT, ND, +BS  Skin: Warm/dry    Discharge Disposition   Discharged to home  Condition at discharge: Stable    Consultations This Hospital Stay   HEMATOLOGY & ONCOLOGY IP CONSULT  OB GYN IP CONSULT  WOUND OSTOMY CONTINENCE NURSE  IP CONSULT    Time Spent on this Encounter   I, Sola Vizcarra, personally saw the patient today and spent less than 30 minutes discharging this patient.    Discharge Orders     Reason for your hospital stay   Evaluation of your vaginal bleeding and low blood counts, which required additional procedures (D&C, bone marrow biopsy).     Follow-up and recommended labs and tests    1. Follow up with your PCP in the next 5-7 days with repeat CBC.  2. Follow up with [facility]c in clinic as advised to review the results of your bone marrow biopsy.  3. Follow up with Dr. Ortega in OB/Gyn clinic as needed.     Activity   Your activity upon discharge: activity as tolerated     Diet   Follow this diet upon discharge: Moderate consistent carbohydrate (8004-4754 queenie / 4-6 CHO units per meal)       Discharge Medications   Current Discharge Medication List      CONTINUE these medications which have NOT CHANGED    Details   ASPIRIN ADULT LOW STRENGTH PO Take 81 mg by mouth twice a week       B Complex Vitamins (VITAMIN  B COMPLEX) tablet Take 1 tablet by mouth daily.      calcium carb 1250 mg, 500 mg [place],/vitamin D 200 units (OSCAL WITH D) 500-200 MG-UNIT per tablet  Take 1 tablet by mouth 3 times daily (with meals)  Qty: 90 tablet    Associated Diagnoses: Wound disruption, post-op, skin, initial encounter      ferrous sulfate 325 (65 FE) MG tablet Take 1 tablet by mouth 3 times daily (with meals)       GABAPENTIN PO Take 1,200 mg by mouth 3 times daily 2 x 600 mg tab      !! Insulin Aspart (NOVOLOG SC) Inject 28 Units Subcutaneous daily (with breakfast)      !! Insulin Aspart (NOVOLOG SC) Inject 30 Units Subcutaneous daily (with lunch)      !! Insulin Aspart (NOVOLOG SC) Inject 28 Units Subcutaneous daily (with dinner)      insulin detemir (LEVEMIR FLEXPEN/FLEXTOUCH) 100 UNIT/ML injection Inject 42 Units Subcutaneous 2 times daily      Lactobacillus (ACIDOPHILUS PO) Take 1 capsule by mouth daily      LEVOTHYROXINE SODIUM PO Take 50 mcg by mouth daily       METFORMIN HCL PO Take 1,000 mg by mouth 2 times daily (with meals)      !! potassium chloride (KLOR-CON) 8 MEQ CR tablet Take 8 mEq by mouth daily in the afternoon      !! potassium chloride (KLOR-CON) 8 MEQ CR tablet Take 16 mEq by mouth 2 times daily AM and PM in addition to afternoon dose      sulfamethoxazole-trimethoprim (BACTRIM DS/SEPTRA DS) 800-160 MG per tablet Take 1 tablet by mouth 2 times daily      HYDROmorphone (DILAUDID) 4 MG tablet Take 1 tablet (4 mg) by mouth every 4 hours as needed  Qty: 90 tablet, Refills: 0    Associated Diagnoses: Discitis       !! - Potential duplicate medications found. Please discuss with provider.        Allergies   Allergies   Allergen Reactions     Adhesive Tape      Cephalosporins Anaphylaxis     Naproxen Anaphylaxis     Penicillins      Augmentin Rash     Benadryl [Anti-Itch] Rash     Morphine Hcl Itching and Rash     Data   Most Recent 3 CBC's:  Recent Labs   Lab Test  06/08/18   0810  06/07/18   1028  06/06/18   0745   WBC  3.1*  3.9*  2.9*   HGB  9.7*  9.6*  9.7*   MCV  103*  100  99   PLT  53*  42*  43*      Most Recent 3 BMP's:  Recent Labs   Lab Test  06/07/18   1028   06/06/18   0745  06/05/18   1330  10/17/13   0730   10/12/13   0744   NA  142   --   141   --    --   137   POTASSIUM  4.1  3.9  3.9   --    --   4.3   CHLORIDE  107   --   107   --    --   106   CO2  26   --   25   --    --   27   BUN  10   --   10   --    --   13   CR  0.48*   --   0.52  0.71   < >  0.65   ANIONGAP  9   --   9   --    --   5*   DESIREE  8.7   --   8.4*   --    --   9.3   GLC  206*   --   194*   --    --   126*    < > = values in this interval not displayed.     Most Recent INR's and Anticoagulation Dosing History:  Anticoagulation Dose History     Recent Dosing and Labs Latest Ref Rng & Units 4/27/2011 4/16/2012 4/28/2013 9/5/2013 9/6/2013 6/5/2018 6/6/2018    INR 0.86 - 1.14 1.04 1.43(H) 1.20(H) 1.11 1.09 1.18(H) 1.17(H)        Most Recent TSH, T4 and A1c Labs:  Recent Labs   Lab Test  06/05/18   1330   TSH  1.24   A1C  7.3*     Results for orders placed or performed during the hospital encounter of 06/05/18   US Pelvic Complete with Transvaginal    Narrative    ULTRASOUND PELVIC COMPLETE WITH TRANSVAGINAL IMAGING  6/5/2018 2:30 PM      HISTORY:  Postmenopausal bleeding.    TECHNIQUE:  Transvaginal images were performed to better evaluate the  patient's uterus, ovaries and endometrial stripe.    COMPARISON: None.    FINDINGS:  The uterus is measured at 9.1 x 3.7 x 3.2 cm. No fibroids  are evident. Endometrial stripe measures 1.8 cm and appears thickened  and heterogeneous. A few small nabothian cysts are noted within the  cervix. The ovaries are not visualized. No solid adnexal masses are  identified. Small amount of anechoic free pelvic fluid is present. The  exam was somewhat limited related to patient body habitus and  immobility.       Impression    IMPRESSION:   1. The endometrium is heterogeneous and abnormally thickened at 1.8  cm. Endometrial malignancy could have this appearance, and gynecologic  consultation is recommended.  2. Small amount of nonspecific free fluid in the pelvis.  3.  Nonvisualization of the ovaries.    ANGELY WALLS MD   US Abdomen Limited    Narrative    RIGHT UPPER QUADRANT ULTRASOUND 6/7/2018 8:40 AM    HISTORY:  elevated LFT;     COMPARISON: Ultrasound dated 10/14/2013    FINDINGS:    Gallbladder: There are gallstones. The gallbladder wall is not  thickened.    Bile ducts:   CHD is normal diameter.  No intrahepatic biliary  dilatation.    Liver:  The liver has a coarse echotexture suggesting fatty  infiltration. Slight nodular contour. The liver is enlarged measuring  21 cm in length.    Pancreas:  Visualized portions of the pancreas are unremarkable.    Right kidney:  Unremarkable      Impression    IMPRESSION:    1. Gallstones.  2. Coarse echotexture of the liver suggesting diffuse fatty  infiltration. Hepatomegaly.    JS GARCIA MD

## 2018-06-08 NOTE — PROCEDURES
The patient was positively identified and informed consent was obtained (see the completed Affirmation of Consent for Bone Marrow Aspiration and/or Biopsy Procedure(s) form in the patient's chart). The patient was placed in the supine position and the bony landmarks of the pelvis were identified. Medical staff reconfirmed the patient's name, date of birth and procedure. The skin over the posterior iliac crest was scrubbed and draped in a sterile fashion. The local area of the procedure was anesthetized with a total of 10 mL of 1% Lidocaine and a small incision was made.  The patient did receive conscious sedation.    Trephine bone marrow core(s) was/were obtained from the left side. Bone marrow aspirate was obtained from the left side for immunophenotyping and additional tests as needed    Direct pressure was applied to the biopsy site with sterile gauze. The biopsy site was cleaned with alcohol and a sterile dressing was placed over the biopsy incision using a pressure bandage. The patient was then placed in the supine position to maintain pressure on the biopsy site. Post-procedure wound care instructions, including routine dressing instructions and analgesia, were given to the patient. The procedure was completed without complication.

## 2018-06-11 LAB — COPATH REPORT: NORMAL

## 2018-06-12 ENCOUNTER — MEDICAL CORRESPONDENCE (OUTPATIENT)
Dept: HEALTH INFORMATION MANAGEMENT | Facility: CLINIC | Age: 63
End: 2018-06-12

## 2018-06-14 NOTE — TELEPHONE ENCOUNTER
1.  Date of consult: 7/9/18    2.  Reason for consult: Endometrial CA    3.  Referring provider/facility: Tip Ortega / [facility]    4. Scheduled by: [facility] (clinic)      5.  Outside records requested from: [facility]    6.  Additional Information: Ultrasound imaging from 6/5 & 6/7 received & is attached/viewable

## 2018-06-19 ENCOUNTER — ONCOLOGY VISIT (OUTPATIENT)
Dept: ONCOLOGY | Facility: CLINIC | Age: 63
End: 2018-06-19
Attending: INTERNAL MEDICINE
Payer: COMMERCIAL

## 2018-06-19 ENCOUNTER — TRANSFERRED RECORDS (OUTPATIENT)
Dept: HEALTH INFORMATION MANAGEMENT | Facility: CLINIC | Age: 63
End: 2018-06-19

## 2018-06-19 ENCOUNTER — HOSPITAL ENCOUNTER (OUTPATIENT)
Facility: CLINIC | Age: 63
Setting detail: SPECIMEN
Discharge: HOME | End: 2018-06-19
Attending: INTERNAL MEDICINE | Admitting: INTERNAL MEDICINE
Payer: COMMERCIAL

## 2018-06-19 VITALS
RESPIRATION RATE: 18 BRPM | DIASTOLIC BLOOD PRESSURE: 74 MMHG | OXYGEN SATURATION: 93 % | SYSTOLIC BLOOD PRESSURE: 116 MMHG | TEMPERATURE: 98.4 F | HEART RATE: 73 BPM

## 2018-06-19 DIAGNOSIS — D61.818 PANCYTOPENIA (H): Primary | ICD-10-CM

## 2018-06-19 DIAGNOSIS — D69.6 THROMBOCYTOPENIA (H): ICD-10-CM

## 2018-06-19 DIAGNOSIS — D69.6 THROMBOCYTOPENIA (H): Primary | ICD-10-CM

## 2018-06-19 LAB
BASOPHILS # BLD AUTO: 0 10E9/L (ref 0–0.2)
BASOPHILS NFR BLD AUTO: 0.2 %
CHOLEST SERPL-MCNC: 115 MG/DL (ref 0–199)
COPATH REPORT: NORMAL
CREAT SERPL-MCNC: 0.51 MG/DL (ref 0.55–1.02)
DIFFERENTIAL METHOD BLD: ABNORMAL
EOSINOPHIL # BLD AUTO: 0.2 10E9/L (ref 0–0.7)
EOSINOPHIL NFR BLD AUTO: 4.1 %
ERYTHROCYTE [DISTWIDTH] IN BLOOD BY AUTOMATED COUNT: 14.7 % (ref 10–15)
GFR SERPL CREATININE-BSD FRML MDRD: >60 ML/MIN/1.73M2
GLUCOSE SERPL-MCNC: 90 MG/DL (ref 70–180)
HBA1C MFR BLD: 6.2 % (ref 4.3–5.6)
HCT VFR BLD AUTO: 33.5 % (ref 35–47)
HDLC SERPL-MCNC: 33 MG/DL
HGB BLD-MCNC: 11.1 G/DL (ref 11.7–15.7)
IMM GRANULOCYTES # BLD: 0 10E9/L (ref 0–0.4)
IMM GRANULOCYTES NFR BLD: 0.2 %
LDLC SERPL CALC-MCNC: 57 MG/DL (ref 0–129)
LYMPHOCYTES # BLD AUTO: 1.5 10E9/L (ref 0.8–5.3)
LYMPHOCYTES NFR BLD AUTO: 34 %
MCH RBC QN AUTO: 33.1 PG (ref 26.5–33)
MCHC RBC AUTO-ENTMCNC: 33.1 G/DL (ref 31.5–36.5)
MCV RBC AUTO: 100 FL (ref 78–100)
MONOCYTES # BLD AUTO: 0.3 10E9/L (ref 0–1.3)
MONOCYTES NFR BLD AUTO: 5.9 %
NEUTROPHILS # BLD AUTO: 2.5 10E9/L (ref 1.6–8.3)
NEUTROPHILS NFR BLD AUTO: 55.6 %
NONHDLC SERPL-MCNC: 82 MG/DL (ref 0–159)
NRBC # BLD AUTO: 0 10*3/UL
NRBC BLD AUTO-RTO: 0 /100
PLATELET # BLD AUTO: 53 10E9/L (ref 150–450)
RBC # BLD AUTO: 3.35 10E12/L (ref 3.8–5.2)
TRIGL SERPL-MCNC: 123 MG/DL (ref 0–149)
WBC # BLD AUTO: 4.4 10E9/L (ref 4–11)

## 2018-06-19 PROCEDURE — 36415 COLL VENOUS BLD VENIPUNCTURE: CPT

## 2018-06-19 PROCEDURE — 99214 OFFICE O/P EST MOD 30 MIN: CPT | Performed by: INTERNAL MEDICINE

## 2018-06-19 PROCEDURE — 85025 COMPLETE CBC W/AUTO DIFF WBC: CPT | Performed by: INTERNAL MEDICINE

## 2018-06-19 PROCEDURE — G0463 HOSPITAL OUTPT CLINIC VISIT: HCPCS

## 2018-06-19 ASSESSMENT — PAIN SCALES - GENERAL: PAINLEVEL: NO PAIN (0)

## 2018-06-19 NOTE — MR AVS SNAPSHOT
"              After Visit Summary   6/19/2018    Fani Escobedo    MRN: [id number]           Patient Information     Date Of Birth          1955        Visit Information        Provider Department      6/19/2018 3:00 PM Sheeba Montana MD [facility]        Care Instructions    CBC with next lab.  Continue oral iron.  See me before surgery.          Follow-ups after your visit        Your next 10 appointments already scheduled     Jul 09, 2018 10:00 AM CDT   New Visit with Trudy Cortez MD   [facility]    [id number] [place] [id number] [phone]              Who to contact     If you have questions or need follow up information about today's clinic visit or your schedule please contact [facility] directly at [phone].  Normal or non-critical lab and imaging results will be communicated to you by [employer], letter or phone within 4 business days after the clinic has received the results. If you do not hear from us within 7 days, please contact the clinic through [employer] or phone. If you have a critical or abnormal lab result, we will notify you by phone as soon as possible.  Submit refill requests through [employer] or call your pharmacy and they will forward the refill request to us. Please allow 3 business days for your refill to be completed.          Additional Information About Your Visit        MyChart Information     [employer] lets you send messages to your doctor, view your test results, renew your prescriptions, schedule appointments and more. To sign up, go to [email or web address] . Click on \"Log in\" on the left side of the screen, which will take you to the Welcome page. Then click on \"Sign up Now\" on the right side of the page.     You will be asked to enter the access code listed below, as well as some personal information. Please follow the directions to create your username and password.     Your " access code is: [email or web address]  Expires: 2018 11:37 AM     Your access code will  in 90 days. If you need help or a new code, please call your [place] clinic or [phone].        Care EveryWhere ID     This is your Care EveryWhere ID. This could be used by other organizations to access your [place] medical records  [id number]        Your Vitals Were     Pulse Temperature Respirations Pulse Oximetry          73 98.4  F (36.9  C) (Oral) 18 93%         Blood Pressure from Last 3 Encounters:   18 116/74   18 111/49   10/17/13 115/51    Weight from Last 3 Encounters:   18 124.7 kg (274 lb 14.6 oz)   10/16/13 132.1 kg (291 lb 3.6 oz)   13 (!) 142 kg (313 lb)              Today, you had the following     No orders found for display         Today's Medication Changes          These changes are accurate as of 18  4:20 PM.  If you have any questions, ask your nurse or doctor.               These medicines have changed or have updated prescriptions.        Dose/Directions    HYDROmorphone 4 MG tablet   Commonly known as:  DILAUDID   This may have changed:  reasons to take this   Used for:  Discitis        Dose:  4 mg   Take 1 tablet (4 mg) by mouth every 4 hours as needed   Quantity:  90 tablet   Refills:  0                Primary Care Provider Office Phone # Fax #    [facility] [phone] [id number]  [place] [address]        Equal Access to Services     HENRY BENITEZ AH: Hadsaira cortes Soreagan, waaxda luqadaha, qaybta kaalmada brad, grace warner. So [facility] [phone].    ATENCIÓN: Si habla español, tiene a hayes disposición servicios gratuitos de asistencia lingüística. Llame al [phone].    We comply with applicable federal civil rights laws and [place] laws. We do not discriminate on the basis of race, color, national origin, age, disability, sex, sexual orientation, or gender  identity.            Thank you!     Thank you for choosing [facility]  for your care. Our goal is always to provide you with excellent care. Hearing back from our patients is one way we can continue to improve our services. Please take a few minutes to complete the written survey that you may receive in the mail after your visit with us. Thank you!             Your Updated Medication List - Protect others around you: Learn how to safely use, store and throw away your medicines at www.disposemymeds.org.          This list is accurate as of 6/19/18  4:20 PM.  Always use your most recent med list.                   Brand Name Dispense Instructions for use Diagnosis    ACIDOPHILUS PO      Take 1 capsule by mouth daily        ASPIRIN ADULT LOW STRENGTH PO      Take 81 mg by mouth twice a week        Calcium carb-Vitamin D 500 mg [place]-200 units 500-200 MG-UNIT per tablet    OSCAL with D;Oyster Shell Calcium    90 tablet    Take 1 tablet by mouth 3 times daily (with meals)    Wound disruption, post-op, skin, initial encounter       ferrous sulfate 325 (65 Fe) MG tablet    IRON     Take 1 tablet by mouth 3 times daily (with meals)        GABAPENTIN PO      Take 1,200 mg by mouth 3 times daily 2 x 600 mg tab        HYDROmorphone 4 MG tablet    DILAUDID    90 tablet    Take 1 tablet (4 mg) by mouth every 4 hours as needed    Discitis       * KLOR-CON 8 MEQ CR tablet   Generic drug:  potassium chloride      Take 8 mEq by mouth daily in the afternoon        * KLOR-CON 8 MEQ CR tablet   Generic drug:  potassium chloride      Take 16 mEq by mouth 2 times daily AM and PM in addition to afternoon dose        LEVEMIR FLEXPEN/FLEXTOUCH 100 UNIT/ML injection   Generic drug:  insulin detemir      Inject 42 Units Subcutaneous 2 times daily        LEVOTHYROXINE SODIUM PO      Take 50 mcg by mouth daily        METFORMIN HCL PO      Take 1,000 mg by mouth 2 times daily (with meals)        * NOVOLOG SC      Inject 28 Units  Subcutaneous daily (with breakfast)        * NOVOLOG SC      Inject 30 Units Subcutaneous daily (with lunch)        * NOVOLOG SC      Inject 28 Units Subcutaneous daily (with dinner)        sulfamethoxazole-trimethoprim 800-160 MG per tablet    BACTRIM DS/SEPTRA DS     Take 1 tablet by mouth 2 times daily        vitamin B complex with vitamin C Tabs tablet    STRESS TAB     Take 1 tablet by mouth daily.        * Notice:  This list has 5 medication(s) that are the same as other medications prescribed for you. Read the directions carefully, and ask your doctor or other care provider to review them with you.

## 2018-06-19 NOTE — MR AVS SNAPSHOT
"              After Visit Summary   6/19/2018    Fani Escobedo    MRN: [id number]           Patient Information     Date Of Birth          1955        Visit Information        Provider Department      6/19/2018 2:00 PM Nurse,  Oncology [facility]        Today's Diagnoses     Thrombocytopenia (H)           Follow-ups after your visit        Your next 10 appointments already scheduled     Jul 09, 2018 10:00 AM CDT   New Visit with Trudy Cortez MD   [facility]    [address] [place] [id number] [phone]              Who to contact     If you have questions or need follow up information about today's clinic visit or your schedule please contact [facility] directly at [phone].  Normal or non-critical lab and imaging results will be communicated to you by MyChart, letter or phone within 4 business days after the clinic has received the results. If you do not hear from us within 7 days, please contact the clinic through MyChart or phone. If you have a critical or abnormal lab result, we will notify you by phone as soon as possible.  Submit refill requests through [employer] or call your pharmacy and they will forward the refill request to us. Please allow 3 business days for your refill to be completed.          Additional Information About Your Visit        MyChart Information     [employer] lets you send messages to your doctor, view your test results, renew your prescriptions, schedule appointments and more. To sign up, go to [email or web address] . Click on \"Log in\" on the left side of the screen, which will take you to the Welcome page. Then click on \"Sign up Now\" on the right side of the page.     You will be asked to enter the access code listed below, as well as some personal information. Please follow the directions to create your username and password.     Your access code is: [email or web address]  Expires: " 2018 11:37 AM     Your access code will  in 90 days. If you need help or a new code, please call your [place] clinic or [phone].        Care EveryWhere ID     This is your Care EveryWhere ID. This could be used by other organizations to access your [place] medical records  [id number]         Blood Pressure from Last 3 Encounters:   18 116/74   18 111/49   10/17/13 115/51    Weight from Last 3 Encounters:   18 124.7 kg (274 lb 14.6 oz)   10/16/13 132.1 kg (291 lb 3.6 oz)   13 (!) 142 kg (313 lb)              We Performed the Following     CBC with platelets differential          Today's Medication Changes          These changes are accurate as of 18  4:14 PM.  If you have any questions, ask your nurse or doctor.               These medicines have changed or have updated prescriptions.        Dose/Directions    HYDROmorphone 4 MG tablet   Commonly known as:  DILAUDID   This may have changed:  reasons to take this   Used for:  Discitis        Dose:  4 mg   Take 1 tablet (4 mg) by mouth every 4 hours as needed   Quantity:  90 tablet   Refills:  0                Primary Care Provider Office Phone # Fax #    [facility] [phone] [address]  [facility] [address]        Equal Access to Services     CANDACE BENITEZ AH: Olvin terryo Soreagan, waaxda luqadaha, qaybta kaalmada grace salinas. So [facility] [phone].    ATENCIÓN: Si habla español, tiene a hayes disposición servicios gratuitos de asistencia lingüística. Alee al [phone].    We comply with applicable federal civil rights laws and [place] laws. We do not discriminate on the basis of race, color, national origin, age, disability, sex, sexual orientation, or gender identity.            Thank you!     Thank you for choosing [facility]  for your care. Our goal is always to provide you with excellent care. Hearing  back from our patients is one way we can continue to improve our services. Please take a few minutes to complete the written survey that you may receive in the mail after your visit with us. Thank you!             Your Updated Medication List - Protect others around you: Learn how to safely use, store and throw away your medicines at www.disposemymeds.org.          This list is accurate as of 6/19/18  4:14 PM.  Always use your most recent med list.                   Brand Name Dispense Instructions for use Diagnosis    ACIDOPHILUS PO      Take 1 capsule by mouth daily        ASPIRIN ADULT LOW STRENGTH PO      Take 81 mg by mouth twice a week        Calcium carb-Vitamin D 500 mg [place]-200 units 500-200 MG-UNIT per tablet    OSCAL with D;Oyster Shell Calcium    90 tablet    Take 1 tablet by mouth 3 times daily (with meals)    Wound disruption, post-op, skin, initial encounter       ferrous sulfate 325 (65 Fe) MG tablet    IRON     Take 1 tablet by mouth 3 times daily (with meals)        GABAPENTIN PO      Take 1,200 mg by mouth 3 times daily 2 x 600 mg tab        HYDROmorphone 4 MG tablet    DILAUDID    90 tablet    Take 1 tablet (4 mg) by mouth every 4 hours as needed    Discitis       * KLOR-CON 8 MEQ CR tablet   Generic drug:  potassium chloride      Take 8 mEq by mouth daily in the afternoon        * KLOR-CON 8 MEQ CR tablet   Generic drug:  potassium chloride      Take 16 mEq by mouth 2 times daily AM and PM in addition to afternoon dose        LEVEMIR FLEXPEN/FLEXTOUCH 100 UNIT/ML injection   Generic drug:  insulin detemir      Inject 42 Units Subcutaneous 2 times daily        LEVOTHYROXINE SODIUM PO      Take 50 mcg by mouth daily        METFORMIN HCL PO      Take 1,000 mg by mouth 2 times daily (with meals)        * NOVOLOG SC      Inject 28 Units Subcutaneous daily (with breakfast)        * NOVOLOG SC      Inject 30 Units Subcutaneous daily (with lunch)        * NOVOLOG SC      Inject 28 Units Subcutaneous  daily (with dinner)        sulfamethoxazole-trimethoprim 800-160 MG per tablet    BACTRIM DS/SEPTRA DS     Take 1 tablet by mouth 2 times daily        vitamin B complex with vitamin C Tabs tablet    STRESS TAB     Take 1 tablet by mouth daily.        * Notice:  This list has 5 medication(s) that are the same as other medications prescribed for you. Read the directions carefully, and ask your doctor or other care provider to review them with you.

## 2018-06-19 NOTE — PROGRESS NOTES
Medical Assistant Note:  Fani Escobedo presents today for labs.    Patient seen by provider today: Yes: Rubén.   present during visit today: Not Applicable.    Concerns: No Concerns.    Procedure:  Lab draw site: LAC, Needle type: BF, Gauge: 23.    Post Assessment:  Labs drawn without difficulty: No; pt drawn several times, before RN Yasmien successfully obtained blood. Also Pink and purple top drawn blood for type and cross if needed.    Discharge Plan:  Pt tolerated procedure, hot packs, warm blankets, and water to drink was given to patient to help draw specimen.     Face to Face Time: 10-15minutes.    GARLAND Hobbs MA June Kuntze, RN

## 2018-06-19 NOTE — Clinical Note
"    6/19/2018         RE: Fani Escobedo  [address] [place] [address]        Dear Colleague,    Thank you for referring your patient, Fani Escobedo, to the [facility]. Please see a copy of my visit note below.    Oncology Rooming Note    June 19, 2018 2:20 PM   Fani Escobedo is a 63 year old female who presents for:    Chief Complaint   Patient presents with     Oncology Clinic Visit     Thrombocytopenia      Initial Vitals: /74 (BP Location: Right arm, Patient Position: Sitting, Cuff Size: Adult Large)  Pulse 73  Temp 98.4  F (36.9  C) (Oral)  Resp 18  SpO2 93% Estimated body mass index is 38.56 kg/(m^2) as calculated from the following:    Height as of 6/5/18: 1.798 m (5' 10.8\").    Weight as of 6/7/18: 124.7 kg (274 lb 14.6 oz). There is no height or weight on file to calculate BSA.  No Pain (0) Comment: Data Unavailable   No LMP recorded. Patient is postmenopausal.  Allergies reviewed: Yes  Medications reviewed: Yes    Medications: Medication refills not needed today.  Pharmacy name entered into EPIC:    [facility] - [place], [place] - [address] [place] [address] [address]  [id number] [place], [place] - [address] [id number] - [place], [place] - [address] AT [facility] [place] - [address]    Clinical concerns: None                  4 minutes for nursing intake (face to face time)     Kristal David MA              Visit Date:   06/19/2018      HEMATOLOGY/ONCOLOGY HISTORY:  Ms. Escobedo is a female with pancytopenia and endometrial cancer.   1.  Patient has chronic thrombocytopenia going back to 2012.  Over the years her thrombocytopenia has gotten worse.  She also has been anemic.   2.  Patient presented to the ER because of vaginal bleeding.  Multiple labs were done on 06/05/2018.     -WBC 3.3, hemoglobin 10.7, platelets of 41.  MCV of 99.   -INR of 1.18 and PTT of 35.     -TSH normal " at 1.24.   -BMP normal.   -Peripheral blood smear revealed normochromic, normocytic anemia, leukopenia and thrombocytopenia.   3.  Multiple investigations were done in June,2018.     -Normal vitamin B12.   -Normal iron studies.   -Normal LDH.   -Normal SPEP.   -Bone marrow biopsy was done on 06/07/2018.  It reveals mildly hypercellular bone marrow with erythroid hyperplasia.  50 to 60% cellularity.  No evidence of myelodysplasia or malignancy.  Megakaryocytes are adequate in number and they are normal morphologically.  Flow cytometry is negative.   4.  D & C was done on 06/06/2018.  Pathology revealed endometrioid adenocarcinoma, FIGO grade1.   5.  Right upper quadrant ultrasound on 06/07/2018 reveals fatty liver and hepatomegaly.  Liver measures 21 cm.      SUBJECTIVE:    Ms. Escobedo is a 63-year-old female who was recently seen in the hospital for pancytopenia.  She had multiple investigations done.  Bone marrow biopsy was done.  It is essentially normal.      Patient has history of MRSA infection.  She is on chronic Bactrim for many years.      Patient also has fatty liver.  She does not have any liver cirrhosis.      Her pancytopenia is due to chronic Bactrim use and fatty liver.  There is probably a component of ITP.  Based on the bone marrow biopsy, ITP cannot be completely excluded.      Patient's overall condition is stable.  She is scheduled to see gynecology/oncology for endometrial carcinoma.      She continues to have occasional vaginal bleed.  No bleeding from any other site.  No easy bruising.      LABORATORY DATA:  Pending.      ASSESSMENT:   1.  A 63-year-old female with pancytopenia. Pancytopenia is multifactorial.  It is due to Bactrim and fatty liver.  There is also immune thrombocytopenia (ITP) contributing to thrombocytopenia.   2.  Newly diagnosed endometrial adenocarcinoma.   3.  Fatty liver.   4.  Multiple other medical problems including diabetes mellitus and hypothyroidism.      PLAN:   1.  I  had a long discussion with the patient and her family.  Bone marrow was reviewed.  I explained to her that there is no evidence of any malignancy.  She was happy to know that.      Discussed regarding her pancytopenia.  I explained to her it is due to multiple factors.  She is on chronic Bactrim.  That will cause some pancytopenia.  She has fatty liver.  That will also cause pancytopenia.      On bone marrow the megakaryocytes are adequate in number and morphology.  That brings up the question of immune thrombocytopenia. Patient most likely has superimposed immune thrombocytopenia that is causing some thrombocytopenia.      2.  In terms of pancytopenia, supportive treatment with transfusion will be done as needed.  At this time, we will simply monitor CBC.  CBC was done today.  She will be inform of the result.      Patient has endometrial cancer.  She will be requiring surgery.  Before surgery, we may have to treat her thrombocytopenia. I explained to the patient that we have different options.  One will be to give platelet transfusion and see how she responds.  Other option would be also to treat her for immune thrombocytopenia with steroid.  We will first start with platelet transfusion.      3.  Patient had a few questions, which were all answered.  Advised her to see a physician if she has an abnormal bleeding, worsening weakness, red skin rash, recurrent infection or any other concerns.  I will see her before she has the surgery for endometrial carcinoma.      Total face-to-face time spent 25 minutes, more than 50% of the time spent in counseling and coordination of care.         NORMAN JONES MD             D: 2018   T: 2018   MT: RINKU      Name:     BELKYS SHELL   MRN:      [id number]        Account:      [id number]   :      1955           Visit Date:   2018      Document: [id number]        Again, thank you for allowing me to participate in the care of your patient.         Sincerely,        Sheeba Montana MD

## 2018-06-19 NOTE — PATIENT INSTRUCTIONS
CBC with next lab. Patient will do with her pre-op (not scheduled yet)/Mone  Continue oral iron.  See me before surgery. Surgery not scheduled yet/[place] spoke with patient- she will call to schedule when her surgery is set up

## 2018-06-19 NOTE — PROGRESS NOTES
Visit Date:   06/19/2018      HEMATOLOGY/ONCOLOGY HISTORY:  Ms. Escobedo is a female with pancytopenia and endometrial cancer.   1.  Patient has chronic thrombocytopenia going back to 2012.  Over the years her thrombocytopenia has gotten worse.  She also has been anemic.   2.  Patient presented to the ER because of vaginal bleeding.  Multiple labs were done on 06/05/2018.     -WBC 3.3, hemoglobin 10.7, platelets of 41.  MCV of 99.   -INR of 1.18 and PTT of 35.     -TSH normal at 1.24.   -BMP normal.   -Peripheral blood smear revealed normochromic, normocytic anemia, leukopenia and thrombocytopenia.   3.  Multiple investigations were done in June,2018.     -Normal vitamin B12.   -Normal iron studies.   -Normal LDH.   -Normal SPEP.   -Bone marrow biopsy was done on 06/07/2018.  It reveals mildly hypercellular bone marrow with erythroid hyperplasia.  50 to 60% cellularity.  No evidence of myelodysplasia or malignancy.  Megakaryocytes are adequate in number and they are normal morphologically.  Flow cytometry is negative.   4.  D & C was done on 06/06/2018.  Pathology revealed endometrioid adenocarcinoma, FIGO grade1.   5.  Right upper quadrant ultrasound on 06/07/2018 reveals fatty liver and hepatomegaly.  Liver measures 21 cm.      SUBJECTIVE:    Ms. Escobedo is a 63-year-old female who was recently seen in the hospital for pancytopenia.  She had multiple investigations done.  Bone marrow biopsy was done.  It is essentially normal.      Patient has history of MRSA infection.  She is on chronic Bactrim for many years.      Patient also has fatty liver.  She does not have any liver cirrhosis.      Her pancytopenia is due to chronic Bactrim use and fatty liver.  There is probably a component of ITP.  Based on the bone marrow biopsy, ITP cannot be completely excluded.      Patient's overall condition is stable.  She is scheduled to see gynecology/oncology for endometrial carcinoma.      She continues to have occasional vaginal  bleed.  No bleeding from any other site.  No easy bruising.      LABORATORY DATA:  Pending.      ASSESSMENT:   1.  A 63-year-old female with pancytopenia. Pancytopenia is multifactorial.  It is due to Bactrim and fatty liver.  There is also immune thrombocytopenia (ITP) contributing to thrombocytopenia.   2.  Newly diagnosed endometrial adenocarcinoma.   3.  Fatty liver.   4.  Multiple other medical problems including diabetes mellitus and hypothyroidism.      PLAN:   1.  I had a long discussion with the patient and her family.  Bone marrow was reviewed.  I explained to her that there is no evidence of any malignancy.  She was happy to know that.      Discussed regarding her pancytopenia.  I explained to her it is due to multiple factors.  She is on chronic Bactrim.  That will cause some pancytopenia.  She has fatty liver.  That will also cause pancytopenia.      On bone marrow the megakaryocytes are adequate in number and morphology.  That brings up the question of immune thrombocytopenia. Patient most likely has superimposed immune thrombocytopenia that is causing some thrombocytopenia.      2.  In terms of pancytopenia, supportive treatment with transfusion will be done as needed.  At this time, we will simply monitor CBC.  CBC was done today.  She will be inform of the result.      Patient has endometrial cancer.  She will be requiring surgery.  Before surgery, we may have to treat her thrombocytopenia. I explained to the patient that we have different options.  One will be to give platelet transfusion and see how she responds.  Other option would be also to treat her for immune thrombocytopenia with steroid.  We will first start with platelet transfusion.      3.  Patient had a few questions, which were all answered.  Advised her to see a physician if she has an abnormal bleeding, worsening weakness, red skin rash, recurrent infection or any other concerns.  I will see her before she has the surgery for  endometrial carcinoma.      Total face-to-face time spent 25 minutes, more than 50% of the time spent in counseling and coordination of care.         NORMAN JONES MD             D: 2018   T: 2018   MT: RINKU      Name:     BELKYS SHELL   MRN:      [id number]        Account:      [id number]   :      1955           Visit Date:   2018      Document: [id number]

## 2018-06-19 NOTE — PROGRESS NOTES
"Oncology Rooming Note    June 19, 2018 2:20 PM   Fani Escobedo is a 63 year old female who presents for:    Chief Complaint   Patient presents with     Oncology Clinic Visit     Thrombocytopenia      Initial Vitals: /74 (BP Location: Right arm, Patient Position: Sitting, Cuff Size: Adult Large)  Pulse 73  Temp 98.4  F (36.9  C) (Oral)  Resp 18  SpO2 93% Estimated body mass index is 38.56 kg/(m^2) as calculated from the following:    Height as of 6/5/18: 1.798 m (5' 10.8\").    Weight as of 6/7/18: 124.7 kg (274 lb 14.6 oz). There is no height or weight on file to calculate BSA.  No Pain (0) Comment: Data Unavailable   No LMP recorded. Patient is postmenopausal.  Allergies reviewed: Yes  Medications reviewed: Yes    Medications: Medication refills not needed today.  Pharmacy name entered into EPIC:    [facility] - [place], [place] - [address] [place] [address]  [id number] [place] [facility] [address] [facility] - [place] - [address] AT [facility] [place]  [place] - [address]    Clinical concerns: None                  4 minutes for nursing intake (face to face time)     Kristal David MA            "

## 2018-06-19 NOTE — LETTER
6/19/2018         RE: Fani Escobedo  [address] [place] [address]        Dear Colleague,    Thank you for referring your patient, Fani Escobeod, to the [facility]. Please see a copy of my visit note below.    Medical Assistant Note:  Fani Escobedo presents today for labs.    Patient seen by provider today: Yes: Rubén.   present during visit today: Not Applicable.    Concerns: No Concerns.    Procedure:  Lab draw site: LAC, Needle type: BF, Gauge: 23.    Post Assessment:  Labs drawn without difficulty: No; pt drawn several times, before RN Yasmine successfully obtained blood. Also Pink and purple top drawn blood for type and cross if needed.    Discharge Plan:  Pt tolerated procedure, hot packs, warm blankets, and water to drink was given to patient to help draw specimen.     Face to Face Time: 10-15minutes.    GARLAND Hobbs MA June Kuntze, RN              Again, thank you for allowing me to participate in the care of your patient.        Sincerely,        Oncology Nurse

## 2018-06-29 ENCOUNTER — PRE VISIT (OUTPATIENT)
Dept: ONCOLOGY | Facility: CLINIC | Age: 63
End: 2018-06-29

## 2018-07-03 NOTE — PROGRESS NOTES
Consult Notes on Referred Patient    Date: 2018     Dr. Tip Ortega  RE: Fani Escobedo  : 1955  MARTHA: 2018     Fani Escobedo is a very pleasant 63 year old woman with a new diagnosis of FIGO grade 1 endometrioid adenocarcinoma. Given these findings she was subsequently sent [facility] for new patient consultation.     Brief History:  Ms. Escobedo is a female with pancytopenia and endometrial cancer-grade 1 on biopsy    1.  Patient has chronic thrombocytopenia going back to .  Over the years her thrombocytopenia has gotten worse.  She also has been anemic.   2.  Patient presented to the ER because of vaginal bleeding.  Multiple labs were done on 2018.     -WBC 3.3, hemoglobin 10.7, platelets of 41.  MCV of 99.   -INR of 1.18 and PTT of 35.     -TSH normal at 1.24.   -BMP normal.   -Peripheral blood smear revealed normochromic, normocytic anemia, leukopenia and thrombocytopenia.   3.  Multiple investigations were done in .     -Normal vitamin B12.   -Normal iron studies.   -Normal LDH.   -Normal SPEP.   -Bone marrow biopsy was done on 2018.  It reveals mildly hypercellular bone marrow with erythroid hyperplasia.  50 to 60% cellularity.  No evidence of myelodysplasia or malignancy.  Megakaryocytes are adequate in number and they are normal morphologically.  Flow cytometry is negative.   4.  D & C was done on 2018.  Pathology revealed endometrioid adenocarcinoma, FIGO grade1.   5.  Right upper quadrant ultrasound on 2018 reveals fatty liver and hepatomegaly.  Liver measures 21 cm. No liver cirrhosis.     18: D&C  SPECIMEN(S):   Endometrial curettings     FINAL DIAGNOSIS:   Uterus, Endometrium: Curettage   - Endometrioid adenocarcinoma, provisional FIGO grade 1     2018: Saw Dr. Montana for pancytopenia, multifactorial. On chronic Bactrim due to MRSA and susana in back can cause pancytopenia. ? Immune thrombocytopenia    HPI:  Bleeding intermitent in nature over past 2 years. Saw OB/GYN in [place] with attempted biopsy that failed due to sever pain. Returned to [place] for the summer, constant bleeding with large clots, more lightheadedness and fatigue. 10 lbs unintentional weight loss over past 3-4 mos.  US done with Dr. Ortega shows thickened endometrial stripe of 18 mm. Just spotting since D and C. Had been large golf ball to tennis ball sized clots.   Has DM had amputation of the right below the knee-blister in bottom of foot and it became infected. Sugars run around 200 usually.    Review of Systems:  Systemic           no weight changes; no fever; no chills; no night sweats; no appetite changes  Skin           no rashes, or lesions  Eye           no irritation; no changes in vision  Dilma-Laryngeal           no dysphagia; no hoarseness   Pulmonary    no cough; no shortness of breath  Cardiovascular    no chest pain; no palpitations  Gastrointestinal    no diarrhea; no constipation; no abdominal pain; no changes in bowel  habits; no blood in stool  Genitourinary   no urinary frequency; no urinary urgency; no dysuria; no pain; + abnormal vaginal discharge; no abnormal vaginal bleeding  Breast   no breast discharge; no breast changes; no breast pain  Musculoskeletal    no myalgias; no arthralgias; no back pain  Psychiatric           no depressed mood; no anxiety    Hematologic           no tender lymph nodes; no noticeable swellings or lumps   Endocrine    no hot flashes; no heat/cold intolerance         Neurological   no tremor; no numbness and tingling; no headaches; no difficulty  sleeping    I have reviewed and addressed the patient's review of symptoms for today's visit.     Past Medical History:  Past Medical History:   Diagnosis Date     Anemia     iron deficiency     Asthma      Charcot foot due to diabetes mellitus (H)     (R) foot -> BKA     COPD (chronic obstructive pulmonary disease) (H)      Delirium      Depression      Diabetes  "mellitus (H)     type II     Diskitis 5/2013     Hypothyroid      Obesity     bmi 46     USHA (obstructive sleep apnea)     does not use CPAP     Osteomyelitis (H)      Peripheral neuropathy      Sepsis (H)     MRSA bacteremia     Tobacco abuse      Multiple UTIs as a child-told she had surgeries for this as a child but states these were not done abdominally \"had to clear scar tissue\".    Past Surgical History:  Past Surgical History:   Procedure Laterality Date     AMPUTATE LEG BELOW KNEE  4/14/2012    Procedure:AMPUTATE LEG BELOW KNEE; right leg below knee amputation; Surgeon:WILMAR LUI; Location: OR     AMPUTATE TOE(S)  5/1/2013    Procedure: AMPUTATE TOE(S);  PARTIAL LEFT 3RD TOE AMPUTATION;  Surgeon: Juancarlos Gamez DPM;  Location:  OR     AMPUTATION      (L) 2nd toe     ANESTHESIA OUT OF OR MRI  6/11/2013    Procedure: ANESTHESIA OUT OF OR MRI;  MRI UNDER GENERAL ANESTHESIA;  Surgeon: Provider, Generic Anesthesia;  Location:  OR     ANESTHESIA OUT OF OR MRI  9/4/2013    Procedure: ANESTHESIA OUT OF OR MRI;  ANESTHESIA OUT OF OR MRI;  Surgeon: Provider, Generic Anesthesia;  Location:  OR     BONE MARROW BIOPSY, BONE SPECIMEN, NEEDLE/TROCAR N/A 6/7/2018    Procedure: BIOPSY BONE MARROW;  bone marrow biopsy;  Surgeon: Farzana Teixeira MD;  Location:  GI     DILATION AND CURETTAGE N/A 6/6/2018    Procedure: DILATION AND CURETTAGE;  DILATION AND CURETTAGE ;  Surgeon: Tip Ortega MD;  Location:  OR     EXPLORE SPINE, REMOVE HARDWARE, COMBINED  10/11/2013    Procedure: COMBINED EXPLORE SPINE, REMOVE HARDWARE;  EXPLORATION AND REVISION POSTERIOR THORACIC WOUND WITH WOUND VAC PLACEMENT.;  Surgeon: Thomas Ellis MD;  Location:  OR     FUSION SPINE ANTERIOR THORACIC ONE LEVEL  9/5/2013    Procedure: FUSION SPINE ANTERIOR THORACIC ONE LEVEL;  Right Thoracotomy For  T7-T8 Thorasic Vertebral Body Resection with Strut Graft, [place] Instrumentation T6-T9, BMP, Pyramesh and " Intra-operative Neuro Monitoring ;  Surgeon: Thomas Ellis MD;  Location: [facility] OR     INCISION AND DRAINAGE RECTUM, COMBINED  2012    Procedure: COMBINED INCISION AND DRAINAGE RECTUM;  I&D ABSCESS RIGHT BUTTOCK (MRSA );  Surgeon: Christos Linton MD;  Location: [facility] OR     INCISION AND DRAINAGE SPINE, CLOSE WOUND, COMBINED  10/14/2013    Procedure: COMBINED INCISION AND DRAINAGE SPINE, CLOSE WOUND;  EXPLORATION/REVISION POSTERIOR THORACIC WOUND COMPLEX 15 CM. ;  Surgeon: Thomas Ellis MD;  Location: [facility] OR     IRRIGATION AND DEBRIDEMENT TRUNK, COMBINED  6/10/2012    Procedure: COMBINED IRRIGATION AND DEBRIDEMENT TRUNK;  Incision and drainage abscess right buttock;  Surgeon: Christos Linton MD;  Location: [facility] OR     OPTICAL TRACKING SYSTEM FUSION POSTERIOR SPINE THORACIC THREE+ LEVELS  2013    Procedure: OPTICAL TRACKING SYSTEM FUSION POSTERIOR SPINE THORACIC THREE+ LEVELS;  T4-T11 POSTERIOR LATERAL PEDICLE SCREW INSTRUMENTATION WITH IMAGE GUIDANCE AND NEURO-MONITORING;  Surgeon: Thomas Ellis MD;  Location: [facility] OR     Spinal surgeries x 2 complicated by MRSA    x 1-complicated by wound infection.    Health Maintenance:  Health Maintenance Due   Topic Date Due     EYE EXAM Q1 YEAR  1956     MICROALBUMIN Q1 YEAR  1956     PHQ-2 Q1 YR  1967     HIV SCREEN (SYSTEM ASSIGNED)  1973     HEPATITIS C SCREENING  1973     PAP SCREENING Q3 YR (SYSTEM ASSIGNED)  1976     MAMMO SCREEN Q2 YR (SYSTEM ASSIGNED)  2005     COLON CANCER SCREEN (SYSTEM ASSIGNED)  2005     ADVANCE DIRECTIVE PLANNING Q5 YRS  2010       Last Pap Smear: 5/3/17  Result:  nilm  She has had a history of abnormal Pap smears.    Last Mammogram: Done at  3 years ago            Result: normal      She has had a history of abnormal mammograms.    Last Colonoscopy: Within 10 years           Result: normal except polyps per report.    Last Thyroid  Screening:        TSH   Date Value Ref Range Status   2018 1.24 0.40 - 4.00 mU/L Final       Last Cholest Screening:        Cholesterol (mg/dL)   Date Value   2018 115     LDL Cholesterol Calculated (mg/dL)   Date Value   2018 57     HDL Cholesterol (mg/dL)   Date Value   2018 33 (A)     Triglycerides (mg/dL)   Date Value   2018 123     18 hgbA1c 6.2    Current Medications:   has a current medication list which includes the following prescription(s): aspirin, vitamin  b complex, calcium carb-vitamin d 500 mg [place]-200 units, ferrous sulfate, gabapentin, hydromorphone, insulin aspart, insulin aspart, insulin aspart, insulin detemir, lactobacillus, levothyroxine sodium, metformin hcl, potassium chloride, potassium chloride, and sulfamethoxazole-trimethoprim.     Allergies:      Allergies   Allergen Reactions     Adhesive Tape      Cephalosporins Anaphylaxis     Naproxen Anaphylaxis     Penicillins      Augmentin Rash     Benadryl [Anti-Itch] Rash     Morphine Hcl Itching and Rash         Social History:  Social History   Substance Use Topics     Smoking status: Current Some Day Smoker     Years: 45.00     Smokeless tobacco: Never Used     Alcohol use Yes      Comment: occ       History   Drug Use No       Family History:   The patient's family history is notable for:    Paternal aunt  of cancer? Uterine or ovarian cancer?  No breast or colon cancers in family.    LABS:  Lab Results   Component Value Date    WBC 4.4 2018     Lab Results   Component Value Date    RBC 3.35 2018     Lab Results   Component Value Date    HGB 11.1 2018     Lab Results   Component Value Date    HCT 33.5 2018     Lab Results   Component Value Date     2018     Lab Results   Component Value Date    MCH 33.1 2018     Lab Results   Component Value Date    MCHC 33.1 2018     Lab Results   Component Value Date    RDW 14.7 2018     Lab Results   Component  Value Date    PLT 53 06/19/2018     Cr=.51  Physical Exam:   /66  Pulse 75  Temp 98.4  F (36.9  C) (Oral)  Resp 16  Wt 119.7 kg (264 lb)  SpO2 96%  BMI 37.03 kg/m2  Body mass index is 37.03 kg/(m^2).    General Appearance: healthy and alert, no distress, in wheelchair, leg not in prosthesis due to swelling of stump has not been wearing wrap.      HEENT:  no thyromegaly, no palpable nodules or masses        Cardiovascular: regular rate and rhythm, no gallops, rubs or murmurs     Respiratory: lungs clear, no rales, rhonchi or wheezes, normal diaphragmatic excursion    Musculoskeletal: extremities non tender and without edema    Skin: no lesions or rashes     Neurological: normal gait, no gross defects     Psychiatric: appropriate mood and affect                               Hematological: normal cervical, supraclavicular and inguinal lymph nodes     Gastrointestinal:       Abdomen morbidly obese, large pannus, firm, unable to determine if hernia present based on size and skin changes.    Genitourinary: External genitalia and urethral meatus appears normal.  Vagina is smooth without nodularity or masses.  Cervix appears normal and without lesions.  Bimanual exam reveal no masses, nodularity or fullness. Mobile uterus. Significant pain with moving patient to the exam table.      Assessment:    Fani Escobedo is a 63 year old woman with a new diagnosis of endometrial cancer; grade 1 endometrioid adenocarcinoma.     Plan:    1.)    Endometrial Cancer: Grade 1. Discussed options for treating grade 1 endometrial cancer including hormonal options vs. Surgery. Patient does not want to manage this hormonally with mirena IUD or oral progesterone. Explained that may be our only option depending on surgical risk. Aware of high risk for infection due to DM. Risks associated with smoking and sleep apnea with anesthesia. Reviewed general surgical approach to endometrial cancer including hysterectomy, bilateral  salpingo-oophorectomy, possible lymph node dissection to both treat the disease, determine extent of spread, and identify pathologic risk factors which may dictate need for post-operative adjuvant therapy. She desires surgical management. Discussed approach via laparotomy versus minimally invasive robotic-assisted approach. Patient is an appropriate candidate for robotic approach. Therefore, would recommend robotic-assisted hysterectomy, bilateral salpingo-oophorectomy with possible pelvic and para-aortic lymph node dissection if invasive disease identified on frozen pathology analysis. Risks, benefits and alternatives to proceed discussed in detail with the patient. Risks include but are not limited to bleeding, infection, possible injury to surrounding organs including bowel, bladder, ureter, need for second procedure/surgery related to complications from first procedure, postoperative medical complications. Also discussed specific risks related to robotic procedure including potential for conversion to laparotomy, vaginal cuff dehiscence, nerve injury. Consent for surgery obtained. Will obtain PAC visit to determine if patient ok for surgery. Last plts 53, may require plt transfusion prior to surgery. HX of MRSA+-contact isolation required.     2.) Genetic risk factors were assessed and the patient does not meet the qualifications for a referral at this time.      3.) Labs and/or tests ordered include:  Pre-op labs, EKG, PAC referral, CT CAP     4.) Health maintenance issues addressed today include DM    5.) Pre-op teaching was completed today.  Risks of surgery were discussed to include: bleeding, transfusion, infection, unintentional injury to surrounding organs/structures.      Thank you for allowing us to participate in the care of your patient.       Sincerely,    Trudy Cortez MD    Department of Ob/Gyn and Women's Health  Division of Gynecologic [facility]  [facility]  [phone]      Patient Care Team:  Gaby Glasgow Cushing as PCP - General  CLINIC, [facility]

## 2018-07-09 ENCOUNTER — ONCOLOGY VISIT (OUTPATIENT)
Dept: ONCOLOGY | Facility: CLINIC | Age: 63
End: 2018-07-09
Payer: COMMERCIAL

## 2018-07-09 VITALS
TEMPERATURE: 98.4 F | OXYGEN SATURATION: 96 % | BODY MASS INDEX: 37.03 KG/M2 | RESPIRATION RATE: 16 BRPM | DIASTOLIC BLOOD PRESSURE: 66 MMHG | WEIGHT: 264 LBS | SYSTOLIC BLOOD PRESSURE: 105 MMHG | HEART RATE: 75 BPM

## 2018-07-09 DIAGNOSIS — C54.1 ENDOMETRIAL CANCER (H): ICD-10-CM

## 2018-07-09 DIAGNOSIS — N92.0 EXCESSIVE OR FREQUENT MENSTRUATION: ICD-10-CM

## 2018-07-09 DIAGNOSIS — D61.818 PANCYTOPENIA (H): ICD-10-CM

## 2018-07-09 PROCEDURE — 99205 OFFICE O/P NEW HI 60 MIN: CPT | Performed by: OBSTETRICS & GYNECOLOGY

## 2018-07-09 RX ORDER — PHENAZOPYRIDINE HYDROCHLORIDE 100 MG/1
200 TABLET, FILM COATED ORAL ONCE
Status: CANCELLED | OUTPATIENT
Start: 2018-07-09 | End: 2018-07-09

## 2018-07-09 RX ORDER — HEPARIN SODIUM 10000 [USP'U]/ML
5000 INJECTION, SOLUTION INTRAVENOUS; SUBCUTANEOUS
Status: CANCELLED | OUTPATIENT
Start: 2018-07-09

## 2018-07-09 RX ORDER — FERROUS SULFATE 325(65) MG
325 TABLET ORAL
Qty: 100 TABLET | Refills: 1 | Status: ON HOLD | OUTPATIENT
Start: 2018-07-09 | End: 2018-08-10

## 2018-07-09 ASSESSMENT — PAIN SCALES - GENERAL: PAINLEVEL: SEVERE PAIN (7)

## 2018-07-09 NOTE — MR AVS SNAPSHOT
After Visit Summary   7/9/2018    Fani Escobedo    MRN: [id number]           Patient Information     Date Of Birth          1955        Visit Information        Provider Department      7/9/2018 10:00 AM Trudy Cortez MD [facility]        Today's Diagnoses     Pancytopenia (H)        Endometrial cancer (H)        Excessive or frequent menstruation           Follow-ups after your visit        Additional Services     PAC Visit Referral (For [facility] Only)       Does this visit require an Anesthesia consult?  Yes - Evaluate for medical necessity related to one of the following conditions:  Other: sleep apnea, morbid obesity, DM, smoker, pancytopenia: thrombocytopenia    H&P done by:  N/A and Surgeon/Designee      Please be aware that coverage of these services is subject to the terms and limitations of your health insurance plan.  Call member services at your health plan with any benefit or coverage questions.      Please bring the following to your appointment:  >>   Any x-rays, CTs or MRIs which have been performed.  Contact the facility where they were done to arrange for  prior to your scheduled appointment.  Any new CT, MRI or other procedures ordered by your specialist must be performed at a [place] facility or coordinated by your clinic's referral office.    >>   List of current medications  >>   This referral request   >>   Any documents/labs given to you for this referral                  Your next 10 appointments already scheduled     Jul 19, 2018 11:00 AM CDT   (Arrive by 10:45 AM)   PAC EVALUATION with SHAUNA Gutiérrez   [facility] ([facility] [place] [facility])    [address] [place] [address]  4th [facility] [address] [phone]            Jul 19, 2018 12:00 PM CDT   (Arrive by 11:45 AM)   PAC RN ASSESSMENT with Jannet Pac Rn   [facility] ([facility] [place])     [address]  4th [facility] [address]   [phone]            Jul 19, 2018 12:20 PM CDT   (Arrive by 12:05 PM)   PAC Anesthesia Consult with  Pac Anesthesiologist   [facility] ([facility])    [address] [place] [address]  4th [facility] [address]   [phone]            Jul 19, 2018 12:40 PM CDT   CT CHEST/ABDOMEN/PELVIS W CONTRAST with UCCT1   [facility] [place] [facility] ([facility])    [address]  [address]st [facility] [address]   [phone]           Please bring any scans or X-rays taken at other hospitals, if similar tests were done. Also bring a list of your medicines, including vitamins, minerals and over-the-counter drugs. It is safest to leave personal items at home.  Be sure to tell your doctor:   If you have any allergies.   If there s any chance you are pregnant.   If you are breastfeeding.  How to prepare:   Do not eat or drink for 2 hours before your exam. If you need to take medicine, you may take it with small sips of water. (We may ask you to take liquid medicine as well.)   Please wear loose clothing, such as a sweat suit or jogging clothes. Avoid snaps, zippers and other metal. We may ask you to undress and put on a hospital gown.  Please arrive 30 minutes early for your CT. Once in the department you might be asked to drink water 15-20 minutes prior to your exam.  If indicated you may be asked to drink an oral contrast in advance of your CT.  If this is the case, the imaging team will let you know or be in contact with you prior to your appointment  Patients over 70 or patients with diabetes or kidney problems:   If you haven t had a blood test (creatinine test) within the last 30 days, the Cardiologist/Radiologist may require you to get this test prior to your exam.  If you have diabetes:   Continue to take your metformin medication on the day of your exam  If you have  any questions, please call the Imaging Department where you will have your exam.              Who to contact     If you have questions or need follow up information about today's clinic visit or your schedule please contact [facility] directly at [phone].  Normal or non-critical lab and imaging results will be communicated to you by MyChart, letter or phone within 4 business days after the clinic has received the results. If you do not hear from us within 7 days, please contact the clinic through MyChart or phone. If you have a critical or abnormal lab result, we will notify you by phone as soon as possible.  Submit refill requests through [employer] or call your pharmacy and they will forward the refill request to us. Please allow 3 business days for your refill to be completed.          Additional Information About Your Visit        Care EveryWhere ID     This is your Care EveryWhere ID. This could be used by other organizations to access your [place] medical records  [id number]        Your Vitals Were     Pulse Temperature Respirations Pulse Oximetry BMI (Body Mass Index)       75 98.4  F (36.9  C) (Oral) 16 96% 37.03 kg/m2        Blood Pressure from Last 3 Encounters:   07/09/18 105/66   06/19/18 116/74   06/08/18 111/49    Weight from Last 3 Encounters:   07/09/18 119.7 kg (264 lb)   06/07/18 124.7 kg (274 lb 14.6 oz)   10/16/13 132.1 kg (291 lb 3.6 oz)              We Performed the Following     PAC Visit Referral (For [facility] Only)     Michelle-Operative Worksheet - Kevin Total Laparoscopic Hysterectomy, bilateral, Salpingo-Oophorectomy, cystoscopy, possible open, possible lymph node dissection          Today's Medication Changes          These changes are accurate as of 7/9/18 11:59 PM.  If you have any questions, ask your nurse or doctor.               These medicines have changed or have updated prescriptions.        Dose/Directions    HYDROmorphone 4 MG tablet   Commonly known as:   DILAUDID   This may have changed:  reasons to take this   Used for:  Discitis        Dose:  4 mg   Take 1 tablet (4 mg) by mouth every 4 hours as needed   Quantity:  90 tablet   Refills:  0            Where to get your medicines      These medications were sent to [facility] [place], [place] - [address]  [address], [place] [address]     Phone:  [phone]     ferrous sulfate 325 (65 Fe) MG tablet                Primary Care Provider Office Phone # Fax #    [facility] [phone] [address]  [facility] [address]        Equal Access to Services     [facility] CenterSAURAV : Olvin Luis, waosmany styles, qaybally kaalmada brad, grace warner. So [facility] [phone].    ATENCIÓN: Si habla español, tiene a hayes disposición servicios gratuitos de asistencia lingüística. [facility] [phone].    We comply with applicable federal civil rights laws and [place] laws. We do not discriminate on the basis of race, color, national origin, age, disability, sex, sexual orientation, or gender identity.            Thank you!     Thank you for choosing [facility]  for your care. Our goal is always to provide you with excellent care. Hearing back from our patients is one way we can continue to improve our services. Please take a few minutes to complete the written survey that you may receive in the mail after your visit with us. Thank you!             Your Updated Medication List - Protect others around you: Learn how to safely use, store and throw away your medicines at www.disposemymeds.org.          This list is accurate as of 7/9/18 11:59 PM.  Always use your most recent med list.                   Brand Name Dispense Instructions for use Diagnosis    ACIDOPHILUS PO      Take 1 capsule by mouth daily        ferrous sulfate 325 (65 Fe) MG tablet    IRON    100 tablet    Take 1 tablet (325 mg)  by mouth 3 times daily (with meals)    Pancytopenia (H), Endometrial cancer (H), Excessive or frequent menstruation       GABAPENTIN PO      Take 1,200 mg by mouth 3 times daily 2 x 600 mg tab        HYDROmorphone 4 MG tablet    DILAUDID    90 tablet    Take 1 tablet (4 mg) by mouth every 4 hours as needed    Discitis       * KLOR-CON 8 MEQ CR tablet   Generic drug:  potassium chloride      Take 8 mEq by mouth daily in the afternoon        * KLOR-CON 8 MEQ CR tablet   Generic drug:  potassium chloride      Take 16 mEq by mouth 2 times daily AM and PM in addition to afternoon dose        LEVEMIR FLEXPEN/FLEXTOUCH 100 UNIT/ML injection   Generic drug:  insulin detemir      Inject 42 Units Subcutaneous 2 times daily        LEVOTHYROXINE SODIUM PO      Take 50 mcg by mouth daily        METFORMIN HCL PO      Take 1,000 mg by mouth 2 times daily (with meals)        * NOVOLOG SC      Inject 28 Units Subcutaneous daily (with breakfast)        * NOVOLOG SC      Inject 30 Units Subcutaneous daily (with lunch)        * NOVOLOG SC      Inject 28 Units Subcutaneous daily (with dinner)        sulfamethoxazole-trimethoprim 800-160 MG per tablet    BACTRIM DS/SEPTRA DS     Take 1 tablet by mouth 2 times daily        vitamin B complex with vitamin C Tabs tablet    STRESS TAB     Take 1 tablet by mouth daily.        * Notice:  This list has 5 medication(s) that are the same as other medications prescribed for you. Read the directions carefully, and ask your doctor or other care provider to review them with you.

## 2018-07-09 NOTE — NURSING NOTE
Patient Education Note - [facility]    Relevant Diagnosis:  Grade 1 endometrial cancer    Teaching Topic:  DaVinci Total Laparoscopic Hysterectomy, bilateral, Salpingo-Oophorectomy, cystoscopy, possible open, possible lymph node dissection    Person(s) involved in teaching:  Patient, spouse    Motivation Level:    Asks Questions:   Yes  Eager to Learn:  Yes  Cooperative:  Yes  Receptive (willing/able to accept information):  Yes  Comments:  None    Patient demonstrates understanding of the following:  Reason for the appointment, diagnosis and treatment plan:  Yes  Knowledge of proper use of medications and conditions for which they are ordered (with special attention to potential side effects or drug interactions):  Yes  Which situations necessitate calling provider and whom to contact:  Yes    Teaching Concerns:  No    Education/Instructional Materials Used/Given:     Before Your Surgery Booklet ([place])  Showering Before Surgery, CHG prep provided  Adult Pain Assessment Tool  Lymphedema: A Patient Resource Guide  Hysterectomy Guidelines   Home Care after Major Abdominal or Vaginal Surgery  [facility]  Accommodations Brochure   Phone numbers for [facility] and [address] Given to Patient    Ordered today: CT C/A/P, PAC Clinic referral    Post-op exam: 1-2 weeks after surgery, date is to be determined    Pre-op exam: PAC Clinic appointment, scheduling is in process    Time spent teaching with patient:  25 minutes    Surgical consent forms faxed to [facility] at fax number [phone].  Surgery scheduling staff at [facility] also notified.    Jason Le, RN, BSN, OCN  Oncology Care Coordinator  [facility]

## 2018-07-09 NOTE — LETTER
2018         RE: Fani Escobedo  [address] [place] [address]        Dear Colleague,    Thank you for referring your patient, Fani Escobedo, to the [facility]. Please see a copy of my visit note below.                            Consult Notes on Referred Patient    Date: 2018     Dr. Tip Ortega  RE: Fani Escobedo  : 1955  MARTHA: 2018     Fani Escobedo is a very pleasant 63 year old woman with a new diagnosis of FIGO grade 1 endometrioid adenocarcinoma. Given these findings she was subsequently sent to the Gynecologic Cancer Clinic for new patient consultation.     Brief History:  Ms. Escobedo is a female with pancytopenia and endometrial cancer-grade 1 on biopsy    1.  Patient has chronic thrombocytopenia going back to .  Over the years her thrombocytopenia has gotten worse.  She also has been anemic.   2.  Patient presented to the ER because of vaginal bleeding.  Multiple labs were done on 2018.     -WBC 3.3, hemoglobin 10.7, platelets of 41.  MCV of 99.   -INR of 1.18 and PTT of 35.     -TSH normal at 1.24.   -BMP normal.   -Peripheral blood smear revealed normochromic, normocytic anemia, leukopenia and thrombocytopenia.   3.  Multiple investigations were done in .     -Normal vitamin B12.   -Normal iron studies.   -Normal LDH.   -Normal SPEP.   -Bone marrow biopsy was done on 2018.  It reveals mildly hypercellular bone marrow with erythroid hyperplasia.  50 to 60% cellularity.  No evidence of myelodysplasia or malignancy.  Megakaryocytes are adequate in number and they are normal morphologically.  Flow cytometry is negative.   4.  D & C was done on 2018.  Pathology revealed endometrioid adenocarcinoma, FIGO grade1.   5.  Right upper quadrant ultrasound on 2018 reveals fatty liver and hepatomegaly.  Liver measures 21 cm. No liver cirrhosis.     18: D&C  SPECIMEN(S):   Endometrial curettings     FINAL DIAGNOSIS:   Uterus,  Endometrium: Curettage   - Endometrioid adenocarcinoma, provisional FIGO grade 1     6/19/2018: Saw Dr. Montana for pancytopenia, multifactorial. On chronic Bactrim due to MRSA and susana in back can cause pancytopenia. ? Immune thrombocytopenia    HPI: Bleeding intermitent in nature over past 2 years. Saw OB/GYN in [place] with attempted biopsy that failed due to sever pain. Returned to [place] for the summer, constant bleeding with large clots, more lightheadedness and fatigue. 10 lbs unintentional weight loss over past 3-4 mos.  US done with Dr. Ortega shows thickened endometrial stripe of 18 mm. Just spotting since D and C. Had been large golf ball to tennis ball sized clots.   Has DM had amputation of the right below the knee-blister in bottom of foot and it became infected. Sugars run around 200 usually.    Review of Systems:  Systemic           no weight changes; no fever; no chills; no night sweats; no appetite changes  Skin           no rashes, or lesions  Eye           no irritation; no changes in vision  Dilma-Laryngeal           no dysphagia; no hoarseness   Pulmonary    no cough; no shortness of breath  Cardiovascular    no chest pain; no palpitations  Gastrointestinal    no diarrhea; no constipation; no abdominal pain; no changes in bowel  habits; no blood in stool  Genitourinary   no urinary frequency; no urinary urgency; no dysuria; no pain; + abnormal vaginal discharge; no abnormal vaginal bleeding  Breast   no breast discharge; no breast changes; no breast pain  Musculoskeletal    no myalgias; no arthralgias; no back pain  Psychiatric           no depressed mood; no anxiety    Hematologic           no tender lymph nodes; no noticeable swellings or lumps   Endocrine    no hot flashes; no heat/cold intolerance         Neurological   no tremor; no numbness and tingling; no headaches; no difficulty  sleeping    I have reviewed and addressed the patient's review of symptoms for today's visit.     Past Medical  "History:  Past Medical History:   Diagnosis Date     Anemia     iron deficiency     Asthma      Charcot foot due to diabetes mellitus (H)     (R) foot -> BKA     COPD (chronic obstructive pulmonary disease) (H)      Delirium      Depression      Diabetes mellitus (H)     type II     Diskitis 5/2013     Hypothyroid      Obesity     bmi 46     USHA (obstructive sleep apnea)     does not use CPAP     Osteomyelitis (H)      Peripheral neuropathy      Sepsis (H)     MRSA bacteremia     Tobacco abuse      Multiple UTIs as a child-told she had surgeries for this as a child but states these were not done abdominally \"had to clear scar tissue\".    Past Surgical History:  Past Surgical History:   Procedure Laterality Date     AMPUTATE LEG BELOW KNEE  4/14/2012    Procedure:AMPUTATE LEG BELOW KNEE; right leg below knee amputation; Surgeon:WILMAR LUI; Location: OR     AMPUTATE TOE(S)  5/1/2013    Procedure: AMPUTATE TOE(S);  PARTIAL LEFT 3RD TOE AMPUTATION;  Surgeon: Juancarlos Gamez DPM;  Location:  OR     AMPUTATION      (L) 2nd toe     ANESTHESIA OUT OF OR MRI  6/11/2013    Procedure: ANESTHESIA OUT OF OR MRI;  MRI UNDER GENERAL ANESTHESIA;  Surgeon: Provider, Generic Anesthesia;  Location:  OR     ANESTHESIA OUT OF OR MRI  9/4/2013    Procedure: ANESTHESIA OUT OF OR MRI;  ANESTHESIA OUT OF OR MRI;  Surgeon: Provider, Generic Anesthesia;  Location:  OR     BONE MARROW BIOPSY, BONE SPECIMEN, NEEDLE/TROCAR N/A 6/7/2018    Procedure: BIOPSY BONE MARROW;  bone marrow biopsy;  Surgeon: Farzana Teixeira MD;  Location:  GI     DILATION AND CURETTAGE N/A 6/6/2018    Procedure: DILATION AND CURETTAGE;  DILATION AND CURETTAGE ;  Surgeon: Tip Ortega MD;  Location:  OR     EXPLORE SPINE, REMOVE HARDWARE, COMBINED  10/11/2013    Procedure: COMBINED EXPLORE SPINE, REMOVE HARDWARE;  EXPLORATION AND REVISION POSTERIOR THORACIC WOUND WITH WOUND VAC PLACEMENT.;  Surgeon: Thomas Ellis MD;  Location: " [facility] OR     FUSION SPINE ANTERIOR THORACIC ONE LEVEL  2013    Procedure: FUSION SPINE ANTERIOR THORACIC ONE LEVEL;  Right Thoracotomy For  T7-T8 Thorasic Vertebral Body Resection with Strut Graft, [place] Instrumentation T6-T9, BMP, Pyramesh and Intra-operative Neuro Monitoring ;  Surgeon: Thomas Ellis MD;  Location: [facility] OR     INCISION AND DRAINAGE RECTUM, COMBINED  2012    Procedure: COMBINED INCISION AND DRAINAGE RECTUM;  I&D ABSCESS RIGHT BUTTOCK (MRSA );  Surgeon: Christos Linton MD;  Location: [facility] OR     INCISION AND DRAINAGE SPINE, CLOSE WOUND, COMBINED  10/14/2013    Procedure: COMBINED INCISION AND DRAINAGE SPINE, CLOSE WOUND;  EXPLORATION/REVISION POSTERIOR THORACIC WOUND COMPLEX 15 CM. ;  Surgeon: Thomas Ellis MD;  Location: [facility]     IRRIGATION AND DEBRIDEMENT TRUNK, COMBINED  6/10/2012    Procedure: COMBINED IRRIGATION AND DEBRIDEMENT TRUNK;  Incision and drainage abscess right buttock;  Surgeon: Christos Linton MD;  Location: [facility] OR     OPTICAL TRACKING SYSTEM FUSION POSTERIOR SPINE THORACIC THREE+ LEVELS  2013    Procedure: OPTICAL TRACKING SYSTEM FUSION POSTERIOR SPINE THORACIC THREE+ LEVELS;  T4-T11 POSTERIOR LATERAL PEDICLE SCREW INSTRUMENTATION WITH IMAGE GUIDANCE AND NEURO-MONITORING;  Surgeon: Thomas Ellis MD;  Location: [facility] OR     Spinal surgeries x 2 complicated by MRSA    x 1-complicated by wound infection.    Health Maintenance:  Health Maintenance Due   Topic Date Due     EYE EXAM Q1 YEAR  1956     MICROALBUMIN Q1 YEAR  1956     PHQ-2 Q1 YR  1967     HIV SCREEN (SYSTEM ASSIGNED)  1973     HEPATITIS C SCREENING  1973     PAP SCREENING Q3 YR (SYSTEM ASSIGNED)  1976     MAMMO SCREEN Q2 YR (SYSTEM ASSIGNED)  2005     COLON CANCER SCREEN (SYSTEM ASSIGNED)  2005     ADVANCE DIRECTIVE PLANNING Q5 YRS  2010       Last Pap Smear: 5/3/17  Result:  nilm  She has had a history of  abnormal Pap smears.    Last Mammogram: Done at  3 years ago            Result: normal      She has had a history of abnormal mammograms.    Last Colonoscopy: Within 10 years           Result: normal except polyps per report.    Last Thyroid Screening:        TSH   Date Value Ref Range Status   2018 1.24 0.40 - 4.00 mU/L Final       Last Cholest Screening:        Cholesterol (mg/dL)   Date Value   2018 115     LDL Cholesterol Calculated (mg/dL)   Date Value   2018 57     HDL Cholesterol (mg/dL)   Date Value   2018 33 (A)     Triglycerides (mg/dL)   Date Value   2018 123     18 hgbA1c 6.2    Current Medications:   has a current medication list which includes the following prescription(s): aspirin, vitamin  b complex, calcium carb-vitamin d 500 mg Tanana-200 units, ferrous sulfate, gabapentin, hydromorphone, insulin aspart, insulin aspart, insulin aspart, insulin detemir, lactobacillus, levothyroxine sodium, metformin hcl, potassium chloride, potassium chloride, and sulfamethoxazole-trimethoprim.     Allergies:      Allergies   Allergen Reactions     Adhesive Tape      Cephalosporins Anaphylaxis     Naproxen Anaphylaxis     Penicillins      Augmentin Rash     Benadryl [Anti-Itch] Rash     Morphine Hcl Itching and Rash         Social History:  Social History   Substance Use Topics     Smoking status: Current Some Day Smoker     Years: 45.00     Smokeless tobacco: Never Used     Alcohol use Yes      Comment: occ       History   Drug Use No       Family History:   The patient's family history is notable for:    Paternal aunt  of cancer? Uterine or ovarian cancer?  No breast or colon cancers in family.    LABS:  Lab Results   Component Value Date    WBC 4.4 2018     Lab Results   Component Value Date    RBC 3.35 2018     Lab Results   Component Value Date    HGB 11.1 2018     Lab Results   Component Value Date    HCT 33.5 2018     Lab Results   Component Value  Date     06/19/2018     Lab Results   Component Value Date    MCH 33.1 06/19/2018     Lab Results   Component Value Date    MCHC 33.1 06/19/2018     Lab Results   Component Value Date    RDW 14.7 06/19/2018     Lab Results   Component Value Date    PLT 53 06/19/2018     Cr=.51  Physical Exam:   /66  Pulse 75  Temp 98.4  F (36.9  C) (Oral)  Resp 16  Wt 119.7 kg (264 lb)  SpO2 96%  BMI 37.03 kg/m2  Body mass index is 37.03 kg/(m^2).    General Appearance: healthy and alert, no distress, in wheelchair, leg not in prosthesis due to swelling of stump has not been wearing wrap.      HEENT:  no thyromegaly, no palpable nodules or masses        Cardiovascular: regular rate and rhythm, no gallops, rubs or murmurs     Respiratory: lungs clear, no rales, rhonchi or wheezes, normal diaphragmatic excursion    Musculoskeletal: extremities non tender and without edema    Skin: no lesions or rashes     Neurological: normal gait, no gross defects     Psychiatric: appropriate mood and affect                               Hematological: normal cervical, supraclavicular and inguinal lymph nodes     Gastrointestinal:       Abdomen morbidly obese, large pannus, firm, unable to determine if hernia present based on size and skin changes.    Genitourinary: External genitalia and urethral meatus appears normal.  Vagina is smooth without nodularity or masses.  Cervix appears normal and without lesions.  Bimanual exam reveal no masses, nodularity or fullness. Mobile uterus. Significant pain with moving patient to the exam table.      Assessment:    Fani Escobedo is a 63 year old woman with a new diagnosis of endometrial cancer; grade 1 endometrioid adenocarcinoma.     Plan:    1.)    Endometrial Cancer: Grade 1. Discussed options for treating grade 1 endometrial cancer including hormonal options vs. Surgery. Patient does not want to manage this hormonally with mirena IUD or oral progesterone. Explained that may be our only  option depending on surgical risk. Aware of high risk for infection due to DM. Risks associated with smoking and sleep apnea with anesthesia. Reviewed general surgical approach to endometrial cancer including hysterectomy, bilateral salpingo-oophorectomy, possible lymph node dissection to both treat the disease, determine extent of spread, and identify pathologic risk factors which may dictate need for post-operative adjuvant therapy. She desires surgical management. Discussed approach via laparotomy versus minimally invasive robotic-assisted approach. Patient is an appropriate candidate for robotic approach. Therefore, would recommend robotic-assisted hysterectomy, bilateral salpingo-oophorectomy with possible pelvic and para-aortic lymph node dissection if invasive disease identified on frozen pathology analysis. Risks, benefits and alternatives to proceed discussed in detail with the patient. Risks include but are not limited to bleeding, infection, possible injury to surrounding organs including bowel, bladder, ureter, need for second procedure/surgery related to complications from first procedure, postoperative medical complications. Also discussed specific risks related to robotic procedure including potential for conversion to laparotomy, vaginal cuff dehiscence, nerve injury. Consent for surgery obtained. Will obtain PAC visit to determine if patient ok for surgery. Last plts 53, may require plt transfusion prior to surgery. HX of MRSA+-contact isolation required.     2.) Genetic risk factors were assessed and the patient does not meet the qualifications for a referral at this time.      3.) Labs and/or tests ordered include:  Pre-op labs, EKG, PAC referral, CT CAP     4.) Health maintenance issues addressed today include DM    5.) Pre-op teaching was completed today.  Risks of surgery were discussed to include: bleeding, transfusion, infection, unintentional injury to surrounding  organs/structures.      Thank you for allowing us to participate in the care of your patient.       Sincerely,    Trudy Cortez MD    Department of Ob/Gyn and Women's Health  Division of Gynecologic Oncology  [facility]  [phone]      Patient Care Team:  Clinic, [facility] as PCP - General  CLINIC, [facility]        Again, thank you for allowing me to participate in the care of your patient.        Sincerely,        Trudy Cortez MD

## 2018-07-09 NOTE — NURSING NOTE
"Oncology Rooming Note    July 9, 2018 10:39 AM   Fani Escobedo is a 63 year old female who presents for:    Chief Complaint   Patient presents with     Oncology Clinic Visit     endometrial cancer     Initial Vitals: /66  Pulse 75  Temp 98.4  F (36.9  C) (Oral)  Resp 16  Wt 119.7 kg (264 lb)  SpO2 96%  BMI 37.03 kg/m2 Estimated body mass index is 37.03 kg/(m^2) as calculated from the following:    Height as of 6/5/18: 1.798 m (5' 10.8\").    Weight as of this encounter: 119.7 kg (264 lb). Body surface area is 2.45 meters squared.  Severe Pain (7) Comment: Data Unavailable   No LMP recorded. Patient is postmenopausal.  Allergies reviewed: Yes  Medications reviewed: Yes    Medications: MEDICATION REFILLS NEEDED TODAY. Provider was notified.  Pharmacy name entered into EPIC:    [facility] [place], [place] - [address] [place] [address] [address]  [id number] [place] - [address] [facility] - [place], [place] - [address] [facility] [place] - [address]        5 minutes for nursing intake (face to face time)     Mary Carbone RN              "

## 2018-07-19 ENCOUNTER — ANESTHESIA EVENT (OUTPATIENT)
Dept: SURGERY | Facility: CLINIC | Age: 63
End: 2018-07-19

## 2018-07-19 ENCOUNTER — ALLIED HEALTH/NURSE VISIT (OUTPATIENT)
Dept: SURGERY | Facility: CLINIC | Age: 63
End: 2018-07-19
Payer: COMMERCIAL

## 2018-07-19 ENCOUNTER — OFFICE VISIT (OUTPATIENT)
Dept: SURGERY | Facility: CLINIC | Age: 63
End: 2018-07-19
Payer: COMMERCIAL

## 2018-07-19 ENCOUNTER — RADIANT APPOINTMENT (OUTPATIENT)
Dept: CT IMAGING | Facility: CLINIC | Age: 63
End: 2018-07-19
Attending: OBSTETRICS & GYNECOLOGY
Payer: COMMERCIAL

## 2018-07-19 VITALS
BODY MASS INDEX: 38.83 KG/M2 | OXYGEN SATURATION: 99 % | HEART RATE: 83 BPM | SYSTOLIC BLOOD PRESSURE: 112 MMHG | DIASTOLIC BLOOD PRESSURE: 71 MMHG | HEIGHT: 69 IN | WEIGHT: 262.2 LBS | TEMPERATURE: 98 F

## 2018-07-19 DIAGNOSIS — Z01.818 PREOP GENERAL PHYSICAL EXAM: ICD-10-CM

## 2018-07-19 DIAGNOSIS — C54.1 ENDOMETRIAL CANCER (H): ICD-10-CM

## 2018-07-19 DIAGNOSIS — Z01.818 PREOP GENERAL PHYSICAL EXAM: Primary | ICD-10-CM

## 2018-07-19 LAB
ALBUMIN SERPL-MCNC: 3.3 G/DL (ref 3.4–5)
ALP SERPL-CCNC: 83 U/L (ref 40–150)
ALT SERPL W P-5'-P-CCNC: 31 U/L (ref 0–50)
ANION GAP SERPL CALCULATED.3IONS-SCNC: 7 MMOL/L (ref 3–14)
AST SERPL W P-5'-P-CCNC: 44 U/L (ref 0–45)
BILIRUB SERPL-MCNC: 0.9 MG/DL (ref 0.2–1.3)
BUN SERPL-MCNC: 8 MG/DL (ref 7–30)
CALCIUM SERPL-MCNC: 9 MG/DL (ref 8.5–10.1)
CHLORIDE SERPL-SCNC: 108 MMOL/L (ref 94–109)
CO2 SERPL-SCNC: 25 MMOL/L (ref 20–32)
CREAT SERPL-MCNC: 0.53 MG/DL (ref 0.52–1.04)
ERYTHROCYTE [DISTWIDTH] IN BLOOD BY AUTOMATED COUNT: 14.3 % (ref 10–15)
GFR SERPL CREATININE-BSD FRML MDRD: >90 ML/MIN/1.7M2
GLUCOSE SERPL-MCNC: 139 MG/DL (ref 70–99)
HCT VFR BLD AUTO: 35.8 % (ref 35–47)
HGB BLD-MCNC: 11.9 G/DL (ref 11.7–15.7)
INR PPP: 1.14 (ref 0.86–1.14)
MCH RBC QN AUTO: 33.2 PG (ref 26.5–33)
MCHC RBC AUTO-ENTMCNC: 33.2 G/DL (ref 31.5–36.5)
MCV RBC AUTO: 100 FL (ref 78–100)
NT-PROBNP SERPL-MCNC: 32 PG/ML (ref 0–125)
PLATELET # BLD AUTO: 40 10E9/L (ref 150–450)
POTASSIUM SERPL-SCNC: 3.9 MMOL/L (ref 3.4–5.3)
PROT SERPL-MCNC: 7.2 G/DL (ref 6.8–8.8)
RBC # BLD AUTO: 3.58 10E12/L (ref 3.8–5.2)
SODIUM SERPL-SCNC: 139 MMOL/L (ref 133–144)
WBC # BLD AUTO: 2.9 10E9/L (ref 4–11)

## 2018-07-19 RX ORDER — ETHYL CHLORIDE 100 %
AEROSOL, SPRAY (ML) TOPICAL ONCE
Status: COMPLETED | OUTPATIENT
Start: 2018-07-19 | End: 2018-07-19

## 2018-07-19 RX ORDER — IOPAMIDOL 755 MG/ML
135 INJECTION, SOLUTION INTRAVASCULAR ONCE
Status: COMPLETED | OUTPATIENT
Start: 2018-07-19 | End: 2018-07-19

## 2018-07-19 RX ORDER — IPRATROPIUM BROMIDE AND ALBUTEROL SULFATE 2.5; .5 MG/3ML; MG/3ML
3 SOLUTION RESPIRATORY (INHALATION) ONCE
Status: CANCELLED | OUTPATIENT
Start: 2018-07-19 | End: 2018-07-19

## 2018-07-19 RX ADMIN — Medication: at 13:59

## 2018-07-19 RX ADMIN — IOPAMIDOL 135 ML: 755 INJECTION, SOLUTION INTRAVASCULAR at 14:07

## 2018-07-19 ASSESSMENT — LIFESTYLE VARIABLES: TOBACCO_USE: 1

## 2018-07-19 ASSESSMENT — COPD QUESTIONNAIRES
COPD: 1
CAT_SEVERITY: MILD

## 2018-07-19 NOTE — H&P
"  Pre-Operative H & P     CC:  Preoperative exam to assess for increased cardiopulmonary risk while undergoing surgery and anesthesia.    Date of Encounter: 7/19/2018  Primary Care Physician[facility]  [facility]  Fani Escobedo is a 63 year old female who presents for pre-operative H & P in preparation for *** with  *** on { :[id number]} at {[employer]189206::\"[facility]\",\"[facility]\",\"[facility]\"}. ***    {   History obtained from:[id number]::\"History is obtained from the patient\"}.     Past Medical History  Past Medical History:   Diagnosis Date     Anemia     iron deficiency     Charcot foot due to diabetes mellitus (H)     (R) foot -> BKA     COPD (chronic obstructive pulmonary disease) (H)      Depression      Diabetes mellitus (H)     type II     Hypothyroid      Obesity     bmi 46     USHA (obstructive sleep apnea)     does not use CPAP     Osteomyelitis (H)      Peripheral neuropathy      Tobacco abuse        Past Surgical History  Past Surgical History:   Procedure Laterality Date     AMPUTATE LEG BELOW KNEE  4/14/2012    Procedure:AMPUTATE LEG BELOW KNEE; right leg below knee amputation; Surgeon:WILMAR LUI; Location: OR     AMPUTATE TOE(S)  5/1/2013    Procedure: AMPUTATE TOE(S);  PARTIAL LEFT 3RD TOE AMPUTATION;  Surgeon: Juancarlos Gamez DPM;  Location:  OR     AMPUTATION      (L) 2nd toe     ANESTHESIA OUT OF OR MRI  6/11/2013    Procedure: ANESTHESIA OUT OF OR MRI;  MRI UNDER GENERAL ANESTHESIA;  Surgeon: Provider, Generic Anesthesia;  Location:  OR     ANESTHESIA OUT OF OR MRI  9/4/2013    Procedure: ANESTHESIA OUT OF OR MRI;  ANESTHESIA OUT OF OR MRI;  Surgeon: Provider, Generic Anesthesia;  Location: [facility] OR     BONE MARROW BIOPSY, BONE SPECIMEN, NEEDLE/TROCAR N/A 6/7/2018    Procedure: BIOPSY BONE MARROW;  bone marrow biopsy;  Surgeon: Farzana Teixeira " MD David;  Location: [facility] GI     DILATION AND CURETTAGE N/A 6/6/2018    Procedure: DILATION AND CURETTAGE;  DILATION AND CURETTAGE ;  Surgeon: Tip Ortega MD;  Location: [facility] OR     EXPLORE SPINE, REMOVE HARDWARE, COMBINED  10/11/2013    Procedure: COMBINED EXPLORE SPINE, REMOVE HARDWARE;  EXPLORATION AND REVISION POSTERIOR THORACIC WOUND WITH WOUND VAC PLACEMENT.;  Surgeon: Thomas Ellis MD;  Location: [facility] OR     FUSION SPINE ANTERIOR THORACIC ONE LEVEL  9/5/2013    Procedure: FUSION SPINE ANTERIOR THORACIC ONE LEVEL;  Right Thoracotomy For  T7-T8 Thorasic Vertebral Body Resection with Strut Graft, Franklin Instrumentation T6-T9, BMP, Pyramesh and Intra-operative Neuro Monitoring ;  Surgeon: Thomas Ellis MD;  Location: [facility] OR     INCISION AND DRAINAGE RECTUM, COMBINED  6/11/2012    Procedure: COMBINED INCISION AND DRAINAGE RECTUM;  I&D ABSCESS RIGHT BUTTOCK (MRSA );  Surgeon: Christos Linton MD;  Location: [facility] OR     INCISION AND DRAINAGE SPINE, CLOSE WOUND, COMBINED  10/14/2013    Procedure: COMBINED INCISION AND DRAINAGE SPINE, CLOSE WOUND;  EXPLORATION/REVISION POSTERIOR THORACIC WOUND COMPLEX 15 CM. ;  Surgeon: Thomas Ellis MD;  Location: [facility] OR     IRRIGATION AND DEBRIDEMENT TRUNK, COMBINED  6/10/2012    Procedure: COMBINED IRRIGATION AND DEBRIDEMENT TRUNK;  Incision and drainage abscess right buttock;  Surgeon: Christos Linton MD;  Location:  OR     OPTICAL TRACKING SYSTEM FUSION POSTERIOR SPINE THORACIC THREE+ LEVELS  9/6/2013    Procedure: OPTICAL TRACKING SYSTEM FUSION POSTERIOR SPINE THORACIC THREE+ LEVELS;  T4-T11 POSTERIOR LATERAL PEDICLE SCREW INSTRUMENTATION WITH IMAGE GUIDANCE AND NEURO-MONITORING;  Surgeon: Thomas Ellis MD;  Location: [facility] OR       Hx of Blood transfusions/reactions: ***     Hx of abnormal bleeding or anti-platelet use: ***    Menstrual history: No LMP recorded. Patient is postmenopausal.: ***    Steroid use in the last year:  "***    Personal or FH with difficulty with Anesthesia:  ***    Prior to Admission Medications  Current Outpatient Prescriptions   Medication Sig Dispense Refill     B Complex Vitamins (VITAMIN  B COMPLEX) tablet Take 1 tablet by mouth daily.       ferrous sulfate (IRON) 325 (65 Fe) MG tablet Take 1 tablet (325 mg) by mouth 3 times daily (with meals) 100 tablet 1     GABAPENTIN PO Take 1,200 mg by mouth 3 times daily 2 x 600 mg tab       HYDROmorphone (DILAUDID) 4 MG tablet Take 1 tablet (4 mg) by mouth every 4 hours as needed (Patient taking differently: Take 4 mg by mouth every 4 hours as needed (\"Really bad pain\") ) 90 tablet 0     Insulin Aspart (NOVOLOG SC) Inject 28 Units Subcutaneous daily (with breakfast)       Insulin Aspart (NOVOLOG SC) Inject 30 Units Subcutaneous daily (with lunch)       Insulin Aspart (NOVOLOG SC) Inject 28 Units Subcutaneous daily (with dinner)       insulin detemir (LEVEMIR FLEXPEN/FLEXTOUCH) 100 UNIT/ML injection Inject 42 Units Subcutaneous 2 times daily       Lactobacillus (ACIDOPHILUS PO) Take 1 capsule by mouth daily       LEVOTHYROXINE SODIUM PO Take 50 mcg by mouth daily        METFORMIN HCL PO Take 1,000 mg by mouth 2 times daily (with meals)       potassium chloride (KLOR-CON) 8 MEQ CR tablet Take 16 mEq by mouth 2 times daily AM and PM in addition to afternoon dose       sulfamethoxazole-trimethoprim (BACTRIM DS/SEPTRA DS) 800-160 MG per tablet Take 1 tablet by mouth 2 times daily         Allergies  Allergies   Allergen Reactions     Adhesive Tape      Cephalosporins Anaphylaxis     Naproxen Anaphylaxis     Penicillins      Augmentin Rash     Benadryl [Anti-Itch] Rash     Morphine Hcl Itching and Rash       Social History  Social History     Social History     Marital status:      Spouse name: N/A     Number of children: N/A     Years of education: N/A     Occupational History     Not on file.     Social History Main Topics     Smoking status: Current Some Day Smoker "     Packs/day: 1.00     Years: 45.00     Smokeless tobacco: Never Used     Alcohol use Yes      Comment: occ     Drug use: No     Sexual activity: Not on file     Other Topics Concern     Not on file     Social History Narrative       Family History  No family history on file.    Review of Systems  Preprocedure Note     Last edited 07/19/18 4643 by Fredo Russell APRN CNS               Anesthesia Evaluation     . Pt has had prior anesthetic. Type: General and MAC           ROS/MED HX    ENT/Pulmonary:     (+)sleep apnea, tobacco use, Current use 1 PPD for 45 years packs/day  mild COPD, doesn't use CPAP , . .    Neurologic:     (+)neuropathy - Diabetic peripheral neuropathy,     Cardiovascular:  - neg cardiovascular ROS   (+) ----. : . . . :. . Previous cardiac testing date:results:date: results:ECG reviewed date:6/2018 results:NSR date: results:          METS/Exercise Tolerance:  1 - Eating, dressing   Hematologic: Comments: Pancytopenia requiring bone marrow biopsy    (+) Other Hematologic Disorder-Pancytopenia      Musculoskeletal:   (+) , , other musculoskeletal- right BKA      GI/Hepatic:        (-) GERD   Renal/Genitourinary:  - ROS Renal section negative       Endo: Comment: BMI 38    (+) type II DM Using insulin Diabetic complications: neuropathy, thyroid problem hypothyroidism, Obesity, .      Psychiatric:  - neg psychiatric ROS       Infectious Disease:   (+) MRSA,       Malignancy:         Other:    (+) No chance of pregnancy C-spine cleared: N/A, no H/O Chronic Pain,other significant disability Wheelchair bound                   Physical Exam  Normal systems: cardiovascular, pulmonary and dental    Airway   Mallampati: II  TM distance: >3 FB  Neck ROM: full    Dental     Cardiovascular   Rhythm and rate: regular and normal      Pulmonary (+) decreased breath sounds                  PAC Discussion and Assessment    ASA Classification: 3  Case is suitable for: [place]  Anesthetic techniques and  relevant risks discussed: GA  Invasive monitoring and risk discussed: Yes  Types:   Possibility and Risk of blood transfusion discussed: Yes  NPO instructions given:   Additional anesthetic preparation and risks discussed:   Needs early admission to pre-op area:   Other:     PAC Resident/NP Anesthesia Assessment:      Reviewed and Signed by PAC Mid-Level Provider/Resident  Mid-Level Provider/Resident: SHAUNA Rodriguez  Date: 7/9/2018  Time: 1100    Attending Anesthesiologist Anesthesia Assessment:        Anesthesiologist:   Date:   Time:   Pass/Fail:   Disposition:     PAC Pharmacist Assessment:        Pharmacist:   Date:   Time:                           .           The complete review of systems is negative other than noted in the HPI or here.                         0 lbs 0 oz  Data Unavailable   There is no height or weight on file to calculate BMI.       Physical Exam  Constitutional: Awake, alert, cooperative, no apparent distress, and appears stated age.  Eyes: Pupils equal, round and reactive to light, extra ocular muscles intact, sclera clear, conjunctiva normal.  HENT: Normocephalic, oral pharynx with moist mucus membranes, good dentition. No goiter appreciated.   Respiratory: Clear to auscultation bilaterally, no crackles or wheezing.  Cardiovascular: Regular rate and rhythm, normal S1 and S2, and no murmur noted.  Carotids +2, no bruits. No edema. Palpable pulses to radial  DP and PT arteries.   GI: Normal bowel sounds, soft, non-distended, non-tender, no masses palpated, no hepatosplenomegaly.  Surgical scars: ***  Lymph/Hematologic: No cervical lymphadenopathy and no supraclavicular lymphadenopathy.  Genitourinary:  ***  Skin: Warm and dry.  No rashes at anticipated surgical site.   Musculoskeletal: Full ROM of neck. There is no redness, warmth, or swelling of the joints. Gross motor strength is normal.    Neurologic: Awake, alert, oriented to name, place and time. Cranial nerves II-XII are grossly  "intact. Gait is normal.   Neuropsychiatric: Calm, cooperative. Normal affect.     Labs: (personally reviewed)  EKG: Personally reviewed but formal cardiology read pending: ***  Cardiac echo: ***  Stress test: ***  PFT's: ***    Outside records reviewed from: ***    ASSESSMENT and PLAN  Fani Escobedo is a 63 year old female scheduled to undergo ***. {He/She:072219} has the following specific operative considerations:   - RCRI : {PREOP REVISED CARDIAC INDEX (RCI):425538::\"No serious cardiac risks\"}.  *** risk of major adverse cardiac event.   - Anesthesia considerations:  Refer to PAC assessment in anesthesia records  - VTE risk:  - USHA # of risks ***/8 = ***  - Post-op delirium risk: ***  - If afib, [id number] score ***.  Risk category ***.    - Risk of PONV score = ***.  If > 2, anti-emetic intervention recommended.  - If on chronic opiates, morphine equivalent = *** (if > 60mg/24 hr--> needs pain team consult)    Patient was discussed with { PAC Providers:[id number]}.    Jimmy Landeros MD  Preoperative Assessment Center  [facility]  Phone: [phone]  Fax: [phone]  "

## 2018-07-19 NOTE — DISCHARGE INSTRUCTIONS

## 2018-07-19 NOTE — PATIENT INSTRUCTIONS
Preparing for Your Surgery      Name:  Fani Escobedo   MRN:  [id number]   :  1955   Today's Date:  2018     Arriving for surgery:  Surgery date:  18  Arrival time:  5:30AM  Please come to:       [facility] [address]  [address] [place]  [address]    -   parking is available in front of the hospital from 5:15 am to 8:00 pm    -  Stop at the Information Desk in the lobby    -   Inform the information person that you are here for surgery. An escort to 3c will be provided. If you would not like an escort, please proceed to 3C on the 3rd floor. [phone]     What can I eat or drink?  -  You may have solid food or milk products until 8 hours prior to your surgery. 11:30PM  -  You may have water, apple juice or 7up/Sprite until 2 hours prior to your surgery. 5:30AM    Which medicines can I take?  Stop Aspirin, vitamins and supplements one week prior to surgery.  Hold Ibuprofen and Naproxen for 24 hours prior to surgery.   -  Do NOT take these medications in the morning, the day of surgery:  Please do not take ferrous sulfate, metformin, and novolog insulin the morning of surgery.     -  Please take these medications the day of surgery:  Please take gabapentin, levothyroxine, Bactrim, and 34Units Levemir insulin the morning of surgery.  You may take dilaudid as needed the morning of surgery.     How do I prepare myself?  -  Take two showers: one the night before surgery; and one the morning of surgery.         Use Scrubcare or Hibiclens to wash from neck down.  You may use your own     shampoo and conditioner. No other hair products.   -  Do NOT use lotion, powder, deodorant, or antiperspirant the day of your surgery.  -  Do NOT wear any makeup, fingernail polish or jewelry.  -  Begin using Incentive Spirometer 1 week prior to surgery.  Use 4 times per day, up    to 5 breaths each time.  Bring Incentive Spirometer to hospital.  - Do not bring your own  medications to the hospital, except for inhalers and eye   drops.  -  Bring your ID and insurance card.    Questions or Concerns:  -If you have questions or concerns regarding the day of surgery, please call [phone].     -For questions after surgery please call your surgeons office.           AFTER YOUR SURGERY  Breathing exercises   Breathing exercises help you recover faster. Take deep breaths and let the air out slowly. This will:     Help you wake up after surgery.    Help prevent complications like pneumonia.  Preventing complications will help you go home sooner.   We may give you a breathing device (incentive spirometer) to encourage you to breathe deeply.   Nausea and vomiting   You may feel sick to your stomach after surgery; if so, let your nurse know.    Pain control:  After surgery, you may have pain. Our goal is to help you manage your pain. Pain medicine will help you feel comfortable enough to do activities that will help you heal.  These activities may include breathing exercises, walking and physical therapy.   To help your health care team treat your pain we will ask: 1) If you have pain  2) where it is located 3) describe your pain in your words  Methods of pain control include medications given by mouth, vein or by nerve block for some surgeries.  We may give you a pain control pump that will:  1) Deliver the medicine through a tube placed in your vein  2) Control the amount of medicine you receive  3) Allow you to push a button to deliver a dose of pain medicine  Sequential Compression Device (SCD) or Pneumo Boots:  You may need to wear SCD S on your legs or feet. These are wraps connected to a machine that pumps in air and releases it. The repeated pumping helps prevent blood clots from forming.   Using an Incentive Spirometer    An incentive spirometer is a device that helps you do deep breathing exercises. These exercises expand your lungs, aid in circulation, and help prevent pneumonia.  Deep breathing exercises also help you breathe better and improve the function of your lungs by:    Keeping your lungs clear    Strengthening your breathing muscles    Helping prevent respiratory complications or problems  The incentive spirometer gives you a way to take an active part in your care. A nurse or therapist will teach you breathing exercises. To do these exercises, you will breathe in through your mouth and not your nose. The incentive spirometer only works correctly if you breathe in through your mouth.    Steps to clear lungs  Step 1. Exhale normally. Then, inhale normally.    Relax and breathe out.  Step 2. Place your lips tightly around the mouthpiece.    Make sure the device is upright and not tilted.  Step 3. Inhale as much air as you can through the mouthpiece (don't breath through your nose).    Inhale slowly and deeply.    Hold your breath long enough to keep the balls or disk raised for at least 3 to 5 seconds, or as instructed by your healthcare provider.  Step 4. Repeat the exercise regularly.    Begin using the Incentive Spirometer one week prior to your surgery, 4 times per day-5 times each.

## 2018-07-19 NOTE — MR AVS SNAPSHOT
After Visit Summary   2018    Fani Escobedo    MRN: [id number]           Patient Information     Date Of Birth          1955        Visit Information        Provider Department      2018 12:00 PM Rn, [facility]        Care Instructions    Preparing for Your Surgery      Name:  Fani Escobedo   MRN:  [id number]   :  1955   Today's Date:  2018     Arriving for surgery:  Surgery date:  18  Arrival time:  5:30AM  Please come to:       [facility] [address]  [address] [place]  [address]    -   parking is available in front of the hospital from 5:15 am to 8:00 pm    -  Stop at the Information Desk in the lobby    -   Inform the information person that you are here for surgery. An escort to 3c will be provided. If you would not like an escort, please proceed to 3C on the 3rd floor. [phone]     What can I eat or drink?  -  You may have solid food or milk products until 8 hours prior to your surgery. 11:30PM  -  You may have water, apple juice or 7up/Sprite until 2 hours prior to your surgery. 5:30AM    Which medicines can I take?  Stop Aspirin, vitamins and supplements one week prior to surgery.  Hold Ibuprofen and Naproxen for 24 hours prior to surgery.   -  Do NOT take these medications in the morning, the day of surgery:  Please do not take ferrous sulfate, metformin, and novolog insulin the morning of surgery.     -  Please take these medications the day of surgery:  Please take gabapentin, levothyroxine, Bactrim, and 34Units Levemir insulin the morning of surgery.  You may take dilaudid as needed the morning of surgery.     How do I prepare myself?  -  Take two showers: one the night before surgery; and one the morning of surgery.         Use Scrubcare or Hibiclens to wash from neck down.  You may use your own     shampoo and conditioner. No other hair products.   -  Do NOT use lotion,  powder, deodorant, or antiperspirant the day of your surgery.  -  Do NOT wear any makeup, fingernail polish or jewelry.  -  Begin using Incentive Spirometer 1 week prior to surgery.  Use 4 times per day, up    to 5 breaths each time.  Bring Incentive Spirometer to hospital.  - Do not bring your own medications to the hospital, except for inhalers and eye   drops.  -  Bring your ID and insurance card.    Questions or Concerns:  -If you have questions or concerns regarding the day of surgery, please call [phone].     -For questions after surgery please call your surgeons office.           AFTER YOUR SURGERY  Breathing exercises   Breathing exercises help you recover faster. Take deep breaths and let the air out slowly. This will:     Help you wake up after surgery.    Help prevent complications like pneumonia.  Preventing complications will help you go home sooner.   We may give you a breathing device (incentive spirometer) to encourage you to breathe deeply.   Nausea and vomiting   You may feel sick to your stomach after surgery; if so, let your nurse know.    Pain control:  After surgery, you may have pain. Our goal is to help you manage your pain. Pain medicine will help you feel comfortable enough to do activities that will help you heal.  These activities may include breathing exercises, walking and physical therapy.   To help your health care team treat your pain we will ask: 1) If you have pain  2) where it is located 3) describe your pain in your words  Methods of pain control include medications given by mouth, vein or by nerve block for some surgeries.  We may give you a pain control pump that will:  1) Deliver the medicine through a tube placed in your vein  2) Control the amount of medicine you receive  3) Allow you to push a button to deliver a dose of pain medicine  Sequential Compression Device (SCD) or Pneumo Boots:  You may need to wear SCD S on your legs or feet. These are wraps connected to a  machine that pumps in air and releases it. The repeated pumping helps prevent blood clots from forming.   Using an Incentive Spirometer    An incentive spirometer is a device that helps you do deep breathing exercises. These exercises expand your lungs, aid in circulation, and help prevent pneumonia. Deep breathing exercises also help you breathe better and improve the function of your lungs by:    Keeping your lungs clear    Strengthening your breathing muscles    Helping prevent respiratory complications or problems  The incentive spirometer gives you a way to take an active part in your care. A nurse or therapist will teach you breathing exercises. To do these exercises, you will breathe in through your mouth and not your nose. The incentive spirometer only works correctly if you breathe in through your mouth.    Steps to clear lungs  Step 1. Exhale normally. Then, inhale normally.    Relax and breathe out.  Step 2. Place your lips tightly around the mouthpiece.    Make sure the device is upright and not tilted.  Step 3. Inhale as much air as you can through the mouthpiece (don't breath through your nose).    Inhale slowly and deeply.    Hold your breath long enough to keep the balls or disk raised for at least 3 to 5 seconds, or as instructed by your healthcare provider.  Step 4. Repeat the exercise regularly.    Begin using the Incentive Spirometer one week prior to your surgery, 4 times per day-5 times each.          Follow-ups after your visit        Your next 10 appointments already scheduled     Jul 19, 2018 12:00 PM CDT   (Arrive by 11:45 AM)   PAC RN ASSESSMENT with Jannet Pac Rn   [facility] ([facility])    [address]  4th [facility] [id number] [phone]            Jul 19, 2018 12:20 PM CDT   (Arrive by 12:05 PM)   PAC Anesthesia Consult with Jannet Pac Anesthesiologist   [facility] ([facility])    [address]  4th Floor  [facility] [address] [phone]            Jul 19, 2018 12:40 PM CDT   CT CHEST/ABDOMEN/PELVIS W CONTRAST with UCCT1   [facility] CT ([facility])    [address]  1st Floor  [facility] [id number] [phone]           Please bring any scans or X-rays taken at other hospitals, if similar tests were done. Also bring a list of your medicines, including vitamins, minerals and over-the-counter drugs. It is safest to leave personal items at home.  Be sure to tell your doctor:   If you have any allergies.   If there s any chance you are pregnant.   If you are breastfeeding.  How to prepare:   Do not eat or drink for 2 hours before your exam. If you need to take medicine, you may take it with small sips of water. (We may ask you to take liquid medicine as well.)   Please wear loose clothing, such as a sweat suit or jogging clothes. Avoid snaps, zippers and other metal. We may ask you to undress and put on a hospital gown.  Please arrive 30 minutes early for your CT. Once in the department you might be asked to drink water 15-20 minutes prior to your exam.  If indicated you may be asked to drink an oral contrast in advance of your CT.  If this is the case, the imaging team will let you know or be in contact with you prior to your appointment  Patients over 70 or patients with diabetes or kidney problems:   If you haven t had a blood test (creatinine test) within the last 30 days, the Cardiologist/Radiologist may require you to get this test prior to your exam.  If you have diabetes:   Continue to take your metformin medication on the day of your exam  If you have any questions, please call the Imaging Department where you will have your exam.            Aug 08, 2018   Procedure with Trudy Cortez MD   [facility] [place], Same Day Surgery (--)    [address] [address] [phone]            Aug  27, 2018 12:30 PM CDT   Post Op with Trudy Cortez MD   [facility] ([facility])    [address] [address] [place] [address] [phone]              Future tests that were ordered for you today     Open Future Orders        Priority Expected Expires Ordered    ABO/Rh type and screen Routine 7/19/2018 8/18/2018 7/19/2018    CBC with platelets Routine 7/19/2018 8/18/2018 7/19/2018    Comprehensive metabolic panel Routine 7/19/2018 8/18/2018 7/19/2018    INR Routine 7/19/2018 8/18/2018 7/19/2018            Who to contact     Please call your clinic at [phone] to:    Ask questions about your health    Make or cancel appointments    Discuss your medicines    Learn about your test results    Speak to your doctor            Additional Information About Your Visit        Care EveryWhere ID     This is your Care EveryWhere ID. This could be used by other organizations to access your [facility] medical records  [id number]         Blood Pressure from Last 3 Encounters:   07/19/18 112/71   07/09/18 105/66   06/19/18 116/74    Weight from Last 3 Encounters:   07/19/18 118.9 kg (262 lb 3.2 oz)   07/09/18 119.7 kg (264 lb)   06/07/18 124.7 kg (274 lb 14.6 oz)              Today, you had the following     No orders found for display         Today's Medication Changes          These changes are accurate as of 7/19/18 11:59 AM.  If you have any questions, ask your nurse or doctor.               These medicines have changed or have updated prescriptions.        Dose/Directions    HYDROmorphone 4 MG tablet   Commonly known as:  DILAUDID   This may have changed:  reasons to take this   Used for:  Discitis        Dose:  4 mg   Take 1 tablet (4 mg) by mouth every 4 hours as needed   Quantity:  90 tablet   Refills:  0                Primary Care Provider Office Phone # Fax #    [facility] [phone] [address]  [facility] [address]         Equal Access to Services     [facility] Hospital[facility] : Hadii aad ku hadcarlytex Rahulali, warubenda luqadaha, qaybta yovanibuzzgrace urrutia. [facility] [phone].    ATENCIÓN: Si habla español, tiene a hayes disposición servicios gratuitos de asistencia lingüística. Remiame al [phone].    We comply with applicable federal civil rights laws and [place] laws. We do not discriminate on the basis of race, color, national origin, age, disability, sex, sexual orientation, or gender identity.            Thank you!     Thank you for choosing [facility]  for your care. Our goal is always to provide you with excellent care. Hearing back from our patients is one way we can continue to improve our services. Please take a few minutes to complete the written survey that you may receive in the mail after your visit with us. Thank you!             Your Updated Medication List - Protect others around you: Learn how to safely use, store and throw away your medicines at www.disposemymeds.org.          This list is accurate as of 7/19/18 11:59 AM.  Always use your most recent med list.                   Brand Name Dispense Instructions for use Diagnosis    ACIDOPHILUS PO      Take 1 capsule by mouth daily        ferrous sulfate 325 (65 Fe) MG tablet    IRON    100 tablet    Take 1 tablet (325 mg) by mouth 3 times daily (with meals)    Pancytopenia (H), Endometrial cancer (H), Excessive or frequent menstruation       GABAPENTIN PO      Take 1,200 mg by mouth 3 times daily 2 x 600 mg tab        HYDROmorphone 4 MG tablet    DILAUDID    90 tablet    Take 1 tablet (4 mg) by mouth every 4 hours as needed    Discitis       KLOR-CON 8 MEQ CR tablet   Generic drug:  potassium chloride      Take 16 mEq by mouth 2 times daily AM and PM in addition to afternoon dose        LEVEMIR FLEXPEN/FLEXTOUCH 100 UNIT/ML injection   Generic drug:  insulin detemir      Inject 42 Units Subcutaneous 2  times daily        LEVOTHYROXINE SODIUM PO      Take 50 mcg by mouth daily        METFORMIN HCL PO      Take 1,000 mg by mouth 2 times daily (with meals)        * NOVOLOG SC      Inject 28 Units Subcutaneous daily (with breakfast)        * NOVOLOG SC      Inject 30 Units Subcutaneous daily (with lunch)        * NOVOLOG SC      Inject 28 Units Subcutaneous daily (with dinner)        sulfamethoxazole-trimethoprim 800-160 MG per tablet    BACTRIM DS/SEPTRA DS     Take 1 tablet by mouth 2 times daily        vitamin B complex with vitamin C Tabs tablet    STRESS TAB     Take 1 tablet by mouth daily.        * Notice:  This list has 3 medication(s) that are the same as other medications prescribed for you. Read the directions carefully, and ask your doctor or other care provider to review them with you.

## 2018-07-19 NOTE — ANESTHESIA PREPROCEDURE EVALUATION
Anesthesia Evaluation     . Pt has had prior anesthetic. Type: General and MAC           ROS/MED HX    ENT/Pulmonary:     (+)sleep apnea, tobacco use, Current use 1 PPD for 45 years packs/day  mild COPD, doesn't use CPAP , . .    Neurologic:     (+)neuropathy - Diabetic peripheral neuropathy,     Cardiovascular:  - neg cardiovascular ROS   (+) ----. : . . . :. . Previous cardiac testing date:results:date: results:ECG reviewed date:6/2018 results:NSR date: results:          METS/Exercise Tolerance:  1 - Eating, dressing   Hematologic: Comments: Pancytopenia requiring bone marrow biopsy    (+) Other Hematologic Disorder-Pancytopenia      Musculoskeletal:   (+) , , other musculoskeletal- right BKA      GI/Hepatic:        (-) GERD   Renal/Genitourinary:  - ROS Renal section negative       Endo: Comment: BMI 38    (+) type II DM Using insulin Diabetic complications: neuropathy, thyroid problem hypothyroidism, Obesity, .      Psychiatric:  - neg psychiatric ROS       Infectious Disease:   (+) MRSA,       Malignancy:         Other:    (+) No chance of pregnancy C-spine cleared: N/A, no H/O Chronic Pain,other significant disability Wheelchair bound                   Physical Exam  Normal systems: cardiovascular, pulmonary and dental    Airway   Mallampati: II  TM distance: >3 FB  Neck ROM: full    Dental     Cardiovascular   Rhythm and rate: regular and normal      Pulmonary (+) decreased breath sounds       Other findings:   Results for BELKYS SHELL (MRN [id number]) as of 7/20/2018 07:04    7/19/2018 13:54  Sodium: 139  Potassium: 3.9  Chloride: 108  Carbon Dioxide: 25  Urea Nitrogen: 8  Creatinine: 0.53  GFR Estimate: >90  GFR Estimate If Black: >90  Calcium: 9.0  Anion Gap: 7  Albumin: 3.3 (L)  Protein Total: 7.2  Bilirubin Total: 0.9  Alkaline Phosphatase: 83  ALT: 31  AST: 44  N-Terminal Pro Bnp: 32  Glucose: 139 (H)  WBC: 2.9 (L)  Hemoglobin: 11.9  Hematocrit: 35.8  Platelet Count: 40 (LL)  RBC Count: 3.58 (L)  MCV:  100  MCH: 33.2 (H)  MCHC: 33.2  RDW: 14.3  INR: 1.14  ABO: O  RH(D): Pos  Antibody Screen: Neg  Test Valid Only At: [facility]  Specimen Expires: 07/22/2018  Blood Bank Comment: Preadmit form rec...    7/19/2018 13:55  TEG WITH HEPARINASE FOR PLT INHIBITION: Rpt (A)  TEG WITH PLATELET INHIBITION: Rpt  Platelet Inhibition with ADP: 20  Platelet Inhibition with AA: 26           PAC Discussion and Assessment    ASA Classification: 3  Case is suitable for: [place]  Anesthetic techniques and relevant risks discussed: GA  Invasive monitoring and risk discussed: Yes  Types:   Possibility and Risk of blood transfusion discussed: Yes  NPO instructions given:   Additional anesthetic preparation and risks discussed:   Needs early admission to pre-op area:   Other:     PAC Resident/NP Anesthesia Assessment:  Fani Escobedo is a 62 yo male scheduled for DaVinci Assisted Total Laparoscopic Hysterectomy, Bilateral Salpingo-Oophorectomy, Cystoscopy, Possible Open, Possible Lymph Node Dissection on 8/8/2018 by Dr. Chavez in treatment of endometrial cancer      Previous anesthesia, last 6/5/2018 06/06/18; 0945; Mask Ventilation: Easy with oral airway; Ease of Intubation: Easy; Airway Size: 7;  Cuffed;  Oral;  Blade Type: Glidescope;  Blade Size: 3;  Place by: FNB CRNA;  Insertion Attempts: 1;  Secured at (cm)to lip: 22 cm;  Breath Sounds: Equal, clear and bilateral;  End Tidal CO2: Present;  Dentition: Intact, Unchanged;  Grade View of Cords: 1 (Right vocal cord noted to have red discoloration prior to intubation seen on videolaryngoscope.);  Airway Adjuncts:  [place] scope      1) Cardiac: No known cardiac diagnosis or symptoms. EKG 6/5 2018 SR, left axis deviation  2) Pulmonary: Current smoker and has smoked 1 PPD for 45 years.  COPD, not currently on inhalers. USHA, does not use CPAP  3) Heme: thrombocytopenia, Bone marrow biopsy was done on 06/07/2018.  It reveals mildly hypercellular bone marrow with erythroid  hyperplasia.  50 to 60% cellularity.  No evidence of myelodysplasia or malignancy.  Megakaryocytes are adequate in number and they are normal morphologically.  Flow cytometry is negative. Will order 4 units platelets for AM of surgery and TEG studies.  4) MSK: Right leg amputee, left toes amputated. W/C bound. Blister on left heel. Scabbed area on left thigh and coccyx.  5) Endo: DM II with last A1C 6/19/2018 at 6.3  6) Onc: new diagnosis of FIGO grade 1 endometrioid adenocarcinoma. D&C done 6/15/2018       Wheelchair bound  Feasible airway                 Reviewed and Signed by PAC Mid-Level Provider/Resident  Mid-Level Provider/Resident: Goldie Russell, APRN  Date: 7/9/2018  Time: 1100    Attending Anesthesiologist Anesthesia Assessment:  I have examined the patient and reviewed the medical record.  I have discussed the patient with the THOMAS and concur with her assessment  The patient is scheduled for robotic assisted VIC/BSO for endometrial CA    The patient has IDDM, improving control - Hgb A1c 6.3 6/2018 after 9.5 on 12/2017  The patient has diabetic peripheral neuropathy and has had right LE amputation and multiple toe amputations in the past  She has no known cardiac disease.  She denies chest pain but has limited activity (uses wheel chair).  She has had echocardiogram in 2012 which she was told was normal. (records unavailable)  She has known COPD for which she uses no medications and feels is stable.  She denies recent symptoms.  She has USHA but does not wear CPAP  She has known thrombocytopenia (Plt 53K) which is stable.  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  Recommend platelet transfusion as needed.       She has had bleeding with previous surgery but denies bleeding with ADLs    PE:  Obese female in WC.  In NAD.  Left leg amputation.  MPC 2, thick neck , 3 FBTMD,  Lungs clear.  CV  RRR without  murmur    Patient has risk factors for CADz but has time sensitive surgery.  Will check BNP and recommend post operative Troponin  Patient has bleeding history and known thrombocytopenia.  Will have 4 units platelets available for day of surgery.  Draw TEG on morning of surgery  Counseled patient to quit smoking  Final plan per attending anesthesiologist the day of surgery.              Reviewed and Signed by PAC Anesthesiologist  Anesthesiologist: Jimmy Landeros MD  Date: 7/19/2018  Time:   Pass/Fail:   Disposition:     PAC Pharmacist Assessment:        Pharmacist:   Date:   Time:                           .

## 2018-07-19 NOTE — H&P
Pre-Operative H & P     CC:  Preoperative exam to assess for increased cardiopulmonary risk while undergoing surgery and anesthesia.    Date of Encounter: 7/19/2018  Primary Care Physician:  Clinic, [facility]    Reason for visit:  Preop general physical exam  Endometrial cancer      HPI  Fani Escobedo is a 63 year old female who presents for pre-operative H & P in preparation for DaVinci Assisted Total Laparoscopic Hysterectomy, Bilateral Salpingo-Oophorectomy, Cystoscopy, Possible Open, Possible Lymph Node Dissection on 8/8/2018 by Dr. Chavez in treatment of endometrial cancer at [facility].     History is obtained from the patient.   New diagnosis of FIGO grade 1 endometrioid adenocarcinoma. D&C done 6/15/2018 and she tolerated that procedure well. She also has COPD, USHA DM II, hypothyroidism, and morbid obesity.        Past Medical History  Past Medical History:   Diagnosis Date     Anemia     iron deficiency     Charcot foot due to diabetes mellitus (H)     (R) foot -> BKA     COPD (chronic obstructive pulmonary disease) (H)      Depression      Diabetes mellitus (H)     type II     Hypothyroid      Obesity     bmi 46     USHA (obstructive sleep apnea)     does not use CPAP     Osteomyelitis (H)      Peripheral neuropathy      Tobacco abuse        Past Surgical History  Past Surgical History:   Procedure Laterality Date     AMPUTATE LEG BELOW KNEE  4/14/2012    Procedure:AMPUTATE LEG BELOW KNEE; right leg below knee amputation; Surgeon:WILMAR LUI; Location:[facility] OR     AMPUTATE TOE(S)  5/1/2013    Procedure: AMPUTATE TOE(S);  PARTIAL LEFT 3RD TOE AMPUTATION;  Surgeon: Juancarlos Gamez DPM;  Location: [facility] OR     AMPUTATION      (L) 2nd toe     ANESTHESIA OUT OF OR MRI  6/11/2013    Procedure: ANESTHESIA OUT OF OR MRI;  MRI UNDER GENERAL ANESTHESIA;  Surgeon: Provider, Generic Anesthesia;  Location: [facility] OR     ANESTHESIA OUT OF OR MRI  9/4/2013     Procedure: ANESTHESIA OUT OF OR MRI;  ANESTHESIA OUT OF OR MRI;  Surgeon: Provider, Generic Anesthesia;  Location: [facility] OR     BONE MARROW BIOPSY, BONE SPECIMEN, NEEDLE/TROCAR N/A 6/7/2018    Procedure: BIOPSY BONE MARROW;  bone marrow biopsy;  Surgeon: Farzana Teixeira MD;  Location:  GI     DILATION AND CURETTAGE N/A 6/6/2018    Procedure: DILATION AND CURETTAGE;  DILATION AND CURETTAGE ;  Surgeon: Tip Ortega MD;  Location:  OR     EXPLORE SPINE, REMOVE HARDWARE, COMBINED  10/11/2013    Procedure: COMBINED EXPLORE SPINE, REMOVE HARDWARE;  EXPLORATION AND REVISION POSTERIOR THORACIC WOUND WITH WOUND VAC PLACEMENT.;  Surgeon: Thomas Ellis MD;  Location:  OR     FUSION SPINE ANTERIOR THORACIC ONE LEVEL  9/5/2013    Procedure: FUSION SPINE ANTERIOR THORACIC ONE LEVEL;  Right Thoracotomy For  T7-T8 Thorasic Vertebral Body Resection with Strut Graft, [place] Instrumentation T6-T9, BMP, Pyramesh and Intra-operative Neuro Monitoring ;  Surgeon: Thomas Ellis MD;  Location:  OR     INCISION AND DRAINAGE RECTUM, COMBINED  6/11/2012    Procedure: COMBINED INCISION AND DRAINAGE RECTUM;  I&D ABSCESS RIGHT BUTTOCK (MRSA );  Surgeon: Christos Linton MD;  Location:  OR     INCISION AND DRAINAGE SPINE, CLOSE WOUND, COMBINED  10/14/2013    Procedure: COMBINED INCISION AND DRAINAGE SPINE, CLOSE WOUND;  EXPLORATION/REVISION POSTERIOR THORACIC WOUND COMPLEX 15 CM. ;  Surgeon: Thomas Ellis MD;  Location:  OR     IRRIGATION AND DEBRIDEMENT TRUNK, COMBINED  6/10/2012    Procedure: COMBINED IRRIGATION AND DEBRIDEMENT TRUNK;  Incision and drainage abscess right buttock;  Surgeon: Christos Linton MD;  Location:  OR     OPTICAL TRACKING SYSTEM FUSION POSTERIOR SPINE THORACIC THREE+ LEVELS  9/6/2013    Procedure: OPTICAL TRACKING SYSTEM FUSION POSTERIOR SPINE THORACIC THREE+ LEVELS;  T4-T11 POSTERIOR LATERAL PEDICLE SCREW INSTRUMENTATION WITH IMAGE GUIDANCE AND  "NEURO-MONITORING;  Surgeon: Thomas Ellis MD;  Location: [facility] OR       Hx of Blood transfusions/reactions: yes, no reactions      Hx of abnormal bleeding or anti-platelet use: thrombocytopenia      Menstrual history: No LMP recorded. Patient is postmenopausal.:     Steroid use in the last year: none    Personal or FH with difficulty with Anesthesia:  None        Prior to Admission Medications  Current Outpatient Prescriptions   Medication Sig Dispense Refill     B Complex Vitamins (VITAMIN  B COMPLEX) tablet Take 1 tablet by mouth daily.       ferrous sulfate (IRON) 325 (65 Fe) MG tablet Take 1 tablet (325 mg) by mouth 3 times daily (with meals) 100 tablet 1     GABAPENTIN PO Take 1,200 mg by mouth 3 times daily 2 x 600 mg tab       Lactobacillus (ACIDOPHILUS PO) Take 1 capsule by mouth daily       LEVOTHYROXINE SODIUM PO Take 50 mcg by mouth daily        METFORMIN HCL PO Take 1,000 mg by mouth 2 times daily (with meals)       potassium chloride (KLOR-CON) 8 MEQ CR tablet Take 16 mEq by mouth 2 times daily AM and PM in addition to afternoon dose       sulfamethoxazole-trimethoprim (BACTRIM DS/SEPTRA DS) 800-160 MG per tablet Take 1 tablet by mouth 2 times daily       HYDROmorphone (DILAUDID) 4 MG tablet Take 1 tablet (4 mg) by mouth every 4 hours as needed (Patient taking differently: Take 4 mg by mouth every 4 hours as needed (\"Really bad pain\") ) 90 tablet 0     Insulin Aspart (NOVOLOG SC) Inject 28 Units Subcutaneous daily (with breakfast)       Insulin Aspart (NOVOLOG SC) Inject 30 Units Subcutaneous daily (with lunch)       Insulin Aspart (NOVOLOG SC) Inject 28 Units Subcutaneous daily (with dinner)       insulin detemir (LEVEMIR FLEXPEN/FLEXTOUCH) 100 UNIT/ML injection Inject 42 Units Subcutaneous 2 times daily         Allergies  Allergies   Allergen Reactions     Adhesive Tape      Cephalosporins Anaphylaxis     Naproxen Anaphylaxis     Penicillins      Augmentin Rash     Benadryl [Anti-Itch] Rash "     Morphine Hcl Itching and Rash       Social History  Social History     Social History     Marital status:      Spouse name: N/A     Number of children: N/A     Years of education: N/A     Occupational History     Not on file.     Social History Main Topics     Smoking status: Current Some Day Smoker     Packs/day: 1.00     Years: 45.00     Smokeless tobacco: Never Used     Alcohol use Yes      Comment: occ     Drug use: No     Sexual activity: Not on file     Other Topics Concern     Not on file     Social History Narrative       Family History  No family history on file.          Anesthesia Evaluation     . Pt has had prior anesthetic. Type: General and MAC           ROS/MED HX    ENT/Pulmonary:     (+)sleep apnea, tobacco use, Current use 1 PPD for 45 years packs/day  mild COPD, doesn't use CPAP , . .    Neurologic:     (+)neuropathy - Diabetic peripheral neuropathy,     Cardiovascular:  - neg cardiovascular ROS   (+) ----. : . . . :. . Previous cardiac testing date:results:date: results:ECG reviewed date:6/2018 results:NSR date: results:          METS/Exercise Tolerance:  1 - Eating, dressing   Hematologic: Comments: Pancytopenia requiring bone marrow biopsy    (+) Other Hematologic Disorder-Pancytopenia      Musculoskeletal:   (+) , , other musculoskeletal- right BKA      GI/Hepatic:        (-) GERD   Renal/Genitourinary:  - ROS Renal section negative       Endo: Comment: BMI 38    (+) type II DM Using insulin Diabetic complications: neuropathy, thyroid problem hypothyroidism, Obesity, .      Psychiatric:  - neg psychiatric ROS       Infectious Disease:   (+) MRSA,       Malignancy:         Other:    (+) No chance of pregnancy C-spine cleared: N/A, no H/O Chronic Pain,other significant disability Wheelchair bound             Physical Exam  Normal systems: cardiovascular, pulmonary and dental    Airway   Mallampati: II  TM distance: >3 FB  Neck ROM: full    Dental   Normal    Cardiovascular   Rhythm  "and rate: regular and normal      Pulmonary (+) decreased breath sounds         The complete review of systems is negative other than noted in the HPI or here.   Temp: 98  F (36.7  C)   BP: 112/71 Pulse: 83     SpO2: 99 %         262 lbs 3.2 oz  5' 8.5\"   Body mass index is 39.29 kg/(m^2).       Physical Exam  Constitutional: Awake, alert, cooperative, no apparent distress, and appears stated age.  Eyes: Pupils equal, round and reactive to light, extra ocular muscles intact, sclera clear, conjunctiva normal.  HENT: Normocephalic, oral pharynx with moist mucus membranes, good dentition. No goiter appreciated.   Respiratory: Clear to auscultation bilaterally, no crackles or wheezing.  Cardiovascular: Regular rate and rhythm, normal S1 and S2, and no murmur noted.  Carotids +2, no bruits. Palpable pulses to radial  DP and PT arteries.   GI: Normal bowel sounds, soft, non-distended, non-tender, no masses palpated, no hepatosplenomegaly.    Lymph/Hematologic: No cervical lymphadenopathy and no supraclavicular lymphadenopathy.  Genitourinary:  Deferred   Skin: Warm and dry.  No rashes at anticipated surgical site. Left heel ulcers  Musculoskeletal: Full ROM of neck. There is no redness, warmth, or swelling of the joints. Right BKA. Left toe amputee, left superficial scab left thigh, coccyx superficial area  Neurologic: Awake, alert, oriented to name, place and time. Cranial nerves II-XII are grossly intact.   Neuropsychiatric: Calm, cooperative. Normal affect.     Labs: (personally reviewed)  Results for BELKYS SHELL (MRN [id number]) as of 7/20/2018 07:04   Ref. Range 7/19/2018 13:54 7/19/2018 13:55   Sodium Latest Ref Range: 133 - 144 mmol/L 139    Potassium Latest Ref Range: 3.4 - 5.3 mmol/L 3.9    Chloride Latest Ref Range: 94 - 109 mmol/L 108    Carbon Dioxide Latest Ref Range: 20 - 32 mmol/L 25    Urea Nitrogen Latest Ref Range: 7 - 30 mg/dL 8    Creatinine Latest Ref Range: 0.52 - 1.04 mg/dL 0.53    GFR Estimate " Latest Ref Range: >60 mL/min/1.7m2 >90    GFR Estimate If Black Latest Ref Range: >60 mL/min/1.7m2 >90    Calcium Latest Ref Range: 8.5 - 10.1 mg/dL 9.0    Anion Gap Latest Ref Range: 3 - 14 mmol/L 7    Albumin Latest Ref Range: 3.4 - 5.0 g/dL 3.3 (L)    Protein Total Latest Ref Range: 6.8 - 8.8 g/dL 7.2    Bilirubin Total Latest Ref Range: 0.2 - 1.3 mg/dL 0.9    Alkaline Phosphatase Latest Ref Range: 40 - 150 U/L 83    ALT Latest Ref Range: 0 - 50 U/L 31    AST Latest Ref Range: 0 - 45 U/L 44    N-Terminal Pro Bnp Latest Ref Range: 0 - 125 pg/mL 32    Glucose Latest Ref Range: 70 - 99 mg/dL 139 (H)    WBC Latest Ref Range: 4.0 - 11.0 10e9/L 2.9 (L)    Hemoglobin Latest Ref Range: 11.7 - 15.7 g/dL 11.9    Hematocrit Latest Ref Range: 35.0 - 47.0 % 35.8    Platelet Count Latest Ref Range: 150 - 450 10e9/L 40 (LL)    RBC Count Latest Ref Range: 3.8 - 5.2 10e12/L 3.58 (L)    MCV Latest Ref Range: 78 - 100 fl 100    MCH Latest Ref Range: 26.5 - 33.0 pg 33.2 (H)    MCHC Latest Ref Range: 31.5 - 36.5 g/dL 33.2    RDW Latest Ref Range: 10.0 - 15.0 % 14.3    INR Latest Ref Range: 0.86 - 1.14  1.14    TEG WITH HEPARINASE FOR PLT INHIBITION Unknown  Rpt (A)   TEG WITH PLATELET INHIBITION Unknown  Rpt   Platelet Inhibition with ADP Latest Units: %  20   Platelet Inhibition with AA Latest Units: %  26   ABO Unknown O    RH(D) Unknown Pos    Antibody Screen Unknown Neg    Test Valid Only At Latest Units:     [employer]    Specimen Expires Unknown 2018    Blood Bank Comment Unknown Preadmit form rec...        EKG: Personally reviewed but formal cardiology read pendin2018 SR      Outside records reviewed from: care everywhere      ASSESSMENT and PLAN  Fani Escobedo is a 63 year old female scheduled to undergo DaVinci Assisted Total Laparoscopic Hysterectomy, Bilateral Salpingo-Oophorectomy, Cystoscopy, Possible Open, Possible Lymph Node Dissection on 2018 by Dr. Chavez in treatment of endometrial cancer.  She has the following specific operative considerations:   - RCRI : High risk surgery (>5% cardiac complication risk).  6.6% risk of major adverse cardiac event.   - Anesthesia considerations:  Refer to PAC assessment in anesthesia records  - VTE risk: 1.8%  - USHA # of risks  = known USHA without CPAP  - Post-op delirium risk: high risk due to age   - Risk of PONV score = 2.  If > 2, anti-emetic intervention recommended.       Previous anesthesia, last 6/5/2018, without complications    06/06/18; 0945; Mask Ventilation: Easy with oral airway; Ease of Intubation: Easy; Airway Size: 7;  Cuffed;  Oral;  Blade Type: Glidescope;  Blade Size: 3;  Place by: FNB CRNA;  Insertion Attempts: 1;  Secured at (cm)to lip: 22 cm;  Breath Sounds: Equal, clear and bilateral;  End Tidal CO2: Present;  Dentition: Intact, Unchanged;  Grade View of Cords: 1 (Right vocal cord noted to have red discoloration prior to intubation seen on videolaryngoscope.);  Airway Adjuncts:  [place] scope      1) Cardiac: No known cardiac diagnosis or symptoms. EKG 6/5 2018 SR, left axis deviation  2) Pulmonary: Current smoker and has smoked 1 PPD for 45 years.  COPD, not currently on inhalers. USHA, does not use CPAP  3) Heme: thrombocytopenia, Bone marrow biopsy was done on 06/07/2018.  It reveals mildly hypercellular bone marrow with erythroid hyperplasia.  50 to 60% cellularity.  No evidence of myelodysplasia or malignancy.  Megakaryocytes are adequate in number and they are normal morphologically.  Flow cytometry is negative. Will order 4 units platelets for AM of surgery and TEG studies.  4) MSK: Right leg amputee, left toes amputated. W/C bound. Blister on left heel. Scabbed area on left thigh and coccyx.  5) Endo: DM II with last A1C 6/19/2018 at 6.3  6) Onc: new diagnosis of FIGO grade 1 endometrioid adenocarcinoma. D&C done 6/15/2018    Wheelchair bound  Feasible airway         I spent 30 minutes with patient, greater than 50% educating on preop  meds, counseling on anesthesia and coordinating care for endometrial cancer          I spent 30 minutes with patient, greater than 50% educating on preop meds, counseling on anesthesia and coordinating care for endometrial cancer  Will have 4 units platelets available for surgery  Pt optimized for surgery. AVS with information on surgery time/arrival time, meds and NPO status given by nursing staff      Patient was discussed with Dr Landeros.    SHAUNA Gutiérrez CNS  Preoperative Assessment Center  [facility]  [facility] and [facility]  Phone: [phone]  Fax: [phone]

## 2018-07-20 LAB
ANGLE RATE OF CLOT STRENGTH: 65.5 DEGREES (ref 53–72)
CI HYPOCOAGULATION INDEX: 1.2 RATIO (ref 0–3)
G ACTUAL CLOT STRENGTH: 4.8 KD/SC (ref 4.5–11)
K TIME TO SPEC CLOT STRENGTH: 2 MINUTE (ref 1–3)
LY30 LYSIS AT 30 MINUTES: 0 % (ref 0–8)
LY60 LYSIS AT 60 MINUTES: 1.8 % (ref 0–15)
MA MAXIMUM CLOT STRENGTH: 48.9 MM (ref 50–70)
PLATELET INHIBITION WITH AA: 26 %
PLATELET INHIBITION WITH ADP: 20 %
R TIME UNTIL CLOT FORMS: 5.6 MINUTE (ref 5–10)

## 2018-07-20 NOTE — H&P
"  Pre-Operative H & P     CC:  Preoperative exam to assess for increased cardiopulmonary risk while undergoing surgery and anesthesia.    Date of Encounter: 7/20/2018  Primary Care Physician:  [facility]  Fani Escobedo is a 63 year old female who presents for pre-operative H & P in preparation for *** with  *** on { :[id number]} at {[facility][id number]::\"[facility]\",\"[facility]\",\"[facility]\"}. ***    {   History obtained from:[id number]::\"History is obtained from the patient\"}.     Past Medical History  Past Medical History:   Diagnosis Date     Anemia     iron deficiency     Charcot foot due to diabetes mellitus (H)     (R) foot -> BKA     COPD (chronic obstructive pulmonary disease) (H)      Depression      Diabetes mellitus (H)     type II     Hypothyroid      Obesity     bmi 46     USHA (obstructive sleep apnea)     does not use CPAP     Osteomyelitis (H)      Peripheral neuropathy      Tobacco abuse        Past Surgical History  Past Surgical History:   Procedure Laterality Date     AMPUTATE LEG BELOW KNEE  4/14/2012    Procedure:AMPUTATE LEG BELOW KNEE; right leg below knee amputation; Surgeon:WILMAR LUI; Location: OR     AMPUTATE TOE(S)  5/1/2013    Procedure: AMPUTATE TOE(S);  PARTIAL LEFT 3RD TOE AMPUTATION;  Surgeon: Juancarlos Gamez DPM;  Location:  OR     AMPUTATION      (L) 2nd toe     ANESTHESIA OUT OF OR MRI  6/11/2013    Procedure: ANESTHESIA OUT OF OR MRI;  MRI UNDER GENERAL ANESTHESIA;  Surgeon: Provider, Generic Anesthesia;  Location:  OR     ANESTHESIA OUT OF OR MRI  9/4/2013    Procedure: ANESTHESIA OUT OF OR MRI;  ANESTHESIA OUT OF OR MRI;  Surgeon: Provider, Generic Anesthesia;  Location: [facility] OR     BONE MARROW BIOPSY, BONE SPECIMEN, NEEDLE/TROCAR N/A 6/7/2018    Procedure: BIOPSY BONE MARROW;  bone marrow biopsy;  Surgeon: Farzana Teixeira " MD David;  Location: [facility] GI     DILATION AND CURETTAGE N/A 6/6/2018    Procedure: DILATION AND CURETTAGE;  DILATION AND CURETTAGE ;  Surgeon: Tip Ortega MD;  Location: [facility] OR     EXPLORE SPINE, REMOVE HARDWARE, COMBINED  10/11/2013    Procedure: COMBINED EXPLORE SPINE, REMOVE HARDWARE;  EXPLORATION AND REVISION POSTERIOR THORACIC WOUND WITH WOUND VAC PLACEMENT.;  Surgeon: Thomas Ellis MD;  Location: [facility] OR     FUSION SPINE ANTERIOR THORACIC ONE LEVEL  9/5/2013    Procedure: FUSION SPINE ANTERIOR THORACIC ONE LEVEL;  Right Thoracotomy For  T7-T8 Thorasic Vertebral Body Resection with Strut Graft, [place] Instrumentation T6-T9, BMP, Pyramesh and Intra-operative Neuro Monitoring ;  Surgeon: Thomas Ellis MD;  Location: [facility] OR     INCISION AND DRAINAGE RECTUM, COMBINED  6/11/2012    Procedure: COMBINED INCISION AND DRAINAGE RECTUM;  I&D ABSCESS RIGHT BUTTOCK (MRSA );  Surgeon: Christos Linton MD;  Location: [facility] OR     INCISION AND DRAINAGE SPINE, CLOSE WOUND, COMBINED  10/14/2013    Procedure: COMBINED INCISION AND DRAINAGE SPINE, CLOSE WOUND;  EXPLORATION/REVISION POSTERIOR THORACIC WOUND COMPLEX 15 CM. ;  Surgeon: Thomas Ellis MD;  Location: [facility] OR     IRRIGATION AND DEBRIDEMENT TRUNK, COMBINED  6/10/2012    Procedure: COMBINED IRRIGATION AND DEBRIDEMENT TRUNK;  Incision and drainage abscess right buttock;  Surgeon: Christos Linton MD;  Location:  OR     OPTICAL TRACKING SYSTEM FUSION POSTERIOR SPINE THORACIC THREE+ LEVELS  9/6/2013    Procedure: OPTICAL TRACKING SYSTEM FUSION POSTERIOR SPINE THORACIC THREE+ LEVELS;  T4-T11 POSTERIOR LATERAL PEDICLE SCREW INSTRUMENTATION WITH IMAGE GUIDANCE AND NEURO-MONITORING;  Surgeon: Thomas Ellis MD;  Location: [facility] OR       Hx of Blood transfusions/reactions: ***     Hx of abnormal bleeding or anti-platelet use: ***    Menstrual history: No LMP recorded. Patient is postmenopausal.: ***    Steroid use in the last year:  "***    Personal or FH with difficulty with Anesthesia:  ***    Prior to Admission Medications  Current Outpatient Prescriptions   Medication Sig Dispense Refill     B Complex Vitamins (VITAMIN  B COMPLEX) tablet Take 1 tablet by mouth daily.       ferrous sulfate (IRON) 325 (65 Fe) MG tablet Take 1 tablet (325 mg) by mouth 3 times daily (with meals) 100 tablet 1     GABAPENTIN PO Take 1,200 mg by mouth 3 times daily 2 x 600 mg tab       HYDROmorphone (DILAUDID) 4 MG tablet Take 1 tablet (4 mg) by mouth every 4 hours as needed (Patient taking differently: Take 4 mg by mouth every 4 hours as needed (\"Really bad pain\") ) 90 tablet 0     Insulin Aspart (NOVOLOG SC) Inject 28 Units Subcutaneous daily (with breakfast)       Insulin Aspart (NOVOLOG SC) Inject 30 Units Subcutaneous daily (with lunch)       Insulin Aspart (NOVOLOG SC) Inject 28 Units Subcutaneous daily (with dinner)       insulin detemir (LEVEMIR FLEXPEN/FLEXTOUCH) 100 UNIT/ML injection Inject 42 Units Subcutaneous 2 times daily       Lactobacillus (ACIDOPHILUS PO) Take 1 capsule by mouth daily       LEVOTHYROXINE SODIUM PO Take 50 mcg by mouth daily        METFORMIN HCL PO Take 1,000 mg by mouth 2 times daily (with meals)       potassium chloride (KLOR-CON) 8 MEQ CR tablet Take 16 mEq by mouth 2 times daily AM and PM in addition to afternoon dose       sulfamethoxazole-trimethoprim (BACTRIM DS/SEPTRA DS) 800-160 MG per tablet Take 1 tablet by mouth 2 times daily         Allergies  Allergies   Allergen Reactions     Adhesive Tape      Cephalosporins Anaphylaxis     Naproxen Anaphylaxis     Penicillins      Augmentin Rash     Benadryl [Anti-Itch] Rash     Morphine Hcl Itching and Rash       Social History  Social History     Social History     Marital status:      Spouse name: N/A     Number of children: N/A     Years of education: N/A     Occupational History     Not on file.     Social History Main Topics     Smoking status: Current Some Day Smoker "     Packs/day: 1.00     Years: 45.00     Smokeless tobacco: Never Used     Alcohol use Yes      Comment: occ     Drug use: No     Sexual activity: Not on file     Other Topics Concern     Not on file     Social History Narrative       Family History  No family history on file.    Review of Systems  Preprocedure Note     Last edited 07/20/18 0712 by Fredo Russell APRN CNS               Anesthesia Evaluation     . Pt has had prior anesthetic. Type: General and MAC           ROS/MED HX    ENT/Pulmonary:     (+)sleep apnea, tobacco use, Current use 1 PPD for 45 years packs/day  mild COPD, doesn't use CPAP , . .    Neurologic:     (+)neuropathy - Diabetic peripheral neuropathy,     Cardiovascular:  - neg cardiovascular ROS   (+) ----. : . . . :. . Previous cardiac testing date:results:date: results:ECG reviewed date:6/2018 results:NSR date: results:          METS/Exercise Tolerance:  1 - Eating, dressing   Hematologic: Comments: Pancytopenia requiring bone marrow biopsy    (+) Other Hematologic Disorder-Pancytopenia      Musculoskeletal:   (+) , , other musculoskeletal- right BKA      GI/Hepatic:        (-) GERD   Renal/Genitourinary:  - ROS Renal section negative       Endo: Comment: BMI 38    (+) type II DM Using insulin Diabetic complications: neuropathy, thyroid problem hypothyroidism, Obesity, .      Psychiatric:  - neg psychiatric ROS       Infectious Disease:   (+) MRSA,       Malignancy:         Other:    (+) No chance of pregnancy C-spine cleared: N/A, no H/O Chronic Pain,other significant disability Wheelchair bound                   Physical Exam  Normal systems: cardiovascular, pulmonary and dental    Airway   Mallampati: II  TM distance: >3 FB  Neck ROM: full    Dental     Cardiovascular   Rhythm and rate: regular and normal      Pulmonary (+) decreased breath sounds       Other findings:   Results for BELKYS SHELL (MRN [id number]) as of 7/20/2018 07:04    7/19/2018 13:54  Sodium: 139  Potassium:  3.9  Chloride: 108  Carbon Dioxide: 25  Urea Nitrogen: 8  Creatinine: 0.53  GFR Estimate: >90  GFR Estimate If Black: >90  Calcium: 9.0  Anion Gap: 7  Albumin: 3.3 (L)  Protein Total: 7.2  Bilirubin Total: 0.9  Alkaline Phosphatase: 83  ALT: 31  AST: 44  N-Terminal Pro Bnp: 32  Glucose: 139 (H)  WBC: 2.9 (L)  Hemoglobin: 11.9  Hematocrit: 35.8  Platelet Count: 40 (LL)  RBC Count: 3.58 (L)  MCV: 100  MCH: 33.2 (H)  MCHC: 33.2  RDW: 14.3  INR: 1.14  ABO: O  RH(D): Pos  Antibody Screen: Neg  Test Valid Only At: [facility].  Specimen Expires: 07/22/2018  Blood Bank Comment: Preadmit form rec...    7/19/2018 13:55  TEG WITH HEPARINASE FOR PLT INHIBITION: Rpt (A)  TEG WITH PLATELET INHIBITION: Rpt  Platelet Inhibition with ADP: 20  Platelet Inhibition with AA: 26           PAC Discussion and Assessment    ASA Classification: 3  Case is suitable for: [place]  Anesthetic techniques and relevant risks discussed: GA  Invasive monitoring and risk discussed: Yes  Types:   Possibility and Risk of blood transfusion discussed: Yes  NPO instructions given:   Additional anesthetic preparation and risks discussed:   Needs early admission to pre-op area:   Other:     PAC Resident/NP Anesthesia Assessment:  Fani Escobedo is a 64 yo male scheduled for DaVinci Assisted Total Laparoscopic Hysterectomy, Bilateral Salpingo-Oophorectomy, Cystoscopy, Possible Open, Possible Lymph Node Dissection on 8/8/2018 by Dr. Chavez in treatment of endometrial cancer      Previous anesthesia, last 6/5/2018 06/06/18; 0945; Mask Ventilation: Easy with oral airway; Ease of Intubation: Easy; Airway Size: 7;  Cuffed;  Oral;  Blade Type: Glidescope;  Blade Size: 3;  Place by: FNB CRNA;  Insertion Attempts: 1;  Secured at (cm)to lip: 22 cm;  Breath Sounds: Equal, clear and bilateral;  End Tidal CO2: Present;  Dentition: Intact, Unchanged;  Grade View of Cords: 1 (Right vocal cord noted to have red discoloration prior to intubation seen on  videolaryngoscope.);  Airway Adjuncts:  [place] scope      1) Cardiac: No known cardiac diagnosis or symptoms. EKG 6/5 2018 SR, left axis deviation  2) Pulmonary: Current smoker and has smoked 1 PPD for 45 years.  COPD, not currently on inhalers. USHA, does not use CPAP  3) Heme: thrombocytopenia, Bone marrow biopsy was done on 06/07/2018.  It reveals mildly hypercellular bone marrow with erythroid hyperplasia.  50 to 60% cellularity.  No evidence of myelodysplasia or malignancy.  Megakaryocytes are adequate in number and they are normal morphologically.  Flow cytometry is negative. Will order 4 units platelets for AM of surgery and TEG studies.  4) MSK: Right leg amputee, left toes amputated. W/C bound. Blister on left heel. Scabbed area on left thigh and coccyx.  5) Endo: DM II with last A1C 6/19/2018 at 6.3  6) Onc: new diagnosis of FIGO grade 1 endometrioid adenocarcinoma. D&C done 6/15/2018       Wheelchair bound  Feasible airway                 Reviewed and Signed by PAC Mid-Level Provider/Resident  Mid-Level Provider/Resident: Goldie Russell, APRN  Date: 7/9/2018  Time: 1100    Attending Anesthesiologist Anesthesia Assessment:  I have examined the patient and reviewed the medical record.  I have discussed the patient with the THOMAS and concur with her assessment  The patient is scheduled for robotic assisted VIC/BSO for endometrial CA    The patient has IDDM, improving control - Hgb A1c 6.3 6/2018 after 9.5 on 12/2017  The patient has diabetic peripheral neuropathy and has had right LE amputation and multiple toe amputations in the past  She has no known cardiac disease.  She denies chest pain but has limited activity (uses wheel chair).  She has had echocardiogram in 2012 which she was told was normal. (records unavailable)  She has known COPD for which she uses no medications and feels is stable.  She denies recent symptoms.  She has USHA but does not wear CPAP  She has known thrombocytopenia (Plt 53K) which is  stable.  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  Recommend platelet transfusion as needed.       She has had bleeding with previous surgery but denies bleeding with ADLs    PE:  Obese female in WC.  In NAD.  Left leg amputation.  MPC 2, thick neck , 3 FBTMD,  Lungs clear.  CV  RRR without murmur    Patient has risk factors for CADz but has time sensitive surgery.  Will check BNP and recommend post operative Troponin  Patient has bleeding history and known thrombocytopenia.  Will have 4 units platelets available for day of surgery.  Draw TEG on morning of surgery  Counseled patient to quit smoking  Final plan per attending anesthesiologist the day of surgery.              Reviewed and Signed by PAC Anesthesiologist  Anesthesiologist: Jimmy Landeros MD  Date: 7/19/2018  Time:   Pass/Fail:   Disposition:     PAC Pharmacist Assessment:        Pharmacist:   Date:   Time:                           .     Revision History       Date/Time User Provider Type Action    > 7/20/2018  7:12 AM Fredo Russell APRN CNS Clinical Nurse Specialist Addend     7/19/2018  2:39 PM Fredo Russell APRN CNS Clinical Nurse Specialist Addend     7/19/2018  2:37 PM Fredo Russell APRN CNS Clinical Nurse Specialist Addend     7/19/2018  2:31 PM Fredo Russell APRN CNS Clinical Nurse Specialist Addend     7/19/2018 12:49 PM Jimmy Landeros MD Physician Addend     7/19/2018 11:09 AM Fredo Russell APRN CNS Clinical Nurse Specialist Sign                The complete review of systems is negative other than noted in the HPI or here.                         0 lbs 0 oz  Data Unavailable   There is no height or weight on file to calculate BMI.       Physical Exam  Constitutional: Awake, alert, cooperative, no apparent distress, and appears stated age.  Eyes: Pupils equal, round and reactive to light,  "extra ocular muscles intact, sclera clear, conjunctiva normal.  HENT: Normocephalic, oral pharynx with moist mucus membranes, good dentition. No goiter appreciated.   Respiratory: Clear to auscultation bilaterally, no crackles or wheezing.  Cardiovascular: Regular rate and rhythm, normal S1 and S2, and no murmur noted.  Carotids +2, no bruits. No edema. Palpable pulses to radial  DP and PT arteries.   GI: Normal bowel sounds, soft, non-distended, non-tender, no masses palpated, no hepatosplenomegaly.  Surgical scars: ***  Lymph/Hematologic: No cervical lymphadenopathy and no supraclavicular lymphadenopathy.  Genitourinary:  ***  Skin: Warm and dry.  No rashes at anticipated surgical site.   Musculoskeletal: Full ROM of neck. There is no redness, warmth, or swelling of the joints. Gross motor strength is normal.    Neurologic: Awake, alert, oriented to name, place and time. Cranial nerves II-XII are grossly intact. Gait is normal.   Neuropsychiatric: Calm, cooperative. Normal affect.     Labs: (personally reviewed)  EKG: Personally reviewed but formal cardiology read pending: ***  Cardiac echo: ***  Stress test: ***  PFT's: ***    Outside records reviewed from: ***    ASSESSMENT and PLAN  Fani Escobedo is a 63 year old female scheduled to undergo ***. {He/She:[id number]} has the following specific operative considerations:   - RCRI : {PREOP REVISED CARDIAC INDEX (RCI):415175::\"No serious cardiac risks\"}.  *** risk of major adverse cardiac event.   - Anesthesia considerations:  Refer to PAC assessment in anesthesia records  - VTE risk:  - USHA # of risks ***/8 = ***  - Post-op delirium risk: ***  - If afib, [id number] score ***.  Risk category ***.    - Risk of PONV score = ***.  If > 2, anti-emetic intervention recommended.  - If on chronic opiates, morphine equivalent = *** (if > 60mg/24 hr--> needs pain team consult)    Patient was discussed with { PAC Providers:[id number]}.    Jimmy Landeros MD  Preoperative " Assessment Center  [facility]  [facility] and [facility]  Phone: [phone]  Fax: [phone]

## 2018-08-07 ENCOUNTER — ANESTHESIA EVENT (OUTPATIENT)
Dept: SURGERY | Facility: CLINIC | Age: 63
DRG: 740 | End: 2018-08-07
Payer: COMMERCIAL

## 2018-08-08 ENCOUNTER — APPOINTMENT (OUTPATIENT)
Dept: GENERAL RADIOLOGY | Facility: CLINIC | Age: 63
DRG: 740 | End: 2018-08-08
Attending: STUDENT IN AN ORGANIZED HEALTH CARE EDUCATION/TRAINING PROGRAM
Payer: COMMERCIAL

## 2018-08-08 ENCOUNTER — HOSPITAL ENCOUNTER (INPATIENT)
Facility: CLINIC | Age: 63
LOS: 2 days | Discharge: HOME | DRG: 740 | End: 2018-08-10
Attending: OBSTETRICS & GYNECOLOGY | Admitting: OBSTETRICS & GYNECOLOGY
Payer: COMMERCIAL

## 2018-08-08 ENCOUNTER — ANESTHESIA (OUTPATIENT)
Dept: SURGERY | Facility: CLINIC | Age: 63
DRG: 740 | End: 2018-08-08
Payer: COMMERCIAL

## 2018-08-08 DIAGNOSIS — N92.0 EXCESSIVE OR FREQUENT MENSTRUATION: ICD-10-CM

## 2018-08-08 DIAGNOSIS — D61.818 PANCYTOPENIA (H): ICD-10-CM

## 2018-08-08 DIAGNOSIS — C54.1 ENDOMETRIAL CANCER (H): ICD-10-CM

## 2018-08-08 DIAGNOSIS — D69.6 THROMBOCYTOPENIA (H): Primary | ICD-10-CM

## 2018-08-08 PROBLEM — Z90.710 S/P HYSTERECTOMY: Status: ACTIVE | Noted: 2018-08-08

## 2018-08-08 LAB
ABO + RH BLD: NORMAL
ABO + RH BLD: NORMAL
APTT PPP: 30 SEC (ref 22–37)
APTT PPP: 30 SEC (ref 22–37)
APTT PPP: 31 SEC (ref 22–37)
BLD GP AB SCN SERPL QL: NORMAL
BLD PROD TYP BPU: NORMAL
BLD UNIT ID BPU: 0
BLOOD BANK CMNT PATIENT-IMP: NORMAL
BLOOD BANK CMNT PATIENT-IMP: NORMAL
BLOOD PRODUCT CODE: NORMAL
BPU ID: NORMAL
CREAT SERPL-MCNC: 0.49 MG/DL (ref 0.52–1.04)
ERYTHROCYTE [DISTWIDTH] IN BLOOD BY AUTOMATED COUNT: 14.5 % (ref 10–15)
ERYTHROCYTE [DISTWIDTH] IN BLOOD BY AUTOMATED COUNT: 14.6 % (ref 10–15)
ERYTHROCYTE [DISTWIDTH] IN BLOOD BY AUTOMATED COUNT: 14.6 % (ref 10–15)
ERYTHROCYTE [DISTWIDTH] IN BLOOD BY AUTOMATED COUNT: 14.7 % (ref 10–15)
FIBRINOGEN PPP-MCNC: 321 MG/DL (ref 200–420)
GFR SERPL CREATININE-BSD FRML MDRD: >90 ML/MIN/1.7M2
GLUCOSE BLDC GLUCOMTR-MCNC: 200 MG/DL (ref 70–99)
GLUCOSE BLDC GLUCOMTR-MCNC: 239 MG/DL (ref 70–99)
GLUCOSE BLDC GLUCOMTR-MCNC: 241 MG/DL (ref 70–99)
GLUCOSE BLDC GLUCOMTR-MCNC: 270 MG/DL (ref 70–99)
GLUCOSE BLDC GLUCOMTR-MCNC: 282 MG/DL (ref 70–99)
GLUCOSE BLDC GLUCOMTR-MCNC: 283 MG/DL (ref 70–99)
GLUCOSE BLDC GLUCOMTR-MCNC: 304 MG/DL (ref 70–99)
GLUCOSE BLDC GLUCOMTR-MCNC: 325 MG/DL (ref 70–99)
HBA1C MFR BLD: 6.8 % (ref 0–5.6)
HCT VFR BLD AUTO: 29.6 % (ref 35–47)
HCT VFR BLD AUTO: 30.1 % (ref 35–47)
HCT VFR BLD AUTO: 31.2 % (ref 35–47)
HCT VFR BLD AUTO: 33.7 % (ref 35–47)
HCT VFR BLD AUTO: 35.1 % (ref 35–47)
HCT VFR BLD AUTO: 35.1 % (ref 35–47)
HGB BLD-MCNC: 10.6 G/DL (ref 11.7–15.7)
HGB BLD-MCNC: 11.3 G/DL (ref 11.7–15.7)
HGB BLD-MCNC: 11.5 G/DL (ref 11.7–15.7)
HGB BLD-MCNC: 9.3 G/DL (ref 11.7–15.7)
HGB BLD-MCNC: 9.5 G/DL (ref 11.7–15.7)
HGB BLD-MCNC: 9.8 G/DL (ref 11.7–15.7)
INR PPP: 1.21 (ref 0.86–1.14)
INR PPP: 1.27 (ref 0.86–1.14)
INR PPP: 1.29 (ref 0.86–1.14)
MCH RBC QN AUTO: 31.5 PG (ref 26.5–33)
MCH RBC QN AUTO: 31.6 PG (ref 26.5–33)
MCH RBC QN AUTO: 31.8 PG (ref 26.5–33)
MCH RBC QN AUTO: 32 PG (ref 26.5–33)
MCH RBC QN AUTO: 32.4 PG (ref 26.5–33)
MCH RBC QN AUTO: 32.7 PG (ref 26.5–33)
MCHC RBC AUTO-ENTMCNC: 31.4 G/DL (ref 31.5–36.5)
MCHC RBC AUTO-ENTMCNC: 31.4 G/DL (ref 31.5–36.5)
MCHC RBC AUTO-ENTMCNC: 31.5 G/DL (ref 31.5–36.5)
MCHC RBC AUTO-ENTMCNC: 31.6 G/DL (ref 31.5–36.5)
MCHC RBC AUTO-ENTMCNC: 32.2 G/DL (ref 31.5–36.5)
MCHC RBC AUTO-ENTMCNC: 32.8 G/DL (ref 31.5–36.5)
MCV RBC AUTO: 100 FL (ref 78–100)
MCV RBC AUTO: 100 FL (ref 78–100)
MCV RBC AUTO: 101 FL (ref 78–100)
MCV RBC AUTO: 102 FL (ref 78–100)
NUM BPU REQUESTED: 1
NUM BPU REQUESTED: 1
NUM BPU REQUESTED: 4
PLATELET # BLD AUTO: 29 10E9/L (ref 150–450)
PLATELET # BLD AUTO: 31 10E9/L (ref 150–450)
PLATELET # BLD AUTO: 34 10E9/L (ref 150–450)
PLATELET # BLD AUTO: 35 10E9/L (ref 150–450)
PLATELET # BLD AUTO: 39 10E9/L (ref 150–450)
PLATELET # BLD AUTO: 44 10E9/L (ref 150–450)
POTASSIUM SERPL-SCNC: 3.9 MMOL/L (ref 3.4–5.3)
RBC # BLD AUTO: 2.94 10E12/L (ref 3.8–5.2)
RBC # BLD AUTO: 3.02 10E12/L (ref 3.8–5.2)
RBC # BLD AUTO: 3.08 10E12/L (ref 3.8–5.2)
RBC # BLD AUTO: 3.31 10E12/L (ref 3.8–5.2)
RBC # BLD AUTO: 3.49 10E12/L (ref 3.8–5.2)
RBC # BLD AUTO: 3.52 10E12/L (ref 3.8–5.2)
SPECIMEN EXP DATE BLD: NORMAL
TRANSFUSION STATUS PATIENT QL: NORMAL
WBC # BLD AUTO: 2.6 10E9/L (ref 4–11)
WBC # BLD AUTO: 2.9 10E9/L (ref 4–11)
WBC # BLD AUTO: 3.2 10E9/L (ref 4–11)
WBC # BLD AUTO: 3.6 10E9/L (ref 4–11)
WBC # BLD AUTO: 3.9 10E9/L (ref 4–11)
WBC # BLD AUTO: 5.3 10E9/L (ref 4–11)

## 2018-08-08 PROCEDURE — 71000017 ZZH RECOVERY PHASE 1 LEVEL 3 EA ADDTL HR: Performed by: OBSTETRICS & GYNECOLOGY

## 2018-08-08 PROCEDURE — 85610 PROTHROMBIN TIME: CPT | Performed by: OBSTETRICS & GYNECOLOGY

## 2018-08-08 PROCEDURE — P9073 PLATELETS PHERESIS PATH REDU: HCPCS | Performed by: OBSTETRICS & GYNECOLOGY

## 2018-08-08 PROCEDURE — C9399 UNCLASSIFIED DRUGS OR BIOLOG: HCPCS | Performed by: NURSE ANESTHETIST, CERTIFIED REGISTERED

## 2018-08-08 PROCEDURE — 40000171 ZZH STATISTIC PRE-PROCEDURE ASSESSMENT III: Performed by: OBSTETRICS & GYNECOLOGY

## 2018-08-08 PROCEDURE — 25000125 ZZHC RX 250: Performed by: OBSTETRICS & GYNECOLOGY

## 2018-08-08 PROCEDURE — 85610 PROTHROMBIN TIME: CPT | Performed by: STUDENT IN AN ORGANIZED HEALTH CARE EDUCATION/TRAINING PROGRAM

## 2018-08-08 PROCEDURE — 25000128 H RX IP 250 OP 636: Performed by: OBSTETRICS & GYNECOLOGY

## 2018-08-08 PROCEDURE — P9041 ALBUMIN (HUMAN),5%, 50ML: HCPCS | Performed by: NURSE ANESTHETIST, CERTIFIED REGISTERED

## 2018-08-08 PROCEDURE — 27210995 ZZH RX 272: Performed by: OBSTETRICS & GYNECOLOGY

## 2018-08-08 PROCEDURE — 71000016 ZZH RECOVERY PHASE 1 LEVEL 3 FIRST HR: Performed by: OBSTETRICS & GYNECOLOGY

## 2018-08-08 PROCEDURE — 25000566 ZZH SEVOFLURANE, EA 15 MIN: Performed by: OBSTETRICS & GYNECOLOGY

## 2018-08-08 PROCEDURE — 85730 THROMBOPLASTIN TIME PARTIAL: CPT | Performed by: STUDENT IN AN ORGANIZED HEALTH CARE EDUCATION/TRAINING PROGRAM

## 2018-08-08 PROCEDURE — 36000086 ZZH SURGERY LEVEL 8 1ST 30 MIN UMMC: Performed by: OBSTETRICS & GYNECOLOGY

## 2018-08-08 PROCEDURE — 27210794 ZZH OR GENERAL SUPPLY STERILE: Performed by: OBSTETRICS & GYNECOLOGY

## 2018-08-08 PROCEDURE — 36000088 ZZH SURGERY LEVEL 8 EA 15 ADDTL MIN - UMMC: Performed by: OBSTETRICS & GYNECOLOGY

## 2018-08-08 PROCEDURE — 36415 COLL VENOUS BLD VENIPUNCTURE: CPT | Performed by: STUDENT IN AN ORGANIZED HEALTH CARE EDUCATION/TRAINING PROGRAM

## 2018-08-08 PROCEDURE — 84132 ASSAY OF SERUM POTASSIUM: CPT | Performed by: ANESTHESIOLOGY

## 2018-08-08 PROCEDURE — 88305 TISSUE EXAM BY PATHOLOGIST: CPT | Performed by: OBSTETRICS & GYNECOLOGY

## 2018-08-08 PROCEDURE — 88341 IMHCHEM/IMCYTCHM EA ADD ANTB: CPT | Performed by: OBSTETRICS & GYNECOLOGY

## 2018-08-08 PROCEDURE — 40000556 ZZH STATISTIC PERIPHERAL IV START W US GUIDANCE

## 2018-08-08 PROCEDURE — 25000132 ZZH RX MED GY IP 250 OP 250 PS 637: Performed by: ANESTHESIOLOGY

## 2018-08-08 PROCEDURE — 85027 COMPLETE CBC AUTOMATED: CPT | Performed by: STUDENT IN AN ORGANIZED HEALTH CARE EDUCATION/TRAINING PROGRAM

## 2018-08-08 PROCEDURE — 0TJB8ZZ INSPECTION OF BLADDER, VIA NATURAL OR ARTIFICIAL OPENING ENDOSCOPIC: ICD-10-PCS | Performed by: OBSTETRICS & GYNECOLOGY

## 2018-08-08 PROCEDURE — 88331 PATH CONSLTJ SURG 1 BLK 1SPC: CPT | Performed by: OBSTETRICS & GYNECOLOGY

## 2018-08-08 PROCEDURE — 00000146 ZZHCL STATISTIC GLUCOSE BY METER IP

## 2018-08-08 PROCEDURE — 85027 COMPLETE CBC AUTOMATED: CPT | Performed by: ANESTHESIOLOGY

## 2018-08-08 PROCEDURE — 25000128 H RX IP 250 OP 636: Performed by: STUDENT IN AN ORGANIZED HEALTH CARE EDUCATION/TRAINING PROGRAM

## 2018-08-08 PROCEDURE — 85730 THROMBOPLASTIN TIME PARTIAL: CPT | Performed by: OBSTETRICS & GYNECOLOGY

## 2018-08-08 PROCEDURE — 07BC0ZX EXCISION OF PELVIS LYMPHATIC, OPEN APPROACH, DIAGNOSTIC: ICD-10-PCS | Performed by: OBSTETRICS & GYNECOLOGY

## 2018-08-08 PROCEDURE — 40000014 ZZH STATISTIC ARTERIAL MONITORING DAILY

## 2018-08-08 PROCEDURE — 8E0W4CZ ROBOTIC ASSISTED PROCEDURE OF TRUNK REGION, PERCUTANEOUS ENDOSCOPIC APPROACH: ICD-10-PCS | Performed by: OBSTETRICS & GYNECOLOGY

## 2018-08-08 PROCEDURE — 25000128 H RX IP 250 OP 636: Performed by: NURSE ANESTHETIST, CERTIFIED REGISTERED

## 2018-08-08 PROCEDURE — 36415 COLL VENOUS BLD VENIPUNCTURE: CPT | Performed by: ANESTHESIOLOGY

## 2018-08-08 PROCEDURE — 25000125 ZZHC RX 250: Performed by: STUDENT IN AN ORGANIZED HEALTH CARE EDUCATION/TRAINING PROGRAM

## 2018-08-08 PROCEDURE — 0UT24ZZ RESECTION OF BILATERAL OVARIES, PERCUTANEOUS ENDOSCOPIC APPROACH: ICD-10-PCS | Performed by: OBSTETRICS & GYNECOLOGY

## 2018-08-08 PROCEDURE — 87040 BLOOD CULTURE FOR BACTERIA: CPT | Performed by: STUDENT IN AN ORGANIZED HEALTH CARE EDUCATION/TRAINING PROGRAM

## 2018-08-08 PROCEDURE — 85384 FIBRINOGEN ACTIVITY: CPT | Performed by: OBSTETRICS & GYNECOLOGY

## 2018-08-08 PROCEDURE — 25000125 ZZHC RX 250: Performed by: NURSE PRACTITIONER

## 2018-08-08 PROCEDURE — 37000009 ZZH ANESTHESIA TECHNICAL FEE, EACH ADDTL 15 MIN: Performed by: OBSTETRICS & GYNECOLOGY

## 2018-08-08 PROCEDURE — 00000155 ZZHCL STATISTIC H-CELL BLOCK W/STAIN: Performed by: OBSTETRICS & GYNECOLOGY

## 2018-08-08 PROCEDURE — 88342 IMHCHEM/IMCYTCHM 1ST ANTB: CPT | Performed by: OBSTETRICS & GYNECOLOGY

## 2018-08-08 PROCEDURE — 71045 X-RAY EXAM CHEST 1 VIEW: CPT

## 2018-08-08 PROCEDURE — 12000006 ZZH R&B IMCU INTERMEDIATE UMMC

## 2018-08-08 PROCEDURE — 88307 TISSUE EXAM BY PATHOLOGIST: CPT | Performed by: OBSTETRICS & GYNECOLOGY

## 2018-08-08 PROCEDURE — 83036 HEMOGLOBIN GLYCOSYLATED A1C: CPT | Performed by: OBSTETRICS & GYNECOLOGY

## 2018-08-08 PROCEDURE — 40000344 ZZHCL STATISTIC THAWING COMPONENT: Performed by: OBSTETRICS & GYNECOLOGY

## 2018-08-08 PROCEDURE — 0UTC4ZZ RESECTION OF CERVIX, PERCUTANEOUS ENDOSCOPIC APPROACH: ICD-10-PCS | Performed by: OBSTETRICS & GYNECOLOGY

## 2018-08-08 PROCEDURE — 88112 CYTOPATH CELL ENHANCE TECH: CPT | Performed by: OBSTETRICS & GYNECOLOGY

## 2018-08-08 PROCEDURE — 25000132 ZZH RX MED GY IP 250 OP 250 PS 637: Performed by: NURSE ANESTHETIST, CERTIFIED REGISTERED

## 2018-08-08 PROCEDURE — 25000128 H RX IP 250 OP 636: Performed by: ANESTHESIOLOGY

## 2018-08-08 PROCEDURE — C9290 INJ, BUPIVACAINE LIPOSOME: HCPCS | Performed by: STUDENT IN AN ORGANIZED HEALTH CARE EDUCATION/TRAINING PROGRAM

## 2018-08-08 PROCEDURE — 0UT74ZZ RESECTION OF BILATERAL FALLOPIAN TUBES, PERCUTANEOUS ENDOSCOPIC APPROACH: ICD-10-PCS | Performed by: OBSTETRICS & GYNECOLOGY

## 2018-08-08 PROCEDURE — 85027 COMPLETE CBC AUTOMATED: CPT | Performed by: OBSTETRICS & GYNECOLOGY

## 2018-08-08 PROCEDURE — 25000132 ZZH RX MED GY IP 250 OP 250 PS 637: Performed by: OBSTETRICS & GYNECOLOGY

## 2018-08-08 PROCEDURE — 0UT94ZZ RESECTION OF UTERUS, PERCUTANEOUS ENDOSCOPIC APPROACH: ICD-10-PCS | Performed by: OBSTETRICS & GYNECOLOGY

## 2018-08-08 PROCEDURE — 40000275 ZZH STATISTIC RCP TIME EA 10 MIN

## 2018-08-08 PROCEDURE — 25000125 ZZHC RX 250: Performed by: NURSE ANESTHETIST, CERTIFIED REGISTERED

## 2018-08-08 PROCEDURE — 82565 ASSAY OF CREATININE: CPT | Performed by: ANESTHESIOLOGY

## 2018-08-08 PROCEDURE — 25000564 ZZH DESFLURANE, EA 15 MIN: Performed by: OBSTETRICS & GYNECOLOGY

## 2018-08-08 PROCEDURE — P9037 PLATE PHERES LEUKOREDU IRRAD: HCPCS | Performed by: STUDENT IN AN ORGANIZED HEALTH CARE EDUCATION/TRAINING PROGRAM

## 2018-08-08 PROCEDURE — 25000125 ZZHC RX 250: Performed by: ANESTHESIOLOGY

## 2018-08-08 PROCEDURE — 88309 TISSUE EXAM BY PATHOLOGIST: CPT | Performed by: OBSTETRICS & GYNECOLOGY

## 2018-08-08 PROCEDURE — 37000008 ZZH ANESTHESIA TECHNICAL FEE, 1ST 30 MIN: Performed by: OBSTETRICS & GYNECOLOGY

## 2018-08-08 PROCEDURE — P9037 PLATE PHERES LEUKOREDU IRRAD: HCPCS | Performed by: OBSTETRICS & GYNECOLOGY

## 2018-08-08 RX ORDER — ONDANSETRON 4 MG/1
4 TABLET, ORALLY DISINTEGRATING ORAL EVERY 8 HOURS PRN
Status: DISCONTINUED | OUTPATIENT
Start: 2018-08-09 | End: 2018-08-10 | Stop reason: HOSPADM

## 2018-08-08 RX ORDER — EPHEDRINE SULFATE 50 MG/ML
INJECTION, SOLUTION INTRAMUSCULAR; INTRAVENOUS; SUBCUTANEOUS PRN
Status: DISCONTINUED | OUTPATIENT
Start: 2018-08-08 | End: 2018-08-08

## 2018-08-08 RX ORDER — SIMETHICONE 80 MG
80 TABLET,CHEWABLE ORAL 4 TIMES DAILY PRN
Status: DISCONTINUED | OUTPATIENT
Start: 2018-08-08 | End: 2018-08-10 | Stop reason: HOSPADM

## 2018-08-08 RX ORDER — DEXAMETHASONE SODIUM PHOSPHATE 4 MG/ML
INJECTION, SOLUTION INTRA-ARTICULAR; INTRALESIONAL; INTRAMUSCULAR; INTRAVENOUS; SOFT TISSUE PRN
Status: DISCONTINUED | OUTPATIENT
Start: 2018-08-08 | End: 2018-08-08

## 2018-08-08 RX ORDER — LIDOCAINE 40 MG/G
CREAM TOPICAL
Status: DISCONTINUED | OUTPATIENT
Start: 2018-08-08 | End: 2018-08-08 | Stop reason: HOSPADM

## 2018-08-08 RX ORDER — ALBUMIN, HUMAN INJ 5% 5 %
SOLUTION INTRAVENOUS CONTINUOUS PRN
Status: DISCONTINUED | OUTPATIENT
Start: 2018-08-08 | End: 2018-08-08

## 2018-08-08 RX ORDER — AMOXICILLIN 250 MG
1 CAPSULE ORAL 2 TIMES DAILY PRN
Status: DISCONTINUED | OUTPATIENT
Start: 2018-08-08 | End: 2018-08-10 | Stop reason: HOSPADM

## 2018-08-08 RX ORDER — IPRATROPIUM BROMIDE AND ALBUTEROL SULFATE 2.5; .5 MG/3ML; MG/3ML
3 SOLUTION RESPIRATORY (INHALATION)
Status: DISCONTINUED | OUTPATIENT
Start: 2018-08-08 | End: 2018-08-09

## 2018-08-08 RX ORDER — SODIUM CHLORIDE 9 MG/ML
INJECTION, SOLUTION INTRAVENOUS CONTINUOUS
Status: DISCONTINUED | OUTPATIENT
Start: 2018-08-08 | End: 2018-08-08

## 2018-08-08 RX ORDER — DEXTROSE MONOHYDRATE 25 G/50ML
25-50 INJECTION, SOLUTION INTRAVENOUS
Status: DISCONTINUED | OUTPATIENT
Start: 2018-08-08 | End: 2018-08-09

## 2018-08-08 RX ORDER — SULFAMETHOXAZOLE/TRIMETHOPRIM 800-160 MG
1 TABLET ORAL 2 TIMES DAILY
Status: DISCONTINUED | OUTPATIENT
Start: 2018-08-09 | End: 2018-08-10 | Stop reason: HOSPADM

## 2018-08-08 RX ORDER — ONDANSETRON 4 MG/1
4 TABLET, ORALLY DISINTEGRATING ORAL EVERY 30 MIN PRN
Status: DISCONTINUED | OUTPATIENT
Start: 2018-08-08 | End: 2018-08-08 | Stop reason: HOSPADM

## 2018-08-08 RX ORDER — LIDOCAINE 40 MG/G
CREAM TOPICAL
Status: DISCONTINUED | OUTPATIENT
Start: 2018-08-08 | End: 2018-08-10 | Stop reason: HOSPADM

## 2018-08-08 RX ORDER — FENTANYL CITRATE 50 UG/ML
INJECTION, SOLUTION INTRAMUSCULAR; INTRAVENOUS PRN
Status: DISCONTINUED | OUTPATIENT
Start: 2018-08-08 | End: 2018-08-08

## 2018-08-08 RX ORDER — CLINDAMYCIN PHOSPHATE 900 MG/50ML
900 INJECTION, SOLUTION INTRAVENOUS SEE ADMIN INSTRUCTIONS
Status: DISCONTINUED | OUTPATIENT
Start: 2018-08-08 | End: 2018-08-08 | Stop reason: HOSPADM

## 2018-08-08 RX ORDER — FUROSEMIDE 10 MG/ML
20 INJECTION INTRAMUSCULAR; INTRAVENOUS ONCE
Status: COMPLETED | OUTPATIENT
Start: 2018-08-08 | End: 2018-08-08

## 2018-08-08 RX ORDER — FENTANYL CITRATE 50 UG/ML
25-50 INJECTION, SOLUTION INTRAMUSCULAR; INTRAVENOUS
Status: DISCONTINUED | OUTPATIENT
Start: 2018-08-08 | End: 2018-08-08 | Stop reason: HOSPADM

## 2018-08-08 RX ORDER — BUPIVACAINE HYDROCHLORIDE 2.5 MG/ML
INJECTION, SOLUTION EPIDURAL; INFILTRATION; INTRACAUDAL PRN
Status: DISCONTINUED | OUTPATIENT
Start: 2018-08-08 | End: 2018-08-08

## 2018-08-08 RX ORDER — FLUMAZENIL 0.1 MG/ML
0.2 INJECTION, SOLUTION INTRAVENOUS
Status: DISCONTINUED | OUTPATIENT
Start: 2018-08-08 | End: 2018-08-08 | Stop reason: HOSPADM

## 2018-08-08 RX ORDER — HYDROXYZINE HYDROCHLORIDE 25 MG/1
25 TABLET, FILM COATED ORAL EVERY 6 HOURS PRN
Status: DISCONTINUED | OUTPATIENT
Start: 2018-08-08 | End: 2018-08-10 | Stop reason: HOSPADM

## 2018-08-08 RX ORDER — ONDANSETRON 2 MG/ML
INJECTION INTRAMUSCULAR; INTRAVENOUS PRN
Status: DISCONTINUED | OUTPATIENT
Start: 2018-08-08 | End: 2018-08-08

## 2018-08-08 RX ORDER — NICOTINE POLACRILEX 4 MG
15-30 LOZENGE BUCCAL
Status: DISCONTINUED | OUTPATIENT
Start: 2018-08-08 | End: 2018-08-09

## 2018-08-08 RX ORDER — ONDANSETRON 4 MG/1
4 TABLET, ORALLY DISINTEGRATING ORAL EVERY 8 HOURS
Status: DISPENSED | OUTPATIENT
Start: 2018-08-08 | End: 2018-08-09

## 2018-08-08 RX ORDER — AMOXICILLIN 250 MG
2 CAPSULE ORAL 2 TIMES DAILY PRN
Status: DISCONTINUED | OUTPATIENT
Start: 2018-08-08 | End: 2018-08-10 | Stop reason: HOSPADM

## 2018-08-08 RX ORDER — HYDROMORPHONE HYDROCHLORIDE 1 MG/ML
.3-.5 INJECTION, SOLUTION INTRAMUSCULAR; INTRAVENOUS; SUBCUTANEOUS EVERY 5 MIN PRN
Status: DISCONTINUED | OUTPATIENT
Start: 2018-08-08 | End: 2018-08-08 | Stop reason: HOSPADM

## 2018-08-08 RX ORDER — LEVOTHYROXINE SODIUM 50 UG/1
50 TABLET ORAL
Status: DISCONTINUED | OUTPATIENT
Start: 2018-08-09 | End: 2018-08-10 | Stop reason: HOSPADM

## 2018-08-08 RX ORDER — ALBUTEROL SULFATE 90 UG/1
AEROSOL, METERED RESPIRATORY (INHALATION) PRN
Status: DISCONTINUED | OUTPATIENT
Start: 2018-08-08 | End: 2018-08-08

## 2018-08-08 RX ORDER — HYDROMORPHONE HCL/0.9% NACL/PF 0.2MG/0.2
0.2 SYRINGE (ML) INTRAVENOUS
Status: DISCONTINUED | OUTPATIENT
Start: 2018-08-08 | End: 2018-08-10 | Stop reason: HOSPADM

## 2018-08-08 RX ORDER — DEXMEDETOMIDINE HYDROCHLORIDE 4 UG/ML
0.2-1.2 INJECTION, SOLUTION INTRAVENOUS CONTINUOUS
Status: DISCONTINUED | OUTPATIENT
Start: 2018-08-08 | End: 2018-08-08

## 2018-08-08 RX ORDER — SODIUM CHLORIDE, SODIUM LACTATE, POTASSIUM CHLORIDE, CALCIUM CHLORIDE 600; 310; 30; 20 MG/100ML; MG/100ML; MG/100ML; MG/100ML
INJECTION, SOLUTION INTRAVENOUS CONTINUOUS PRN
Status: DISCONTINUED | OUTPATIENT
Start: 2018-08-08 | End: 2018-08-08

## 2018-08-08 RX ORDER — LIDOCAINE HYDROCHLORIDE 20 MG/ML
INJECTION, SOLUTION INFILTRATION; PERINEURAL PRN
Status: DISCONTINUED | OUTPATIENT
Start: 2018-08-08 | End: 2018-08-08

## 2018-08-08 RX ORDER — NALOXONE HYDROCHLORIDE 0.4 MG/ML
.1-.4 INJECTION, SOLUTION INTRAMUSCULAR; INTRAVENOUS; SUBCUTANEOUS
Status: DISCONTINUED | OUTPATIENT
Start: 2018-08-08 | End: 2018-08-08 | Stop reason: HOSPADM

## 2018-08-08 RX ORDER — ACETAMINOPHEN 325 MG/1
650 TABLET ORAL EVERY 6 HOURS
Status: DISCONTINUED | OUTPATIENT
Start: 2018-08-08 | End: 2018-08-10 | Stop reason: HOSPADM

## 2018-08-08 RX ORDER — PHENAZOPYRIDINE HYDROCHLORIDE 200 MG/1
200 TABLET, FILM COATED ORAL ONCE
Status: COMPLETED | OUTPATIENT
Start: 2018-08-08 | End: 2018-08-08

## 2018-08-08 RX ORDER — ONDANSETRON 2 MG/ML
4 INJECTION INTRAMUSCULAR; INTRAVENOUS EVERY 30 MIN PRN
Status: DISCONTINUED | OUTPATIENT
Start: 2018-08-08 | End: 2018-08-08 | Stop reason: HOSPADM

## 2018-08-08 RX ORDER — BISACODYL 10 MG
10 SUPPOSITORY, RECTAL RECTAL DAILY
Status: DISCONTINUED | OUTPATIENT
Start: 2018-08-09 | End: 2018-08-10 | Stop reason: HOSPADM

## 2018-08-08 RX ORDER — PROPOFOL 10 MG/ML
INJECTION, EMULSION INTRAVENOUS PRN
Status: DISCONTINUED | OUTPATIENT
Start: 2018-08-08 | End: 2018-08-08

## 2018-08-08 RX ORDER — NICOTINE POLACRILEX 4 MG
15-30 LOZENGE BUCCAL
Status: DISCONTINUED | OUTPATIENT
Start: 2018-08-08 | End: 2018-08-10 | Stop reason: HOSPADM

## 2018-08-08 RX ORDER — NALOXONE HYDROCHLORIDE 0.4 MG/ML
.1-.4 INJECTION, SOLUTION INTRAMUSCULAR; INTRAVENOUS; SUBCUTANEOUS
Status: ACTIVE | OUTPATIENT
Start: 2018-08-08 | End: 2018-08-09

## 2018-08-08 RX ORDER — SODIUM CHLORIDE, SODIUM LACTATE, POTASSIUM CHLORIDE, CALCIUM CHLORIDE 600; 310; 30; 20 MG/100ML; MG/100ML; MG/100ML; MG/100ML
INJECTION, SOLUTION INTRAVENOUS CONTINUOUS
Status: DISCONTINUED | OUTPATIENT
Start: 2018-08-08 | End: 2018-08-08 | Stop reason: HOSPADM

## 2018-08-08 RX ORDER — HEPARIN SODIUM 5000 [USP'U]/.5ML
5000 INJECTION, SOLUTION INTRAVENOUS; SUBCUTANEOUS
Status: DISCONTINUED | OUTPATIENT
Start: 2018-08-08 | End: 2018-08-08 | Stop reason: HOSPADM

## 2018-08-08 RX ORDER — GABAPENTIN 600 MG/1
1200 TABLET ORAL 3 TIMES DAILY
Status: DISCONTINUED | OUTPATIENT
Start: 2018-08-08 | End: 2018-08-10 | Stop reason: HOSPADM

## 2018-08-08 RX ORDER — IPRATROPIUM BROMIDE AND ALBUTEROL SULFATE 2.5; .5 MG/3ML; MG/3ML
3 SOLUTION RESPIRATORY (INHALATION) ONCE
Status: COMPLETED | OUTPATIENT
Start: 2018-08-08 | End: 2018-08-08

## 2018-08-08 RX ORDER — INDOCYANINE GREEN AND WATER 25 MG
KIT INJECTION PRN
Status: DISCONTINUED | OUTPATIENT
Start: 2018-08-08 | End: 2018-08-08 | Stop reason: HOSPADM

## 2018-08-08 RX ORDER — DEXTROSE MONOHYDRATE 25 G/50ML
25-50 INJECTION, SOLUTION INTRAVENOUS
Status: DISCONTINUED | OUTPATIENT
Start: 2018-08-08 | End: 2018-08-10 | Stop reason: HOSPADM

## 2018-08-08 RX ORDER — OXYCODONE HYDROCHLORIDE 5 MG/1
5-10 TABLET ORAL
Status: DISCONTINUED | OUTPATIENT
Start: 2018-08-08 | End: 2018-08-10

## 2018-08-08 RX ORDER — NALOXONE HYDROCHLORIDE 0.4 MG/ML
.1-.4 INJECTION, SOLUTION INTRAMUSCULAR; INTRAVENOUS; SUBCUTANEOUS
Status: DISCONTINUED | OUTPATIENT
Start: 2018-08-08 | End: 2018-08-10 | Stop reason: HOSPADM

## 2018-08-08 RX ADMIN — LIDOCAINE HYDROCHLORIDE 50 MG: 20 INJECTION, SOLUTION INFILTRATION; PERINEURAL at 08:14

## 2018-08-08 RX ADMIN — ALBUMIN HUMAN: 0.05 INJECTION, SOLUTION INTRAVENOUS at 12:03

## 2018-08-08 RX ADMIN — HUMAN INSULIN 2.5 UNITS/HR: 100 INJECTION, SOLUTION SUBCUTANEOUS at 23:03

## 2018-08-08 RX ADMIN — BUPIVACAINE HYDROCHLORIDE 20 ML: 2.5 INJECTION, SOLUTION EPIDURAL; INFILTRATION; INTRACAUDAL at 13:35

## 2018-08-08 RX ADMIN — MIDAZOLAM 1 MG: 1 INJECTION INTRAMUSCULAR; INTRAVENOUS at 08:01

## 2018-08-08 RX ADMIN — FENTANYL CITRATE 50 MCG: 50 INJECTION, SOLUTION INTRAMUSCULAR; INTRAVENOUS at 09:30

## 2018-08-08 RX ADMIN — SODIUM CHLORIDE: 9 INJECTION, SOLUTION INTRAVENOUS at 16:01

## 2018-08-08 RX ADMIN — PHENYLEPHRINE HYDROCHLORIDE 100 MCG: 10 INJECTION, SOLUTION INTRAMUSCULAR; INTRAVENOUS; SUBCUTANEOUS at 08:38

## 2018-08-08 RX ADMIN — SODIUM CHLORIDE, POTASSIUM CHLORIDE, SODIUM LACTATE AND CALCIUM CHLORIDE: 600; 310; 30; 20 INJECTION, SOLUTION INTRAVENOUS at 08:01

## 2018-08-08 RX ADMIN — HUMAN INSULIN 3 UNITS: 100 INJECTION, SOLUTION SUBCUTANEOUS at 17:56

## 2018-08-08 RX ADMIN — IPRATROPIUM BROMIDE AND ALBUTEROL SULFATE 3 ML: .5; 3 SOLUTION RESPIRATORY (INHALATION) at 07:51

## 2018-08-08 RX ADMIN — SUGAMMADEX 200 MG: 100 INJECTION, SOLUTION INTRAVENOUS at 12:21

## 2018-08-08 RX ADMIN — ROCURONIUM BROMIDE 20 MG: 10 INJECTION INTRAVENOUS at 09:00

## 2018-08-08 RX ADMIN — PHENAZOPYRIDINE HYDROCHLORIDE 200 MG: 200 TABLET, FILM COATED ORAL at 07:50

## 2018-08-08 RX ADMIN — SODIUM CHLORIDE, POTASSIUM CHLORIDE, SODIUM LACTATE AND CALCIUM CHLORIDE 46 ML/HR: 600; 310; 30; 20 INJECTION, SOLUTION INTRAVENOUS at 21:26

## 2018-08-08 RX ADMIN — GENTAMICIN SULFATE 200 MG: 40 INJECTION, SOLUTION INTRAMUSCULAR; INTRAVENOUS at 08:44

## 2018-08-08 RX ADMIN — PHENYLEPHRINE HYDROCHLORIDE 100 MCG: 10 INJECTION, SOLUTION INTRAMUSCULAR; INTRAVENOUS; SUBCUTANEOUS at 08:51

## 2018-08-08 RX ADMIN — Medication 0.3 MG: at 18:08

## 2018-08-08 RX ADMIN — HUMAN INSULIN 2 UNITS: 100 INJECTION, SOLUTION SUBCUTANEOUS at 16:07

## 2018-08-08 RX ADMIN — IPRATROPIUM BROMIDE AND ALBUTEROL SULFATE 3 ML: .5; 3 SOLUTION RESPIRATORY (INHALATION) at 19:02

## 2018-08-08 RX ADMIN — PHENYLEPHRINE HYDROCHLORIDE 50 MCG: 10 INJECTION, SOLUTION INTRAMUSCULAR; INTRAVENOUS; SUBCUTANEOUS at 08:33

## 2018-08-08 RX ADMIN — FENTANYL CITRATE 50 MCG: 50 INJECTION, SOLUTION INTRAMUSCULAR; INTRAVENOUS at 09:03

## 2018-08-08 RX ADMIN — FENTANYL CITRATE 100 MCG: 50 INJECTION, SOLUTION INTRAMUSCULAR; INTRAVENOUS at 08:14

## 2018-08-08 RX ADMIN — DEXAMETHASONE SODIUM PHOSPHATE 4 MG: 4 INJECTION, SOLUTION INTRA-ARTICULAR; INTRALESIONAL; INTRAMUSCULAR; INTRAVENOUS; SOFT TISSUE at 08:57

## 2018-08-08 RX ADMIN — PROPOFOL 150 MG: 10 INJECTION, EMULSION INTRAVENOUS at 08:14

## 2018-08-08 RX ADMIN — FENTANYL CITRATE 50 MCG: 50 INJECTION, SOLUTION INTRAMUSCULAR; INTRAVENOUS at 09:35

## 2018-08-08 RX ADMIN — Medication 5 MG: at 08:56

## 2018-08-08 RX ADMIN — ACETAMINOPHEN 650 MG: 325 TABLET, FILM COATED ORAL at 20:29

## 2018-08-08 RX ADMIN — BUPIVACAINE 20 ML: 13.3 INJECTION, SUSPENSION, LIPOSOMAL INFILTRATION at 13:35

## 2018-08-08 RX ADMIN — Medication 5 MG: at 08:38

## 2018-08-08 RX ADMIN — ALBUTEROL SULFATE 6 PUFF: 90 AEROSOL, METERED RESPIRATORY (INHALATION) at 09:59

## 2018-08-08 RX ADMIN — DEXMEDETOMIDINE HYDROCHLORIDE 0.5 MCG/KG/HR: 100 INJECTION, SOLUTION INTRAVENOUS at 08:30

## 2018-08-08 RX ADMIN — ROCURONIUM BROMIDE 20 MG: 10 INJECTION INTRAVENOUS at 09:52

## 2018-08-08 RX ADMIN — FENTANYL CITRATE 50 MCG: 50 INJECTION, SOLUTION INTRAMUSCULAR; INTRAVENOUS at 12:24

## 2018-08-08 RX ADMIN — PHENYLEPHRINE HYDROCHLORIDE 100 MCG: 10 INJECTION, SOLUTION INTRAMUSCULAR; INTRAVENOUS; SUBCUTANEOUS at 12:18

## 2018-08-08 RX ADMIN — ROCURONIUM BROMIDE 50 MG: 10 INJECTION INTRAVENOUS at 08:15

## 2018-08-08 RX ADMIN — FENTANYL CITRATE 50 MCG: 50 INJECTION, SOLUTION INTRAMUSCULAR; INTRAVENOUS at 10:17

## 2018-08-08 RX ADMIN — CLINDAMYCIN PHOSPHATE 900 MG: 18 INJECTION, SOLUTION INTRAVENOUS at 08:44

## 2018-08-08 RX ADMIN — ONDANSETRON 4 MG: 2 INJECTION INTRAMUSCULAR; INTRAVENOUS at 12:07

## 2018-08-08 RX ADMIN — ROCURONIUM BROMIDE 10 MG: 10 INJECTION INTRAVENOUS at 11:10

## 2018-08-08 RX ADMIN — Medication 5 MG: at 08:31

## 2018-08-08 RX ADMIN — HUMAN INSULIN 4 UNITS/HR: 100 INJECTION, SOLUTION SUBCUTANEOUS at 20:21

## 2018-08-08 RX ADMIN — FUROSEMIDE 20 MG: 10 INJECTION, SOLUTION INTRAVENOUS at 21:28

## 2018-08-08 RX ADMIN — GABAPENTIN 1200 MG: 600 TABLET, FILM COATED ORAL at 20:29

## 2018-08-08 ASSESSMENT — PAIN DESCRIPTION - DESCRIPTORS
DESCRIPTORS: TENDER;SORE
DESCRIPTORS: SHARP;RADIATING
DESCRIPTORS: ACHING;SORE;SHOOTING

## 2018-08-08 ASSESSMENT — LIFESTYLE VARIABLES: TOBACCO_USE: 1

## 2018-08-08 ASSESSMENT — COPD QUESTIONNAIRES
CAT_SEVERITY: MILD
COPD: 1

## 2018-08-08 NOTE — ANESTHESIA PREPROCEDURE EVALUATION
Anesthesia Evaluation     . Pt has had prior anesthetic. Type: General and MAC           ROS/MED HX    ENT/Pulmonary:     (+)sleep apnea, tobacco use, Current use 1 PPD for 45 years packs/day  mild COPD, doesn't use CPAP , . .    Neurologic:     (+)neuropathy - Diabetic peripheral neuropathy,     Cardiovascular:  - neg cardiovascular ROS   (+) ----. : . . . :. . Previous cardiac testing date:results:date: results:ECG reviewed date:6/2018 results:NSR date: results:          METS/Exercise Tolerance:  1 - Eating, dressing   Hematologic: Comments: Pancytopenia requiring bone marrow biopsy    (+) Other Hematologic Disorder-Pancytopenia      Musculoskeletal:   (+) , , other musculoskeletal- right BKA      GI/Hepatic:        (-) GERD   Renal/Genitourinary:  - ROS Renal section negative       Endo: Comment: BMI 38    (+) type II DM Using insulin Diabetic complications: neuropathy, thyroid problem hypothyroidism, Obesity, .      Psychiatric:  - neg psychiatric ROS       Infectious Disease:   (+) MRSA,       Malignancy:         Other:    (+) No chance of pregnancy C-spine cleared: N/A, no H/O Chronic Pain,other significant disability Wheelchair bound                   Physical Exam  Normal systems: cardiovascular, pulmonary and dental    Airway   Mallampati: III  TM distance: >3 FB  Neck ROM: full    Dental     Cardiovascular       Pulmonary                     Anesthesia Plan      History & Physical Review  History and physical reviewed and following examination; no interval change.    ASA Status:  3 .    NPO Status:  > 8 hours    Plan for General with Intravenous induction. Maintenance will be Inhalation.    PONV prophylaxis:  Ondansetron (or other 5HT-3) and Dexamethasone or Solumedrol  Additional equipment: 2nd IV, Videolaryngoscope and Arterial Line      Postoperative Care  Postoperative pain management:  IV analgesics and Peripheral nerve block (Single Shot).      Consents  Anesthetic plan, risks, benefits and  alternatives discussed with:  Patient..              Procedure: Procedure(s):  DaVinci Assisted Total Laparoscopic Hysterectomy, Bilateral Salpingo-Oophorectomy, Cystoscopy, Possible Open, Possible Lymph Node Dissection, Anesthesia Block - Wound Class: II-Clean Contaminated   - Wound Class: II-Clean Contaminated   - Wound Class: II-Clean Contaminated    HPI: Fani Escobedo is a 63 year old female scheduled for laparoscopic hysterectomy for endometrial CA.  PMH also significant for anemia, thrombocytopenia - 40k on 07/19/18, will recheck this AM, COPD, h/o smoking, DMII, charcot joint, s/p R BKA, USHA, COPD, hypothyroid.  Per patient, has had emergence delirium with past anesthesia.  Pt to have post-op TAP block single shot for pain control.  Platelets to be available in OR.  Plan for GETA, standard ASA monitors, second IV, a-line, glidescope available for airway if necessary.    PMHx/PSHx:  Past Medical History:   Diagnosis Date     Anemia     iron deficiency     Charcot foot due to diabetes mellitus (H)     (R) foot -> BKA     COPD (chronic obstructive pulmonary disease) (H)      Depression      Diabetes mellitus (H)     type II     Hypothyroid      Obesity     bmi 46     USHA (obstructive sleep apnea)     does not use CPAP     Osteomyelitis (H)      Peripheral neuropathy      Tobacco abuse        Past Surgical History:   Procedure Laterality Date     AMPUTATE LEG BELOW KNEE  4/14/2012    Procedure:AMPUTATE LEG BELOW KNEE; right leg below knee amputation; Surgeon:WILMAR LUI; Location:[facility] OR     AMPUTATE TOE(S)  5/1/2013    Procedure: AMPUTATE TOE(S);  PARTIAL LEFT 3RD TOE AMPUTATION;  Surgeon: Juancarlos Gamez DPM;  Location:  OR     AMPUTATION      (L) 2nd toe     ANESTHESIA OUT OF OR MRI  6/11/2013    Procedure: ANESTHESIA OUT OF OR MRI;  MRI UNDER GENERAL ANESTHESIA;  Surgeon: Provider, Generic Anesthesia;  Location:  OR     ANESTHESIA OUT OF OR MRI  9/4/2013    Procedure: ANESTHESIA OUT OF OR MRI;   ANESTHESIA OUT OF OR MRI;  Surgeon: Provider, Generic Anesthesia;  Location: [facility] OR     BONE MARROW BIOPSY, BONE SPECIMEN, NEEDLE/TROCAR N/A 6/7/2018    Procedure: BIOPSY BONE MARROW;  bone marrow biopsy;  Surgeon: Farzana Teixeira MD;  Location: [facility]     DILATION AND CURETTAGE N/A 6/6/2018    Procedure: DILATION AND CURETTAGE;  DILATION AND CURETTAGE ;  Surgeon: Tip Ortega MD;  Location:  OR     EXPLORE SPINE, REMOVE HARDWARE, COMBINED  10/11/2013    Procedure: COMBINED EXPLORE SPINE, REMOVE HARDWARE;  EXPLORATION AND REVISION POSTERIOR THORACIC WOUND WITH WOUND VAC PLACEMENT.;  Surgeon: Thomas Ellis MD;  Location:  OR     FUSION SPINE ANTERIOR THORACIC ONE LEVEL  9/5/2013    Procedure: FUSION SPINE ANTERIOR THORACIC ONE LEVEL;  Right Thoracotomy For  T7-T8 Thorasic Vertebral Body Resection with Strut Graft, [place] Instrumentation T6-T9, BMP, Pyramesh and Intra-operative Neuro Monitoring ;  Surgeon: Thomas Ellis MD;  Location:  OR     INCISION AND DRAINAGE RECTUM, COMBINED  6/11/2012    Procedure: COMBINED INCISION AND DRAINAGE RECTUM;  I&D ABSCESS RIGHT BUTTOCK (MRSA );  Surgeon: Christos Linton MD;  Location:  OR     INCISION AND DRAINAGE SPINE, CLOSE WOUND, COMBINED  10/14/2013    Procedure: COMBINED INCISION AND DRAINAGE SPINE, CLOSE WOUND;  EXPLORATION/REVISION POSTERIOR THORACIC WOUND COMPLEX 15 CM. ;  Surgeon: Thomas Ellis MD;  Location:  OR     IRRIGATION AND DEBRIDEMENT TRUNK, COMBINED  6/10/2012    Procedure: COMBINED IRRIGATION AND DEBRIDEMENT TRUNK;  Incision and drainage abscess right buttock;  Surgeon: Christos Linton MD;  Location:  OR     OPTICAL TRACKING SYSTEM FUSION POSTERIOR SPINE THORACIC THREE+ LEVELS  9/6/2013    Procedure: OPTICAL TRACKING SYSTEM FUSION POSTERIOR SPINE THORACIC THREE+ LEVELS;  T4-T11 POSTERIOR LATERAL PEDICLE SCREW INSTRUMENTATION WITH IMAGE GUIDANCE AND NEURO-MONITORING;  Surgeon: Thomas Ellis  "MD Mehran;  Location: [facility] OR         No current facility-administered medications on file prior to encounter.   Current Outpatient Prescriptions on File Prior to Encounter:  GABAPENTIN PO Take 1,200 mg by mouth 3 times daily 2 x 600 mg tab   HYDROmorphone (DILAUDID) 4 MG tablet Take 1 tablet (4 mg) by mouth every 4 hours as needed (Patient taking differently: Take 4 mg by mouth every 4 hours as needed (\"Really bad pain\") )   Insulin Aspart (NOVOLOG SC) Inject 28 Units Subcutaneous daily (with breakfast)   Insulin Aspart (NOVOLOG SC) Inject 30 Units Subcutaneous daily (with lunch)   Insulin Aspart (NOVOLOG SC) Inject 28 Units Subcutaneous daily (with dinner)   insulin detemir (LEVEMIR FLEXPEN/FLEXTOUCH) 100 UNIT/ML injection Inject 42 Units Subcutaneous 2 times daily   Lactobacillus (ACIDOPHILUS PO) Take 1 capsule by mouth daily   METFORMIN HCL PO Take 1,000 mg by mouth 2 times daily (with meals)   B Complex Vitamins (VITAMIN  B COMPLEX) tablet Take 1 tablet by mouth daily.   ferrous sulfate (IRON) 325 (65 Fe) MG tablet Take 1 tablet (325 mg) by mouth 3 times daily (with meals)   LEVOTHYROXINE SODIUM PO Take 50 mcg by mouth daily    potassium chloride (KLOR-CON) 8 MEQ CR tablet Take 16 mEq by mouth 2 times daily AM and PM in addition to afternoon dose   sulfamethoxazole-trimethoprim (BACTRIM DS/SEPTRA DS) 800-160 MG per tablet Take 1 tablet by mouth 2 times daily       Social Hx:   Social History   Substance Use Topics     Smoking status: Current Some Day Smoker     Packs/day: 1.00     Years: 45.00     Smokeless tobacco: Never Used     Alcohol use Yes      Comment: occ       Allergies:   Allergies   Allergen Reactions     Adhesive Tape      Cephalosporins Anaphylaxis     Naproxen Anaphylaxis     Penicillins      Augmentin Rash     Benadryl [Anti-Itch] Rash     Morphine Hcl Itching and Rash         NPO Status: Per ASA Guidelines    Labs:    Blood Bank:  Lab Results   Component Value Date    ABO O 07/19/2018    RH " Pos 07/19/2018    AS Neg 07/19/2018     BMP:  Recent Labs   Lab Test  07/19/18   1354   NA  139   POTASSIUM  3.9   CHLORIDE  108   CO2  25   BUN  8   CR  0.53   GLC  139*   DESIREE  9.0     CBC:   Recent Labs   Lab Test  07/19/18   1354   WBC  2.9*   RBC  3.58*   HGB  11.9   HCT  35.8   MCV  100   MCH  33.2*   MCHC  33.2   RDW  14.3   PLT  40*     Coags:  Recent Labs   Lab Test  07/19/18   1354   06/05/18   1330   09/05/13 2005   INR  1.14   < >  1.18*   < >  1.11   PTT   --    --   35   < >  29   FIBR   --    --    --    --   550*    < > = values in this interval not displayed.       Zafar Woo MD  Staff Anesthesiologist  [phone]

## 2018-08-08 NOTE — ANESTHESIA POSTPROCEDURE EVALUATION
Patient: Fani Escobedo    Procedure(s):  DaVinci Assisted Total Laparoscopic Hysterectomy, Bilateral Salpingo-Oophorectomy, Cystoscopy, Bilateral Pelvic Lymph Node Dissection - Wound Class: II-Clean Contaminated   - Wound Class: II-Clean Contaminated    Diagnosis:Endometrial Cancer   Diagnosis Additional Information: No value filed.    Anesthesia Type:  General    Note:  Anesthesia Post Evaluation    Patient location during evaluation: PACU  Patient participation: Able to fully participate in evaluation  Level of consciousness: awake and alert  Pain management: adequate  Airway patency: patent  Cardiovascular status: acceptable  Respiratory status: acceptable  Hydration status: acceptable  PONV: none             Last vitals:  Vitals:    08/08/18 1345 08/08/18 1400 08/08/18 1415   BP: 136/74 131/76 133/79   Resp: 20 20 18   Temp:   35.4  C (95.7  F)   SpO2: 94% 95% 95%         Electronically Signed By: Zafar Woo MD  August 8, 2018  2:28 PM

## 2018-08-08 NOTE — OR NURSING
Post-op TAP block done from 4714-0626, Timeout done before procedure. Consent done. Patient sleepy throughout. Precedex drip turned off at the end of OR and oral airway still present. TAP block done without complications. VSS throughout.

## 2018-08-08 NOTE — OR NURSING
Unable to start pre-op until this time d/t surgeon and anesthesia and pain team at bedside.  Dr. Woo notified of pre-op BG of 200.  No new orders at this time.  Per JHONNY and Dr. Cortez verbal order for CBC with platelets and K/creat lab check pre-op.  Dr. Cortez stated will most likely be admit overnight, but pt's  Nixon will be able to give pt ride and stay with pt if discharged today.  Plan for pain block not to be done in pre-op, but OR or PACU.  Pre-op heparin given to CRNA.  D/t low platelets CRNA to check with surgeon prior to heparin administration.  Labs pending upon transfer to OR.

## 2018-08-08 NOTE — OR NURSING
Dr. Arellano, working with Dr. Cortez, notified of most recent coag results.  She said that we did not need to transfuse FFP at this time and they would continue to monitor her.

## 2018-08-08 NOTE — ANESTHESIA CARE TRANSFER NOTE
Patient: Fani Escobedo    Procedure(s):  DaVinci Assisted Total Laparoscopic Hysterectomy, Bilateral Salpingo-Oophorectomy, Cystoscopy, Bilateral Pelvic Lymph Node Dissection - Wound Class: II-Clean Contaminated   - Wound Class: II-Clean Contaminated    Diagnosis: Endometrial Cancer   Diagnosis Additional Information: No value filed.    Anesthesia Type:   General     Note:  Airway :Face Mask  Patient transferred to:PACU  Comments: Awake with good patent airway.  VSSHandoff Report: Identifed the Patient, Identified the Reponsible Provider, Reviewed the pertinent medical history, Discussed the surgical course, Reviewed Intra-OP anesthesia mangement and issues during anesthesia, Set expectations for post-procedure period and Allowed opportunity for questions and acknowledgement of understanding      Vitals: (Last set prior to Anesthesia Care Transfer)    CRNA VITALS  8/8/2018 1225 - 8/8/2018 1259      8/8/2018             Resp Rate (observed): (!)  3                Electronically Signed By: SHAUNA Law CRNA  August 8, 2018  12:59 PM

## 2018-08-08 NOTE — IP AVS SNAPSHOT
MRN:[id number]                      After Visit Summary   8/8/2018    Fani Escobedo    MRN: [id number]           Thank you!     Thank you for choosing [facility] for your care. Our goal is always to provide you with excellent care. Hearing back from our patients is one way we can continue to improve our services. Please take a few minutes to complete the written survey that you may receive in the mail after you visit with us. Thank you!        Patient Information     Date Of Birth          1955        Designated Caregiver       Most Recent Value    Caregiver    Will someone help with your care after discharge? yes    Name of designated caregiver Nixon Escobedo     Phone number of caregiver [phone]    Caregiver address [address] [place] [address]      About your hospital stay     You were admitted on:  August 8, 2018 You last received care in the:  [address] [facility]    You were discharged on:  August 10, 2018        Reason for your hospital stay       Robotic hysterectomy                  Who to Call     For medical emergencies, please call 911.  For non-urgent questions about your medical care, please call your primary care provider or clinic, [phone]  For questions related to your surgery, please call your surgery clinic        Attending Provider     Provider Specialty    Trudy Cortez MD Oncology    Delano, Lewis Lucero MD Oncology       Primary Care Provider Office Phone # Fax #    Marielos SAURAV Nicholson [phone]      After Care Instructions     Diet       Follow this diet upon discharge: Orders Placed This Encounter      Moderate Consistent CHO Diet            Discharge Instructions       GENERAL POST-OPERATIVE  PATIENT INSTRUCTIONS      FOLLOW-UP:    Call Surgeon if you have:  Temperature greater than 100.4  Persistent nausea and vomiting  Severe uncontrolled pain  Redness, tenderness, or signs of infection (pain, swelling, redness, odor or  green/yellow discharge around the site)  Difficulty breathing, headache or visual disturbances  Hives  Persistent dizziness or light-headedness  Extreme fatigue  Any other questions or concerns you may have after discharge    In an emergency, call 911 or go to an Emergency Department at a nearby hospital       WOUND CARE INSTRUCTIONS:  Keep a dry clean dressing on the wound if there is drainage. The initial bandage may be removed after 24 hours.  Once the wound has quit draining you may leave it open to air.  If clothing rubs against the wound or causes irritation and the wound is not draining you may cover it with a dry dressing during the daytime.  Try to keep the wound dry and avoid ointments on the wound unless directed to do so.  If the wound becomes bright red and painful or starts to drain infected material that is not clear, please contact your physician immediately.    1.  You may shower 48 hrs after surgery   2.  No soaking in the tub        DIET:  There are no dietary restrictions.  You may eat any foods that you can tolerate.  It is a good idea to eat a high fiber diet and take in plenty of fluids to prevent constipation.  If you do become constipated you may want to take a mild laxative or take ducolax tablets on a daily basis until your bowel habits are regular.  Constipation can be very uncomfortable, along with straining, after recent surgery.    ACTIVITY:  You are encouraged to cough and deep breath or use your incentive spirometer if you were given one, every 15-30 minutes when awake.  This will help prevent respiratory complications and low grade fevers post-operatively if you had a general anesthetic.  You may want to hug a pillow when coughing and sneezing to add additional support to the surgical area, if you had abdominal or chest surgery, which will decrease pain during these times.       1.  No heavy lifting >20lbs or strenuous exercise for six-eight weeks.  No exercise  in which you are  using core muscles (yoga, pilates, swimming, weight lifting)   2.  You may walk as much as you wish.  You are encouraged to increase your   activity each day after surgery.  Stairs are okay.    3.  Nothing per vagina for eight weeks.  No tampons, no intercourse, no    douching.  You can expect some light vaginal spotting and discharge for up to six   weeks.  If bleeding becomes heavy, please contact the office.     MEDICATIONS:  Try to take narcotic medications and anti-inflammatory medications, such as tylenol, ibuprofen, naprosyn, etc., with food.  This will minimize stomach upset from the medication.  Should you develop nausea and vomiting from the pain medication, or develop a rash, please discontinue the medication and contact your physician.  You should not drive, make important decisions, or operate machinery when taking narcotic pain medication.    OTHER:  Patients are often constipated after general anesthesia and surgery.  The patient should continue to take stool softeners (for example, Senokot-S) for the next six weeks and consume adequate amounts of water.  If the patient remains constipated or unable to pass stool, please try one or all of the following measures:  1.  Milk of Magnesia 30cc twice a day as needed by mouth  2.  Metamucil 2 tablespoons in 12 ounces of fluid  3.  Dulcolax oral or suppositories  4.  Prunes or prune juice  5.  Miralax daily      QUESTIONS:  Please feel free to call your physician or the hospital  if you have any questions, and they will be glad to assist you.                  Follow-up Appointments     Follow Up and recommended labs and tests       Follow up with:  Dr. Cortez on 8/27 at 12:30 at [facility]  Dr. Stinson with GI on 8/15 at 10:45 at the [facility]                   Your next 10 appointments already scheduled     Aug 15, 2018 11:00 AM CDT   (Arrive by 10:45 AM)   Return General Liver with Chapo Mendez MD   [facility] Hepatology ([facility]  "[facility])    [address]  [address]  [facility] [address] [phone]            Aug 27, 2018 12:30 PM CDT   Post Op with Trudy Cortez MD   [facility] ([facility])    [address] [address] [place] [address] [phone]              Additional Information     If you use hormonal birth control (such as the pill, patch, ring or implants): You'll need a second form of birth control for 7 days (condoms, a diaphragm or contraceptive foam). While in the hospital, you received a medicine called Bridion. Your normal birth control will not work as well for a week after taking this medicine.          Pending Results     Date and Time Order Name Status Description    8/9/2018 2300 Alpha 1 Antitrypsin In process     8/9/2018 2300 Mitochondrial M2 Antibody IgG In process     8/9/2018 2300 F Actin EIA with reflex In process     8/9/2018 2300 Anti Nuclear Kate IgG by IFA with Reflex In process     8/9/2018 1945 Urine Culture Aerobic Bacterial Preliminary     8/8/2018 1203 Plasma prepare order unit In process     8/8/2018 1027 Surgical pathology exam Preliminary             Statement of Approval     Ordered          08/10/18 1520  I have reviewed and agree with all the recommendations and orders detailed in this document.  EFFECTIVE NOW     Approved and electronically signed by:  Kristal Denise MD             Admission Information     Date & Time Provider Department Dept. Phone    8/8/2018 Lewis Segal MD [address] [facility] [place] [phone]      Your Vitals Were     Blood Pressure Pulse Temperature Respirations Height Weight    134/70 (BP Location: Right arm) 97 98.5  F (36.9  C) (Oral) 20 1.765 m (5' 9.5\") 125 kg (275 lb 9.2 oz)    Pulse Oximetry BMI (Body Mass Index)                97% 40.11 kg/m2          Care EveryWhere ID     This is your Care EveryWhere ID. This could be used by other organizations to access your [facility] " medical records  [id number]        Equal Access to Services     CANDACE BENITEZ : Hadii aad ku hadcarlytex Luis, basilia styles, qarebecaally goodwinmatadeo salinas, grace warner. So [facility] [phone].    ATENCIÓN: Si habla español, tiene a hayes disposición servicios gratuitos de asistencia lingüística. Llame al [phone].    We comply with applicable federal civil rights laws and [place] laws. We do not discriminate on the basis of race, color, national origin, age, disability, sex, sexual orientation, or gender identity.               Review of your medicines      START taking        Dose / Directions    acetaminophen 325 MG tablet   Commonly known as:  TYLENOL   Used for:  Endometrial cancer (H)        Dose:  650 mg   Take 2 tablets (650 mg) by mouth every 8 hours as needed for mild pain   Quantity:  30 tablet   Refills:  0       senna-docusate 8.6-50 MG per tablet   Commonly known as:  SENOKOT-S;PERICOLACE   Used for:  Endometrial cancer (H)        Dose:  2 tablet   Take 2 tablets by mouth 2 times daily as needed for constipation   Quantity:  100 tablet   Refills:  0         CONTINUE these medicines which may have CHANGED, or have new prescriptions. If we are uncertain of the size of tablets/capsules you have at home, strength may be listed as something that might have changed.        Dose / Directions    ferrous sulfate 325 (65 Fe) MG tablet   Commonly known as:  IRON   This may have changed:  when to take this   Used for:  Pancytopenia (H), Endometrial cancer (H), Excessive or frequent menstruation        Dose:  325 mg   Take 1 tablet (325 mg) by mouth daily   Quantity:  30 tablet   Refills:  0       HYDROmorphone 2 MG tablet   Commonly known as:  DILAUDID   This may have changed:    - medication strength  - how much to take  - when to take this  - reasons to take this   Used for:  Endometrial cancer (H)        Dose:  2-4 mg   Take 1-2 tablets (2-4 mg) by mouth every 6 hours as needed for  moderate to severe pain   Quantity:  10 tablet   Refills:  0         CONTINUE these medicines which have NOT CHANGED        Dose / Directions    ACIDOPHILUS PO        Dose:  1 capsule   Take 1 capsule by mouth daily   Refills:  0       GABAPENTIN PO        Dose:  1200 mg   Take 1,200 mg by mouth 3 times daily 2 x 600 mg tab   Refills:  0       KLOR-CON 8 MEQ CR tablet   Generic drug:  potassium chloride        Dose:  16 mEq   Take 16 mEq by mouth 2 times daily AM and PM in addition to afternoon dose   Refills:  0       LEVEMIR FLEXPEN/FLEXTOUCH 100 UNIT/ML injection   Generic drug:  insulin detemir        Dose:  42 Units   Inject 42 Units Subcutaneous 2 times daily   Refills:  0       LEVOTHYROXINE SODIUM PO        Dose:  50 mcg   Take 50 mcg by mouth daily   Refills:  0       METFORMIN HCL PO        Dose:  1000 mg   Take 1,000 mg by mouth 2 times daily (with meals)   Refills:  0       * NOVOLOG SC        Dose:  28 Units   Inject 28 Units Subcutaneous daily (with breakfast)   Refills:  0       * NOVOLOG SC        Dose:  30 Units   Inject 30 Units Subcutaneous daily (with lunch)   Refills:  0       * NOVOLOG SC        Dose:  28 Units   Inject 28 Units Subcutaneous daily (with dinner)   Refills:  0       sulfamethoxazole-trimethoprim 800-160 MG per tablet   Commonly known as:  BACTRIM DS/SEPTRA DS   Indication:  History of Osteomyelitis.        Dose:  1 tablet   Take 1 tablet by mouth 2 times daily   Refills:  0       vitamin B complex with vitamin C Tabs tablet   Commonly known as:  STRESS TAB        Dose:  1 tablet   Take 1 tablet by mouth daily.   Refills:  0       * Notice:  This list has 3 medication(s) that are the same as other medications prescribed for you. Read the directions carefully, and ask your doctor or other care provider to review them with you.         Where to get your medicines      These medications were sent to [facility] [place], [place] - [address]  "[facility] [address]     Phone:  [phone]     ferrous sulfate 325 (65 Fe) MG tablet    senna-docusate 8.6-50 MG per tablet         Some of these will need a paper prescription and others can be bought over the counter. Ask your nurse if you have questions.     You don't need a prescription for these medications     acetaminophen 325 MG tablet    HYDROmorphone 2 MG tablet                Protect others around you: Learn how to safely use, store and throw away your medicines at www.disposemymeds.org.        Information about your nerve block     Today you received a block to numb the nerves near your surgery site.    This is a block using local anesthetic or \"numbing\" medication injected around the nerves to anesthetize or \"numb\" the area supplied by those nerves. This block is injected into the muscle layer near your surgical site. The type of anesthesia (Exparel) your anesthesia team used to numb your abdomen may give you relief for up to 72 hours.     Diet: There are no diet restrictions, but you should drink plenty of fluids, unless you are on a fluid-restricted diet.     Activity: If your surgical site is an arm or leg you should be careful with your affected limb, since it is possible to injure your limb without being aware of it due to the numbing. Until full feeling returns, you should guard against bumping or hitting your limb, and avoid extreme hot or cold temperatures on the skin.    Pain Medication: As the block wears off, the feeling will return as a tingling or prickly sensation near your surgical site. You will experience more discomfort from your incisions as the feeling returns. You may want to take a pain pill (a narcotic or Tylenol if this was prescribed by your surgeon) when you start to experience mild pain, before the pain becomes more severe. If your pain medications do not control your pain, you should notify your surgeon. If you are taking narcotics for pain management, do not drink " alcohol, drive a car, or perform hazardous activities.  If you have questions or concerns you may call your surgeon at the number provided with your discharge instructions.     Call your surgeon if you experience blurry vision, ringing in the ears or metallic taste in your mouth.         Information about OPIOIDS     PRESCRIPTION OPIOIDS: WHAT YOU NEED TO KNOW   We gave you an opioid (narcotic) pain medicine. It is important to manage your pain, but opioids are not always the best choice. You should first try all the other options your care team gave you. Take this medicine for as short a time (and as few doses) as possible.    Some activities can increase your pain, such as bandage changes or therapy sessions. It may help to take your pain medicine 30 to 60 minutes before these activities. Reduce your stress by getting enough sleep, working on hobbies you enjoy and practicing relaxation or meditation. Talk to your care team about ways to manage your pain beyond prescription opioids.    These medicines have risks:    DO NOT drive when on new or higher doses of pain medicine. These medicines can affect your alertness and reaction times, and you could be arrested for driving under the influence (DUI). If you need to use opioids long-term, talk to your care team about driving.    DO NOT operate heavy machinery    DO NOT do any other dangerous activities while taking these medicines.    DO NOT drink any alcohol while taking these medicines.     If the opioid prescribed includes acetaminophen, DO NOT take with any other medicines that contain acetaminophen. Read all labels carefully. Look for the word  acetaminophen  or  Tylenol.  Ask your pharmacist if you have questions or are unsure.    You can get addicted to pain medicines, especially if you have a history of addiction (chemical, alcohol or substance dependence). Talk to your care team about ways to reduce this risk.    All opioids tend to cause constipation. Drink  plenty of water and eat foods that have a lot of fiber, such as fruits, vegetables, prune juice, apple juice and high-fiber cereal. Take a laxative (Miralax, milk of magnesia, Colace, Senna) if you don t move your bowels at least every other day. Other side effects include upset stomach, sleepiness, dizziness, throwing up, tolerance (needing more of the medicine to have the same effect), physical dependence and slowed breathing.    Store your pills in a secure place, locked if possible. We will not replace any lost or stolen medicine. If you don t finish your medicine, please throw away (dispose) as directed by your pharmacist. The [place] Pollution Control Agency has more information about safe disposal: https://www.pca.[facility].[place].us/living-green/managing-unwanted-medications             Medication List: This is a list of all your medications and when to take them. Check marks below indicate your daily home schedule. Keep this list as a reference.      Medications           Morning Afternoon Evening Bedtime As Needed    acetaminophen 325 MG tablet   Commonly known as:  TYLENOL   Take 2 tablets (650 mg) by mouth every 8 hours as needed for mild pain   Last time this was given:  650 mg on 8/8/2018  8:29 PM                                ACIDOPHILUS PO   Take 1 capsule by mouth daily   Next Dose Due:  8/11 8/11                       ferrous sulfate 325 (65 Fe) MG tablet   Commonly known as:  IRON   Take 1 tablet (325 mg) by mouth daily   Next Dose Due:  8/11 8/11                       GABAPENTIN PO   Take 1,200 mg by mouth 3 times daily 2 x 600 mg tab   Last time this was given:  1,200 mg on 8/10/2018  1:40 PM   Next Dose Due:  8/10 PM                        8/10           HYDROmorphone 2 MG tablet   Commonly known as:  DILAUDID   Take 1-2 tablets (2-4 mg) by mouth every 6 hours as needed for moderate to severe pain   Last time this was given:  2 mg on 8/10/2018 10:49 AM                                 KLOR-CON 8 MEQ CR tablet   Take 16 mEq by mouth 2 times daily AM and PM in addition to afternoon dose   Generic drug:  potassium chloride   Next Dose Due:  8/10 PM                    8/10               LEVEMIR FLEXPEN/FLEXTOUCH 100 UNIT/ML injection   Inject 42 Units Subcutaneous 2 times daily   Last time this was given:  42 Units on 8/10/2018  9:20 AM   Generic drug:  insulin detemir   Next Dose Due:  8/10 PM                    8/10               LEVOTHYROXINE SODIUM PO   Take 50 mcg by mouth daily   Last time this was given:  50 mcg on 8/10/2018  7:45 AM   Next Dose Due:  8/11 8/11                       METFORMIN HCL PO   Take 1,000 mg by mouth 2 times daily (with meals)   Last time this was given:  1,000 mg on 8/10/2018  7:45 AM   Next Dose Due:  8/10 supper                    8/10               * NOVOLOG SC   Inject 28 Units Subcutaneous daily (with breakfast)   Next Dose Due:  8/11 8/11                       * NOVOLOG SC   Inject 30 Units Subcutaneous daily (with lunch)                                * NOVOLOG SC   Inject 28 Units Subcutaneous daily (with dinner)   Next Dose Due:  8/10 supper                    8/10 supper               senna-docusate 8.6-50 MG per tablet   Commonly known as:  SENOKOT-S;PERICOLACE   Take 2 tablets by mouth 2 times daily as needed for constipation   Last time this was given:  1 tablet on 8/10/2018  9:20 AM   Next Dose Due:  May take 8/10 PM                    8/10               sulfamethoxazole-trimethoprim 800-160 MG per tablet   Commonly known as:  BACTRIM DS/SEPTRA DS   Take 1 tablet by mouth 2 times daily   Last time this was given:  1 tablet on 8/10/2018  7:45 AM   Next Dose Due:  8/10 PM                    8/10               vitamin B complex with vitamin C Tabs tablet   Commonly known as:  STRESS TAB   Take 1 tablet by mouth daily.   Next Dose Due:  8/11 8/11                       * Notice:  This list has 3 medication(s) that are the  same as other medications prescribed for you. Read the directions carefully, and ask your doctor or other care provider to review them with you.              More Information        Discharge Instructions for Laparoscopic Hysterectomy  You had a procedure called laparoscopic hysterectomy. A surgeon removed your uterus using instruments inserted through small incisions in your abdomen. These incisions may be sore. You may also have pain in your upper back or shoulders. This is from the gas used to enlarge your abdomen to allow your healthcare provider to see inside your pelvis and do the procedure. This pain usually goes away in a day or two. It usually takes from 1 to 4 weeks to recover from laparoscopic hysterectomy. Remember, though, that recovery time varies from woman to woman. Here's what you can do to speed your recovery after surgery.  Home care     Continue the coughing and deep breathing exercises that you learned in the hospital.    Take your medicines exactly as directed by your healthcare provider.    Avoid constipation.  ? Eat fruits, vegetables, and whole grains.  ? Drink 6 to 8 glasses of water a day, unless told to do otherwise.  ? Use a laxative or a mild stool softener if your doctor says it's OK.    Shower as usual. Wash your incisions with mild soap and water. Pat dry.    Don't use oils, powders, or lotions on your incisions.    Don't put anything in your vagina until your healthcare provider says it's safe to do so. Don't use tampons or douches. Don't have sex.    If you had both ovaries removed, report hot flashes, mood swings, and irritability to your healthcare provider. There may be medicines that can help you.  Activity    Ask your healthcare provider when you can start driving again. It's usually OK to drive as soon as you are free of pain and able to move comfortably from side to side. Don't drive while you are still taking opioid pain medicine.    Ask others to help with chores and  errands while you recover.    Don t lift anything heavier than 10 pounds for 6 weeks.    Don t vacuum or do other strenuous activities until the healthcare provider says it's OK.    Walk as often as you feel able.    Climb stairs slowly and pause after every few steps.  Follow-up care  Make a follow-up appointment, or as directed.     When to call your healthcare provider  Call your doctor right away if you have any of the following:    Fever above 100.4 F (38 C) or chills    Bright red vaginal bleeding or vaginal bleeding that soaks more than one sanitary pad per hour    A foul smelling discharge from the vagina    Trouble urinating or burning when you urinate    Severe pain or bloating in your abdomen    Redness, swelling, or drainage at your incision sites    Shortness of breath or chest pain    Nausea and vomiting   Date Last Reviewed: 11/1/2017 2000-2017 [employer] [address] [place] [address]. All rights reserved. This information is not intended as a substitute for professional medical care. Always follow your healthcare professional's instructions.                Discharge Instructions for Cirrhosis of the Liver  You have been diagnosed with cirrhosis of the liver. This is a long-term (chronic) problem. It occurs when liver tissue is destroyed and replaced by scar tissue. Causes of cirrhosis include:    Infection such as viral hepatitis    Chronic alcoholism    The body s immune system attacks healthy cells (autoimmune disorders)    Obesity    Medicine side effects    Genetic diseases  Sometimes the exact cause is unknown. You may not have any symptoms at first. Or your symptoms may be mild. But they usually get worse. Cirrhosis is likely to occur if you have a history of long-term alcohol abuse. Cirrhosis can t be cured. But it can be treated.   Home care  Alcohol    People with liver disease should not drink alcohol. If you stop drinking, you may feel better and live  longer.    If you are a chronic alcohol user, you will have withdrawal symptoms. Talk with your healthcare provider for more information.      If alcohol is a problem, ask your provider about medicine that can help you quit drinking.      Find a local Alcoholics Anonymous support group online at www.aa.org.  Diet    Ask your provider what kind of diet you should follow. You may be asked to limit or not eat certain foods. Do not limit your protein intake.    Weigh yourself daily and keep a weight log. If you have a sudden change in weight, call your provider.    Cut back on salt:  ? Limit canned, dried, packaged, and fast foods.  ? Don t add salt to your food at the table.  ? Season foods with herbs instead of salt when you cook.  Medicines, supplements, and vaccines    Take your medicines exactly as directed.    Talk to your provider before taking vitamins, over-the-counter medicines, or herbal supplements. Many herbal supplements may be poisonous (toxic) to the liver.    Avoid aspirin and other blood-thinning medicines.    Discuss vitamin supplements and deficiencies with your provider.    Ask your provider about getting vaccinations for viruses that can cause liver diseases.  Follow-up care  Follow up with your healthcare provider, or as advised. You will likely have the following tests:    Lab tests    Blood tests for liver cancer    Ultrasound of your liver every 6 months    Endoscopy to check for swollen veins (varices) in your digestive tract  When to call your provider  Call your healthcare provider right away if you have any of the following:    Fever of 100.4 F (38.0 C) or higher    Extreme tiredness (fatigue), weakness, or lack of appetite    Vomiting (with or without blood)    Yellowing of your skin or eyes (jaundice)    Itching    Swelling in your belly or legs    Black or tarry stools    Skin that bruises easily    Confusion or trouble thinking clearly   Date Last Reviewed: 8/1/2016 2000-2017 [employer]  [employer]. [address] [place] [address], [place] [address]. All rights reserved. This information is not intended as a substitute for professional medical care. Always follow your healthcare professional's instructions.

## 2018-08-08 NOTE — DISCHARGE SUMMARY
Gynecologic Oncology Discharge Summary    Fani Escobedo  [id number]    Admit Date: 8/8/2018  Discharge Date: 8/10/2018  Admitting Provider: Trudy Cortez MD  Discharge Provider: Lewis Segal MD    Admission Dx:   - Grade 1 endometrial cancer  - Thrombocytopenia  - T2DM  - COPD  - USHA  - hypothyroidism  - morbid obesity    Discharge Dx:  - Grade 2 Endometrioid Adenocarcinoma   - Thrombocytopenia  - T2DM  - COPD  - USHA  - hypothyroidism  - morbid obesity  Patient Active Problem List   Diagnosis     Sepsis (H)     Altered mental status     Osteomyelitis (H)     Acute respiratory failure (H)     Pancytopenia (H)     COPD (chronic obstructive pulmonary disease) (H)     Diabetes mellitus, type 2 (H)     Discitis     MRSA (methicillin resistant Staphylococcus aureus)     Acute postthoracotomy pain     Neuropathic pain     Wound disruption, post-op, skin     Postmenopausal vaginal bleeding     Thrombocytopenia (H)     Abnormal finding on ultrasound     Excessive or frequent menstruation     Endometrial cancer (H)     S/P hysterectomy       Procedures:  DaVinci assisted total laparoscopic hysterectomy, bilateral salpingo-oophorectomy, bilateral pelvic lymph node dissection, cystoscopy    Prior to Admission Medications:  Prescriptions Prior to Admission   Medication Sig Dispense Refill Last Dose     B Complex Vitamins (VITAMIN  B COMPLEX) tablet Take 1 tablet by mouth daily.   Past Week at Unknown time     GABAPENTIN PO Take 1,200 mg by mouth 3 times daily 2 x 600 mg tab   8/8/2018 at 0430     Insulin Aspart (NOVOLOG SC) Inject 28 Units Subcutaneous daily (with breakfast)   8/7/2018 at Unknown time     Insulin Aspart (NOVOLOG SC) Inject 30 Units Subcutaneous daily (with lunch)   8/7/2018 at Unknown time     Insulin Aspart (NOVOLOG SC) Inject 28 Units Subcutaneous daily (with dinner)   8/7/2018 at Unknown time     insulin detemir (LEVEMIR FLEXPEN/FLEXTOUCH) 100 UNIT/ML injection Inject 42 Units Subcutaneous 2 times daily    "8/8/2018 at 0430     Lactobacillus (ACIDOPHILUS PO) Take 1 capsule by mouth daily   8/7/2018 at pm     LEVOTHYROXINE SODIUM PO Take 50 mcg by mouth daily    8/7/2018 at 0430     METFORMIN HCL PO Take 1,000 mg by mouth 2 times daily (with meals)   8/7/2018 at pm     potassium chloride (KLOR-CON) 8 MEQ CR tablet Take 16 mEq by mouth 2 times daily AM and PM in addition to afternoon dose   8/7/2018 at pm     sulfamethoxazole-trimethoprim (BACTRIM DS/SEPTRA DS) 800-160 MG per tablet Take 1 tablet by mouth 2 times daily   8/8/2018 at 0430     [DISCONTINUED] HYDROmorphone (DILAUDID) 4 MG tablet Take 1 tablet (4 mg) by mouth every 4 hours as needed (Patient taking differently: Take 4 mg by mouth every 4 hours as needed (\"Really bad pain\") ) 90 tablet 0 Past Month at Unknown time       Discharge Medications:     Review of your medicines      START taking       Dose / Directions    acetaminophen 325 MG tablet   Commonly known as:  TYLENOL   Used for:  Endometrial cancer (H)        Dose:  650 mg   Take 2 tablets (650 mg) by mouth every 8 hours as needed for mild pain   Quantity:  30 tablet   Refills:  0       senna-docusate 8.6-50 MG per tablet   Commonly known as:  SENOKOT-S;PERICOLACE   Used for:  Endometrial cancer (H)        Dose:  2 tablet   Take 2 tablets by mouth 2 times daily as needed for constipation   Quantity:  100 tablet   Refills:  0         CONTINUE these medicines which may have CHANGED, or have new prescriptions. If we are uncertain of the size of tablets/capsules you have at home, strength may be listed as something that might have changed.       Dose / Directions    ferrous sulfate 325 (65 Fe) MG tablet   Commonly known as:  IRON   This may have changed:  when to take this   Used for:  Pancytopenia (H), Endometrial cancer (H), Excessive or frequent menstruation        Dose:  325 mg   Take 1 tablet (325 mg) by mouth daily   Quantity:  30 tablet   Refills:  0       HYDROmorphone 2 MG tablet   Commonly known " as:  DILAUDID   This may have changed:    - medication strength  - how much to take  - when to take this  - reasons to take this   Used for:  Endometrial cancer (H)        Dose:  2-4 mg   Take 1-2 tablets (2-4 mg) by mouth every 6 hours as needed for moderate to severe pain   Quantity:  10 tablet   Refills:  0         CONTINUE these medicines which have NOT CHANGED       Dose / Directions    ACIDOPHILUS PO        Dose:  1 capsule   Take 1 capsule by mouth daily   Refills:  0       GABAPENTIN PO        Dose:  1200 mg   Take 1,200 mg by mouth 3 times daily 2 x 600 mg tab   Refills:  0       KLOR-CON 8 MEQ CR tablet   Generic drug:  potassium chloride        Dose:  16 mEq   Take 16 mEq by mouth 2 times daily AM and PM in addition to afternoon dose   Refills:  0       LEVEMIR FLEXPEN/FLEXTOUCH 100 UNIT/ML injection   Generic drug:  insulin detemir        Dose:  42 Units   Inject 42 Units Subcutaneous 2 times daily   Refills:  0       LEVOTHYROXINE SODIUM PO        Dose:  50 mcg   Take 50 mcg by mouth daily   Refills:  0       METFORMIN HCL PO        Dose:  1000 mg   Take 1,000 mg by mouth 2 times daily (with meals)   Refills:  0       * NOVOLOG SC        Dose:  28 Units   Inject 28 Units Subcutaneous daily (with breakfast)   Refills:  0       * NOVOLOG SC        Dose:  30 Units   Inject 30 Units Subcutaneous daily (with lunch)   Refills:  0       * NOVOLOG SC        Dose:  28 Units   Inject 28 Units Subcutaneous daily (with dinner)   Refills:  0       sulfamethoxazole-trimethoprim 800-160 MG per tablet   Commonly known as:  BACTRIM DS/SEPTRA DS   Indication:  History of Osteomyelitis.        Dose:  1 tablet   Take 1 tablet by mouth 2 times daily   Refills:  0       vitamin B complex with vitamin C Tabs tablet   Commonly known as:  STRESS TAB        Dose:  1 tablet   Take 1 tablet by mouth daily.   Refills:  0       * Notice:  This list has 3 medication(s) that are the same as other medications prescribed for you. Read  the directions carefully, and ask your doctor or other care provider to review them with you.         Where to get your medicines      These medications were sent to [facility] - [place], [place] - [address]  [address], [facility] [address]     Phone:  [phone]      ferrous sulfate 325 (65 Fe) MG tablet     senna-docusate 8.6-50 MG per tablet         Some of these will need a paper prescription and others can be bought over the counter. Ask your nurse if you have questions.     You don't need a prescription for these medications      acetaminophen 325 MG tablet     HYDROmorphone 2 MG tablet             Consultations:  Gastroenterology  Physical Therapy  Wound/Ostomy Nurse    Brief History of Illness:  Fani Escobedo is a 63-year-old female with a 2-year history of post-menopausal uterine bleeding. This summer the bleeding worsened, becoming constant with large clots, associated with lightheadedness and fatigue. She also experienced an unintentional 10 lbs weight loss over the last 4 months. An US showed a thickened endometrial stripe of 18 mm, and a subsequent D & C on 6/6/2018 revealed grade 1 endometrioid adenocarcinoma. The patient declined hormonal management options and elected to proceed with surgery.    Hospital Course:  Dz:   - Preoperative diagnosis was grade 1 endometrial cancer.  Frozen section at the time of the surgery showed FIGO grade 2 endometrioid adenocarcinoma, 2.6 cm, less than 50% myoinvasion. Final pathology is pending at the time of discharge.  She will follow-up postoperatively for a care plan.  FEN:   - She was maintained on IVF until POD#1, when her diet was slowly advanced.  By discharge, she was tolerating a regular diet without nausea and vomiting and able to maintain her hydration without IVF supplementation.  Pain:   - Her pain was initially controlled with PRN IV Dilaudid. Once tolerating PO pain meds, she was transitioned to a PO pain regimen.  Her  pain was well controlled on this and she was discharged home with these medications.  CV:   - She has no history of CV issues.  Her vital signs were stable while in house and she had no acute CV issues.  PULM:   - She has a history of USHA, COPD and is a current everyday smoker with a 45 pack year smoking history. She was initially given O2 supplementation in to maintain her O2 sats in the immediate postop period. Her oxygen requirements increased to 7L postoperatively, she had diffuse wheezing and bibasilar crackles on lung exam and a CXR showed bilateral pulmonary edema-- she was given Lasix and her IVF were discontinued. She received duonebs with moderate improvement in symptoms. On POD#1 she continued to have diffuse wheezing but no crackles on exam, and her oxygen requirement was reduced to 3L and eventually she was weaned to RA. By discharge, her O2 sats were greater than 94% on RA.  She was encouraged to use her bedside IS while in house.  She had no acute pulmonary issues while in house.  HEME:  - She has a chronic thrombocytopenia of unclear origin. Her platelet count was 39 preoperatively.  Intraoperatively her platelet count dropped to 29 and she received 4U of platelets. Postoperatively her platelet count was 44. She received two additional units of platelets and on recheck her platelets were 34>31>29. She was given two additional units of platelets on POD#1 and her platelets were stable at 34 at time of discharge.  - Her preoperative Hgb was 11.3.  Her hgb dropped to 10.6 intraoperatively and to 9.8 postoperatively. Her hgb was stable at 9.5 at the time of discharge.  She had no other acute heme issues while in house.  GI:  - On laparoscopy, her liver appeared cirrhotic. GI/Hepatology was consulted on postoperatively.  They recommended additional workup for cause. Her transaminases, alkaline phosphatase, and bilirubin were normal. Her albumin was low at 2.9 as was her total protein at 5.9. Her INR was  stable at 1.21>1.29>1.27>1.3>1.27. Hepatitis B surface antigen and Hepatitis C Antibody testing were non-reactive.Additional autoimmune testing was pending at time of discharge.  She was to follow-up with GI as an outpatient.    - She was made NPO prior to the procedure.  On POD#0, her diet was advanced to clear liquids and then advanced slowly as tolerated.  At the time of discharge, she was tolerating a regular diet without nausea and vomiting.  She will be discharged with a bowel regimen to prevent constipation in the postoperative period.  She had no acute GI issues while in house.  :    - A pierre catheter was placed at the time of the surgery and removed in the OR. POD#0 4-6 hours, the patient was unable to void and bladder scanned for 700 ml. Pierre was replaced on POD#0. The pierre catheter was removed on POD#1 and she was able to void spontaneously. UA was collected on POD#1 due to patient reporting symptoms of urgency and dysuria, appears contaminated but will follow up urine culture that was pending at time of discharge.  Prior to discharge, the patient was voiding spontaneously without difficulty.   ID:   - The patient was febrile to 102.1F on POD#0 2/2 to either atelectasis or febrile transfusion reaction.  She remained afebrile for >24hrs prior to discharge. She received standard preoperative antibiotics without incident.  She was continued on home chronic bactrim for history of osteomyelitis.  ENDO:   - She has a history of T2DM complicated by peripheral neuropathy, left Charcot foot, right BKA. Her home regimen includes Novalog 28U/30U/28U, Detemir 42U BID, and metformin 1000 mg BID. She was placed on high dose sliding scale insulin on POD#0, this provided inadequate coverage so she was placed on an insulin drip. On POD#1 the insulin drip was discontinued and she was transitioned to Detemir 42 mg BID, metformin 1000 mg BID, and high dose sliding scale insulin. Her prandial insulin was introduced once  tolerating PO meals. She was discharged on her home regimen.  - She also has a history of hypothyroidism and was continued on her home levothyroxine 50 mcg while inpatient.  PSYCH/NEURO:  - She has a history of depression, but is on no medications.   - She has a history of chronic pain and was continued on her home gabapentin 1200 mg BID while inpatient.   PPX:    - She was given SCDs, IS during her hospital course.  She tolerated these prophylactic interventions without incident.  They were discontinued at the time of her discharge. She was not given ppx Lovenox due to her significant thrombocytopenia.     Discharge Instructions and Follow up:  Ms. Fani Escobedo was discharged from the hospital with follow up with Dr. Cortez on 8/27 at [facility], followup with GI-Hepatology on 8/15 at 10:30 with Dr. Stinson.    Discharge Diet: Regular  Discharge Activity: No lifting, driving, or strenuous exercise for 6 week(s)  Pelvic rest: abstain from intercourse and do not use tampons for 6 week(s)  Discharge Follow up: Dr. Cortez post op follow up on 8/27  Dr. Stinson GI/Hepatology follow up on 8/15    Discharge Disposition:  Discharged to home    Discharge Staff: MD Kristal Sweeney MD  PGY4    I have examined this patient personally with the team and agree with the above findings and plan.    Lewis Segal

## 2018-08-08 NOTE — OP NOTE
Operative Report    Preoperative Diagnosis:   Grade 1 endometrial cancer, morbid obesity,    Postoperative Diagnosis:  Grade 2 Endometrioid adenocarcinoma, less than 50% myoinvasion, morbid obesity    Procedure:  Robotic assisted total laparoscopic hysterectomy, bilateral salpingo-oophorectomy, bilateral pelvic lymph node dissection, washings, cystoscopy    Surgeon: Trudy Cortez MD  Assistants: Dee Dee Arellano, GYN ONC fellow  Kristal Denise, PGY4  Chapo Montgomery, MS4    Specimen:  Uterus, cervix, right and left fallopian tubes, right and left ovaries, bilateral pelvic lymph nodes    EBL: 200 mL  IVF: 1500 mL crystalloid, 250 mL colloid, 3 units platelets  UOP: 200 mL    Indication: Fani Escobedo is a 64 yo who initially presented to the ED with vaginal bleeding.  Subsequent D&C with diagnosis of grade 1 endometrioid adenocarcinoma of the uterus. Discussion was had given numerous comorbidities regarding treatment options which included surgical management vs hormonal management.  Patient desired surgical management.  Risks, benefits and alternatives were discussed.     Findings:  EUA with small mobile uterus. On laparoscopy, cirrhotic liver.  Small normal appearing uterus, normal appearing bilateral fallopian tubes and ovaries.  Difficulty with visualization due to large amount of bowel and obese abdomen. Bowel was difficult to pull into the upper abdomen.  Oozing at end of case, surgiflo and surgicel used on surgical sites with adequate hemostasis achieved. Cystoscopy with bilateral efflux from the ureteral openings and no evidence of bladder injury.    Procedure:    The patient was identified in the preoperative holding area and consent for surgery was confirmed. She was then taken to the operating room where she was successfully intubated by anesthesia.  She was then position in dorsal lithotomy position with yellow-fin stirrups and bilateral arms tucked.  After position was confirmed, the patient was then  prepped and draped in the usual sterile fashion.  A safety timeout was performed.  A pierre catheter was placed into the bladder.  A medium graves speculum was placed inside the vagina and the cervix easily visualized.  A medium sized v-care uterine manipulator was inserted into the endocervical canal following serial dilation of the cervix. 4 ml total of ICG was placed at 3 and 9 oclock however the Firefly did not work on the DaVinci.     Attention was turned to the abdomen where an Allis clamp was used to elevated the umbilicus and bilateral everted edges were grasped with perforating towel clamps.  A stab incision was made at the umbilicus and a Veress needle was inserted to obtained entry into the abdomen.  Intraabdominal placement was verified with a saline drop test.  The CO2 was attached and opening pressure was 4 mmHg.  Once a pneumoperitoneum of 15 mmHg was reached the Veress needle was removed. An incision was made in the midline, directly superior to the umbilicus.  A 8 mm trochar was then placed through this incision. The 8mm 0 Degree DaVinci camera was then placed through this trocar and the abdominal cavity was viewed in its entirety with findings as noted above. The patient was placed in steep Trendelenburg position and the robotic trochars placed under direct vision. Trocar #1 was placed under direct vision lateral to and at the same level as the camera trocar on the right side. Robotic trocar #2 was placed in the mid-clavicular line on the patient's left side. Robotic trocar #3 was placed approximately lateral to the same the #2 trocar on the left side. Finally, a 12mm assistant trochar was placed in the mid-clavicular line approximately 2cm superior to the camera trochar on the patient's right side. Bowel graspers were used to gently sweep the bowel into the upper abdomen.  The robot was then docked from the patient's left side without difficulty. Instruments were placed with monopolar scissors in  Trocar #1, bipolar graspers in Trocar #2, Prograsp in Trochar #3.     The bowel was retracted with the laparoscopic fan. The left uterine cornua was placed on traction using the Prograsp. The round ligament was identified, grasped and incised using a combination of bipolar and monopolar cautery allowing entry into the retroperitoneal space. The ureter was identified, the infundibulopelvic ligament skeletonized, coagulated and transected using a combination of bipolar and monopolar cautery.  The anterior leaf of the broad ligament was taken down to create a bladder flap.  The uterine artery was then skeletonized and the uterine artery was subsequently coagulated and transected using a combination of bipolar and monopolar cautery.  The same was performed on the right.  The round ligament was transected, the ureter identified, the IP ligament skeletonized and transected, and the anterior leaf of the broad ligament taken down to finish creating the bladder flap.  The right uterine artery was skeletonized and subsequently coagulated and transected.  The bladder was further taken down from the cervix using monopolar and blunt dissection.  The cardinal ligaments were serially coagulated and transected until the v-care ring was identified.  The uterus was then placed on traction in a cephalad direction and circumferential vaginotomy incision was made using monopolar heat.  Once completed, the specimen was removed through the vagina. The pelvis was irrigated thoroughly, hemostasis was confirmed. The vaginal cuff was closed with 2 running V-lock sutures, incorporating bilateral uterosacral ligaments into the vaginal apex with the closure.     At this time frozen pathology returned with a grade 2 endometrioid adenocarcinoma measuring 2.6 cm with less then 50% myoinvasion. The decision was made to proceed with pelvic lymph node dissection. The peritoneum was opened over the left external iliac artery. The arturo tissue was  dissected from the medial portion of the psoas muscle from lateral to medial over the top of the external artery and vein. The obturator nerve was identified; arturo tissue above the nerve and below the vein was elevated and removed from this space. The superior vesicle was identified and arturo tissue superior and medial to this was lifted and massaged out of the arturo bed. This was removed via endocatch bag through the 12mm assistant port. The same procedure was repeated on the right side.    A cystoscopy was performed at the end of the procedure after administration of pyridium pre-operatively. The Brice catheter was removed, a 70 degree cystoscope was inserted and the bladder filled. The bilateral ureteral orifices were identified. Vigorous orange efflux was noted from the both sides. The bladder was drained and the cystoscope removed.    Surgicel and Suriflo was placed over the vaginal cuff and in the area of the lymph node dissection. All instruments were removed. The robot was then undocked. The fascia of the assistant port site was closed with the Eric Ocrdelia and 0-Vicryl suture. The abdomen was desufflated and the trocars removed. The trocar skin sites closed with 4-0 Vicryl then covered with steri-strips and a bandage. Sponge, instrument and needle count were correct x 2 at completion of the procedure. The patient was woken, extubated and returned to the recovery room in stable condition.     Dr. Cortez was scrubbed for the entire procedure. This procedure took twice as long due to the patient's morbid obesity and difficulty with manipulation of the bowels.    Kristal Denise MD  OB/GYN PGY4    Trudy Cortez MD    Department of Ob/Gyn and Women's Health  Division of Gynecologic Oncology  [facility]  [phone]    I was scrubbed and present for the entire procedure.

## 2018-08-08 NOTE — ANESTHESIA PROCEDURE NOTES
Peripheral Nerve Block Procedure Note    Staff:     Anesthesiologist:  STEVE HIGUERA    Resident/CRNA:  CARLOS OREILLY    Block performed by resident/CRNA in the presence of a teaching physician    Location: PACU  Procedure Start/Stop TImes:      8/8/2018 1:25 PM     8/8/2018 1:40 PM    patient identified, IV checked, site marked, risks and benefits discussed, informed consent, monitors and equipment checked, pre-op evaluation, at physician/surgeon's request and post-op pain management      Correct Patient: Yes      Correct Position: Yes      Correct Site: Yes      Correct Procedure: Yes      Correct Laterality:  Yes    Site Marked:  Yes  Procedure details:     Procedure:  TAP    ASA:  3    Laterality:  Bilateral    Position:  Supine    Sterile Prep: chloraprep, patient draped, mask and sterile gloves      Needle:  Insulated    Needle gauge:  20    Needle length (inches):  3.13    Needle length (mm):  80    Ultrasound: Yes      Ultrasound used to identify targeted nerve, plexus, or vascular structure and placed a needle adjacent to it      Permanent Image entered into patiient's record      Abnormal pain on injection: No      Blood Aspirated: No      Paresthesias:  No    Bleeding at site: No      Bolus via:  Needle    Infusion Method:  Single Shot    Complications:  None

## 2018-08-08 NOTE — OR NURSING
MDA notified of blood sugar of 239 and is in the middle of a procedure and not able to do anything about it now.

## 2018-08-08 NOTE — IP AVS SNAPSHOT
[address] [address]    Phone:  [phone]                                       After Visit Summary   8/8/2018    Fani Escobedo    MRN: [id number]           After Visit Summary Signature Page     I have received my discharge instructions, and my questions have been answered. I have discussed any challenges I see with this plan with the nurse or doctor.    ..........................................................................................................................................  Patient/Patient Representative Signature      ..........................................................................................................................................  Patient Representative Print Name and Relationship to Patient    ..................................................               ................................................  Date                                            Time    ..........................................................................................................................................  Reviewed by Signature/Title    ...................................................              ..............................................  Date                                                            Time

## 2018-08-09 ENCOUNTER — APPOINTMENT (OUTPATIENT)
Dept: PHYSICAL THERAPY | Facility: CLINIC | Age: 63
DRG: 740 | End: 2018-08-09
Attending: OBSTETRICS & GYNECOLOGY
Payer: COMMERCIAL

## 2018-08-09 LAB
ALBUMIN SERPL-MCNC: 2.9 G/DL (ref 3.4–5)
ALBUMIN UR-MCNC: 30 MG/DL
ALP SERPL-CCNC: 56 U/L (ref 40–150)
ALT SERPL W P-5'-P-CCNC: 36 U/L (ref 0–50)
ANION GAP SERPL CALCULATED.3IONS-SCNC: 7 MMOL/L (ref 3–14)
APPEARANCE UR: CLEAR
AST SERPL W P-5'-P-CCNC: 45 U/L (ref 0–45)
BACTERIA #/AREA URNS HPF: ABNORMAL /HPF
BILIRUB DIRECT SERPL-MCNC: 0.2 MG/DL (ref 0–0.2)
BILIRUB SERPL-MCNC: 0.7 MG/DL (ref 0.2–1.3)
BILIRUB UR QL STRIP: NEGATIVE
BLD PROD TYP BPU: NORMAL
BLD UNIT ID BPU: 0
BLOOD PRODUCT CODE: NORMAL
BPU ID: NORMAL
BUN SERPL-MCNC: 17 MG/DL (ref 7–30)
CALCIUM SERPL-MCNC: 7.8 MG/DL (ref 8.5–10.1)
CHLORIDE SERPL-SCNC: 106 MMOL/L (ref 94–109)
CO2 SERPL-SCNC: 27 MMOL/L (ref 20–32)
COLOR UR AUTO: ABNORMAL
COPATH REPORT: NORMAL
CREAT SERPL-MCNC: 0.58 MG/DL (ref 0.52–1.04)
ERYTHROCYTE [DISTWIDTH] IN BLOOD BY AUTOMATED COUNT: 14.7 % (ref 10–15)
ERYTHROCYTE [DISTWIDTH] IN BLOOD BY AUTOMATED COUNT: 14.8 % (ref 10–15)
GFR SERPL CREATININE-BSD FRML MDRD: >90 ML/MIN/1.7M2
GLUCOSE BLDC GLUCOMTR-MCNC: 140 MG/DL (ref 70–99)
GLUCOSE BLDC GLUCOMTR-MCNC: 148 MG/DL (ref 70–99)
GLUCOSE BLDC GLUCOMTR-MCNC: 152 MG/DL (ref 70–99)
GLUCOSE BLDC GLUCOMTR-MCNC: 166 MG/DL (ref 70–99)
GLUCOSE BLDC GLUCOMTR-MCNC: 187 MG/DL (ref 70–99)
GLUCOSE BLDC GLUCOMTR-MCNC: 204 MG/DL (ref 70–99)
GLUCOSE BLDC GLUCOMTR-MCNC: 216 MG/DL (ref 70–99)
GLUCOSE BLDC GLUCOMTR-MCNC: 239 MG/DL (ref 70–99)
GLUCOSE BLDC GLUCOMTR-MCNC: 241 MG/DL (ref 70–99)
GLUCOSE BLDC GLUCOMTR-MCNC: 255 MG/DL (ref 70–99)
GLUCOSE BLDC GLUCOMTR-MCNC: 263 MG/DL (ref 70–99)
GLUCOSE BLDC GLUCOMTR-MCNC: 315 MG/DL (ref 70–99)
GLUCOSE BLDC GLUCOMTR-MCNC: 331 MG/DL (ref 70–99)
GLUCOSE SERPL-MCNC: 168 MG/DL (ref 70–99)
GLUCOSE UR STRIP-MCNC: 300 MG/DL
HBV SURFACE AG SERPL QL IA: NONREACTIVE
HCT VFR BLD AUTO: 29.4 % (ref 35–47)
HCT VFR BLD AUTO: 29.5 % (ref 35–47)
HCV AB SERPL QL IA: NONREACTIVE
HGB BLD-MCNC: 9.4 G/DL (ref 11.7–15.7)
HGB BLD-MCNC: 9.4 G/DL (ref 11.7–15.7)
HGB UR QL STRIP: ABNORMAL
INR PPP: 1.27 (ref 0.86–1.14)
INR PPP: 1.3 (ref 0.86–1.14)
KETONES UR STRIP-MCNC: 10 MG/DL
LEUKOCYTE ESTERASE UR QL STRIP: ABNORMAL
MCH RBC QN AUTO: 32 PG (ref 26.5–33)
MCH RBC QN AUTO: 32.1 PG (ref 26.5–33)
MCHC RBC AUTO-ENTMCNC: 31.9 G/DL (ref 31.5–36.5)
MCHC RBC AUTO-ENTMCNC: 32 G/DL (ref 31.5–36.5)
MCV RBC AUTO: 100 FL (ref 78–100)
MCV RBC AUTO: 100 FL (ref 78–100)
MUCOUS THREADS #/AREA URNS LPF: PRESENT /LPF
NITRATE UR QL: NEGATIVE
NUM BPU REQUESTED: 2
PH UR STRIP: 6 PH (ref 5–7)
PLATELET # BLD AUTO: 29 10E9/L (ref 150–450)
PLATELET # BLD AUTO: 33 10E9/L (ref 150–450)
POTASSIUM SERPL-SCNC: 3.9 MMOL/L (ref 3.4–5.3)
PROT SERPL-MCNC: 5.9 G/DL (ref 6.8–8.8)
RBC # BLD AUTO: 2.93 10E12/L (ref 3.8–5.2)
RBC # BLD AUTO: 2.94 10E12/L (ref 3.8–5.2)
RBC #/AREA URNS AUTO: 73 /HPF (ref 0–2)
SODIUM SERPL-SCNC: 140 MMOL/L (ref 133–144)
SOURCE: ABNORMAL
SP GR UR STRIP: 1.02 (ref 1–1.03)
SQUAMOUS #/AREA URNS AUTO: 3 /HPF (ref 0–1)
TRANS CELLS #/AREA URNS HPF: <1 /HPF (ref 0–1)
TRANSFUSION STATUS PATIENT QL: NORMAL
UROBILINOGEN UR STRIP-MCNC: 2 MG/DL (ref 0–2)
WBC # BLD AUTO: 3.2 10E9/L (ref 4–11)
WBC # BLD AUTO: 3.5 10E9/L (ref 4–11)
WBC #/AREA URNS AUTO: 27 /HPF (ref 0–5)

## 2018-08-09 PROCEDURE — 80076 HEPATIC FUNCTION PANEL: CPT | Performed by: STUDENT IN AN ORGANIZED HEALTH CARE EDUCATION/TRAINING PROGRAM

## 2018-08-09 PROCEDURE — 85027 COMPLETE CBC AUTOMATED: CPT | Performed by: STUDENT IN AN ORGANIZED HEALTH CARE EDUCATION/TRAINING PROGRAM

## 2018-08-09 PROCEDURE — 94640 AIRWAY INHALATION TREATMENT: CPT | Mod: 76

## 2018-08-09 PROCEDURE — 40000193 ZZH STATISTIC PT WARD VISIT

## 2018-08-09 PROCEDURE — 87186 SC STD MICRODIL/AGAR DIL: CPT | Performed by: OBSTETRICS & GYNECOLOGY

## 2018-08-09 PROCEDURE — 25000131 ZZH RX MED GY IP 250 OP 636 PS 637: Performed by: OBSTETRICS & GYNECOLOGY

## 2018-08-09 PROCEDURE — 85610 PROTHROMBIN TIME: CPT | Performed by: STUDENT IN AN ORGANIZED HEALTH CARE EDUCATION/TRAINING PROGRAM

## 2018-08-09 PROCEDURE — 12000006 ZZH R&B IMCU INTERMEDIATE UMMC

## 2018-08-09 PROCEDURE — 25000125 ZZHC RX 250: Performed by: ANESTHESIOLOGY

## 2018-08-09 PROCEDURE — 40000275 ZZH STATISTIC RCP TIME EA 10 MIN

## 2018-08-09 PROCEDURE — 87088 URINE BACTERIA CULTURE: CPT | Performed by: OBSTETRICS & GYNECOLOGY

## 2018-08-09 PROCEDURE — 86803 HEPATITIS C AB TEST: CPT | Performed by: STUDENT IN AN ORGANIZED HEALTH CARE EDUCATION/TRAINING PROGRAM

## 2018-08-09 PROCEDURE — 87340 HEPATITIS B SURFACE AG IA: CPT | Performed by: STUDENT IN AN ORGANIZED HEALTH CARE EDUCATION/TRAINING PROGRAM

## 2018-08-09 PROCEDURE — 81001 URINALYSIS AUTO W/SCOPE: CPT | Performed by: OBSTETRICS & GYNECOLOGY

## 2018-08-09 PROCEDURE — 36415 COLL VENOUS BLD VENIPUNCTURE: CPT | Performed by: STUDENT IN AN ORGANIZED HEALTH CARE EDUCATION/TRAINING PROGRAM

## 2018-08-09 PROCEDURE — 97530 THERAPEUTIC ACTIVITIES: CPT | Mod: GP

## 2018-08-09 PROCEDURE — 94640 AIRWAY INHALATION TREATMENT: CPT

## 2018-08-09 PROCEDURE — 25000125 ZZHC RX 250

## 2018-08-09 PROCEDURE — P9037 PLATE PHERES LEUKOREDU IRRAD: HCPCS | Performed by: STUDENT IN AN ORGANIZED HEALTH CARE EDUCATION/TRAINING PROGRAM

## 2018-08-09 PROCEDURE — 00000146 ZZHCL STATISTIC GLUCOSE BY METER IP

## 2018-08-09 PROCEDURE — G0463 HOSPITAL OUTPT CLINIC VISIT: HCPCS

## 2018-08-09 PROCEDURE — 87086 URINE CULTURE/COLONY COUNT: CPT | Performed by: OBSTETRICS & GYNECOLOGY

## 2018-08-09 PROCEDURE — 25000132 ZZH RX MED GY IP 250 OP 250 PS 637: Performed by: OBSTETRICS & GYNECOLOGY

## 2018-08-09 PROCEDURE — 40000556 ZZH STATISTIC PERIPHERAL IV START W US GUIDANCE

## 2018-08-09 PROCEDURE — 97161 PT EVAL LOW COMPLEX 20 MIN: CPT | Mod: GP

## 2018-08-09 PROCEDURE — 80048 BASIC METABOLIC PNL TOTAL CA: CPT | Performed by: STUDENT IN AN ORGANIZED HEALTH CARE EDUCATION/TRAINING PROGRAM

## 2018-08-09 PROCEDURE — 25000125 ZZHC RX 250: Performed by: STUDENT IN AN ORGANIZED HEALTH CARE EDUCATION/TRAINING PROGRAM

## 2018-08-09 RX ORDER — IPRATROPIUM BROMIDE AND ALBUTEROL SULFATE 2.5; .5 MG/3ML; MG/3ML
3 SOLUTION RESPIRATORY (INHALATION) EVERY 4 HOURS PRN
Status: DISCONTINUED | OUTPATIENT
Start: 2018-08-09 | End: 2018-08-10 | Stop reason: HOSPADM

## 2018-08-09 RX ADMIN — SULFAMETHOXAZOLE AND TRIMETHOPRIM 1 TABLET: 800; 160 TABLET ORAL at 08:08

## 2018-08-09 RX ADMIN — SULFAMETHOXAZOLE AND TRIMETHOPRIM 1 TABLET: 800; 160 TABLET ORAL at 19:51

## 2018-08-09 RX ADMIN — IPRATROPIUM BROMIDE AND ALBUTEROL SULFATE 3 ML: .5; 3 SOLUTION RESPIRATORY (INHALATION) at 13:00

## 2018-08-09 RX ADMIN — IPRATROPIUM BROMIDE AND ALBUTEROL SULFATE 3 ML: .5; 3 SOLUTION RESPIRATORY (INHALATION) at 08:35

## 2018-08-09 RX ADMIN — METFORMIN HYDROCHLORIDE 1000 MG: 500 TABLET ORAL at 08:08

## 2018-08-09 RX ADMIN — INSULIN ASPART 5 UNITS: 100 INJECTION, SOLUTION INTRAVENOUS; SUBCUTANEOUS at 11:48

## 2018-08-09 RX ADMIN — LEVOTHYROXINE SODIUM 50 MCG: 50 TABLET ORAL at 08:07

## 2018-08-09 RX ADMIN — METFORMIN HYDROCHLORIDE 1000 MG: 500 TABLET ORAL at 19:55

## 2018-08-09 RX ADMIN — INSULIN DETEMIR 42 UNITS: 100 INJECTION, SOLUTION SUBCUTANEOUS at 19:51

## 2018-08-09 RX ADMIN — GABAPENTIN 1200 MG: 600 TABLET, FILM COATED ORAL at 14:43

## 2018-08-09 RX ADMIN — INSULIN DETEMIR 42 UNITS: 100 INJECTION, SOLUTION SUBCUTANEOUS at 08:14

## 2018-08-09 RX ADMIN — IPRATROPIUM BROMIDE AND ALBUTEROL SULFATE 3 ML: .5; 3 SOLUTION RESPIRATORY (INHALATION) at 16:47

## 2018-08-09 RX ADMIN — INSULIN ASPART 1 UNITS: 100 INJECTION, SOLUTION INTRAVENOUS; SUBCUTANEOUS at 08:22

## 2018-08-09 RX ADMIN — GABAPENTIN 1200 MG: 600 TABLET, FILM COATED ORAL at 19:51

## 2018-08-09 RX ADMIN — HUMAN INSULIN 5 UNITS/HR: 100 INJECTION, SOLUTION SUBCUTANEOUS at 03:24

## 2018-08-09 RX ADMIN — GABAPENTIN 1200 MG: 600 TABLET, FILM COATED ORAL at 08:08

## 2018-08-09 RX ADMIN — SENNOSIDES AND DOCUSATE SODIUM 2 TABLET: 8.6; 5 TABLET ORAL at 11:55

## 2018-08-09 ASSESSMENT — ACTIVITIES OF DAILY LIVING (ADL)
ADLS_ACUITY_SCORE: 19
ADLS_ACUITY_SCORE: 19
ADLS_ACUITY_SCORE: 21
ADLS_ACUITY_SCORE: 19

## 2018-08-09 ASSESSMENT — PAIN DESCRIPTION - DESCRIPTORS
DESCRIPTORS: ACHING

## 2018-08-09 NOTE — PROGRESS NOTES
Gynecology Oncology Progress Note  8/9/18    24hr events:  - BG up to 325 despite HSSI, insulin drip started  - CXR w/ pulmonary edema, 20mg IV lasix given, IVF decreased to 50cc/hr  - Febrile, 102.1F (2000)  - plt 29 > 2U plt txf    Subjective: Patient is feeling okay this morning. Denies any abdominal pain, reports chronic back pain is bothering her. Denies nausea or vomiting, hungry this morning. Not feeling SOB or having tightness or chest pain. Brice in place. Passing flatus, denies BM.    Objective:   Patient Vitals for the past 6 hrs:   BP Temp Temp src Heart Rate Resp SpO2   08/09/18 0600 100/54 98.6  F (37  C) Oral 73 15 96 %   08/09/18 0510 113/61 98.5  F (36.9  C) Oral 75 17 95 %   08/09/18 0500 109/59 - - 74 17 94 %   08/09/18 0445 108/58 98.6  F (37  C) Oral 74 18 94 %   08/09/18 0400 107/60 - - 76 18 -   08/09/18 0300 104/59 98.3  F (36.8  C) - 74 16 96 %   08/09/18 0200 106/58 - - 74 16 96 %   08/09/18 0100 110/57 - - 78 15 -   08/09/18 0030 105/51 - - 78 16 -   On 3-7L O2 via NC    I/O last 3 completed shifts:  In: 2757.57 [P.O.:20; I.V.:1337.57]  Out: 1405 [Urine:1205; Blood:200]  Since MN: IN 30mL IV    [urine]    General: lying in bed, in NAD  CV: RRR, no m/r/g  Resp: wheezing throughout, non labored breathing  Abdomen: soft, minimally tender in midline lower abdomen, not distended, obese  Incision: port sites c/d/i, bandages overlying (x6)  Extremities: R BKA, no edema or erythem. Left without edema or erythema    New labs/imaging-  Hepatic panel, Hep B/C pending   8/9/2018 04:32   Sodium 140   Potassium 3.9   Chloride 106   Carbon Dioxide 27   Urea Nitrogen 17   Creatinine 0.58   GFR Estimate >90   GFR Estimate If Black >90   Calcium 7.8 (L)   Anion Gap 7   Albumin 2.9 (L)   Protein Total 5.9 (L)   Bilirubin Total 0.7   Alkaline Phosphatase 56   ALT 36   AST 45   Bilirubin Direct 0.2      8/9/2018 04:32   WBC 3.5 (L)   Hemoglobin 9.4 (L)   Hematocrit 29.5 (L)   Platelet Count 33 (LL)    RBC Count 2.94 (L)      MCH 32.0   MCHC 31.9   RDW 14.8   INR 1.27 (H)      8/8/2018 22:12 8/8/2018 23:02 8/9/2018 00:04 8/9/2018 01:10 8/9/2018 02:04 8/9/2018 03:09 8/9/2018 04:07 8/9/2018 04:32 8/9/2018 05:08 8/9/2018 06:04   Glucose 270 (H) 241 (H) 239 (H) 255 (H) 216 (H) 204 (H) 187 (H) 168 (H) 166 (H) 152 (H)     CXR (08/08/18):   IMPRESSION:   1. Diffuse bilateral pulmonary opacities, likely due to pulmonary  edema and/or infection.  2. Left basilar opacities, likely atelectasis and/or consolidation.    Assessment: 62 yo POD#1 s/p DA TLH, BSO, Pelvic LN dissection, cysto - post op course c/b pulmonary edema, poorly controlled BG, persistent thrombocytopenia and an isolated fever-- Doing well this morning, though still requiring O2 and insulin drip.    Dz: Grade 2 endometrioid adenocarcinoma  FEN: regular diet, NS 50cc/hr - Will likely DC today  Pain: TAP block, tylenol, oxycodone, IV dilaudid  Heme: Hgb 11.5> > 10.6 (intraop)>9.8>9.5>9.4>9.4  Thrombocytopenia, plts starting at 35, s/p total of 7u plt, giving an additional unit currently. Txf for plt<30K. INR 1.21>1.29>1.27>1.30>1.27, PTT 30, fibrinogen 321.  CV: NI  Pulm: USHA, COPD, current smoker, CXR w/ b/l pulmonary edema. S/p 20mg IV lasix last night. Encourage IS use. Duonebs q4hr. Limit IVF  GI: ADAT, prn antiemetics and bowel regimen, cirrhosis on laparoscopy. GI/hepatology consult this AM. Hepatic panel and hepatitis B/C pending  : pierre replaced (unable to void), plan to remove POD#1. Patient reporting urgency and dysuria prior to procedure, will send UA  ID: S/p ancef. Ppx bactrim for h/o osteomyelitis. Tm 102.1F (2000), Tl 100.4 (2115). Will monitor. If febrile again will send BCx and UCx. Fever likely due to plt txf reaction vs atelectasis.  Endo: Type 2DM, A1C 6.2 (6/19). Currently on insulin drip, will start insulin Detemir this AM. (home regimen: Novolog 28U/30/28, Detemir 42 BID). Start home metformin 1000 BID this AM   -  Hypothyroid on levothyroxine 50 mcg daily  Psych/Neuro: Depression, peripheral neuropathy, chronic pain - gabapentin 1200 TID  MSK: R BKA, L toe amputee  PPX: SCDs, IS.   Dispo: pending post-op recovery, likely POD#2    Kristal Denise MD  Gyn Onc PGY4  Pager [phone]    I have examined this patient personally with the team and agree with the above findings and plan.  Patient is anxious to DC and appears ready for this at present.    Lewis Segal

## 2018-08-09 NOTE — PLAN OF CARE
Problem: Patient Care Overview  Goal: Plan of Care/Patient Progress Review  Discharge Planner PT   Patient plan for discharge: Home with  assist.  Current status: Pivots bed>commode>w/c without physical assist and with moderate pain. Able to work through it. Binder donned for improved support.   Barriers to return to prior living situation: None  Recommendations for discharge: Home with  assist; expect transfers to improve with pain  Rationale for recommendations: Current level of function       Entered by: Omid Lubin 08/09/2018 5:16 PM     Physical Therapy Discharge Summary    Reason for therapy discharge:    All goals and outcomes met, no further needs identified.    Progress towards therapy goal(s). See goals on Care Plan in [facility] electronic health record for goal details.  Goals met    Therapy recommendation(s):    No further therapy is recommended.

## 2018-08-09 NOTE — PROGRESS NOTES
08/09/18 1700   Quick Adds   Type of Visit Initial PT Evaluation   Living Environment   Lives With spouse   Living Arrangements house   Home Accessibility no concerns;grab bars present (bathtub);grab bars present (toilet)   Number of Stairs to Enter Home 0   Number of Stairs Within Home 0   Transportation Available car   Living Environment Comment Pt is w/c bound at baseline but still drives. House is fully handicap accessible.`   Self-Care   Usual Activity Tolerance fair   Current Activity Tolerance fair   Regular Exercise no   Equipment Currently Used at Home wheelchair, power;shower chair;commode   Activity/Exercise/Self-Care Comment Pt stand pivots indep at baseline.   Functional Level Prior   Ambulation 4-->completely dependent   Transferring 1-->assistive equipment   Toileting 1-->assistive equipment   Bathing 2-->assistive person   Dressing 2-->assistive person   Fall history within last six months no   Which of the above functional risks had a recent onset or change? transferring   Prior Functional Level Comment Indep with mobility prior to admission.   General Information   Onset of Illness/Injury or Date of Surgery - Date 08/08/18   Patient/Family Goals Statement Wants to get OOB and go home   Pertinent History of Current Problem (include personal factors and/or comorbidities that impact the POC) 64 yo POD#1 s/p DA TLH, BSO, Pelvic LN dissection, cysto - post op course c/b pulmonary edema, poorly controlled BG, persistent thrombocytopenia and an isolated fever-- Doing well this morning, though still requiring O2 and insulin drip.   Precautions/Limitations abdominal precautions   Cognitive Status Examination   Orientation orientation to person, place and time   Level of Consciousness alert   Follows Commands and Answers Questions 100% of the time   Personal Safety and Judgment intact   Memory intact   Pain Assessment   Patient Currently in Pain Yes, see Vital Sign flowsheet   Integumentary/Edema  "  Integumentary/Edema no deficits were identifed   Posture    Posture Forward head position;Protracted shoulders   Range of Motion (ROM)   ROM Comment Pt with R BKA. B UE/LE AROM WFL   Strength   Strength Comments Left LE strength at least 3+/5.    Bed Mobility   Bed Mobility Comments Not formally assessed as pt sleeps in w/c.   Transfer Skills   Transfer Comments Sit>stand with SBA for safety. Cues for abd precautions. Pt uses R BKA stump to bear weight through it at [facility].   Gait   Gait Comments Not assessed   Balance   Balance Comments SBA for standing dynamic balance. Indep sitting balance.   Sensory Examination   Sensory Perception no deficits were identified   General Therapy Interventions   Planned Therapy Interventions transfer training;home program guidelines   Clinical Impression   Criteria for Skilled Therapeutic Intervention yes, treatment indicated   PT Diagnosis Impaired functional mobility   Influenced by the following impairments Pain, mild deconditioning   Functional limitations due to impairments Transfers   Clinical Presentation Stable/Uncomplicated   Clinical Presentation Rationale Clinical judgement   Clinical Decision Making (Complexity) Low complexity   Therapy Frequency` (1x eval and treat)   Predicted Duration of Therapy Intervention (days/wks) 1x eval and treat   Anticipated Discharge Disposition Home with Assist   Risk & Benefits of therapy have been explained Yes   Patient, Family & other staff in agreement with plan of care Yes   [facility] AM-PAC  \"6 Clicks\" V.2 Basic Mobility Inpatient Short Form   1. Turning from your back to your side while in a flat bed without using bedrails? 4 - None   2. Moving from lying on your back to sitting on the side of a flat bed without using bedrails? 4 - None   3. Moving to and from a bed to a chair (including a wheelchair)? 4 - None   4. Standing up from a chair using your arms (e.g., wheelchair, or bedside chair)? 4 - None   5. To walk in " hospital room? 4 - None   6. Climbing 3-5 steps with a railing? 4 - None   Basic Mobility Raw Score (Score out of 24.Lower scores equate to lower levels of function) 24   Total Evaluation Time   Total Evaluation Time (Minutes) 10

## 2018-08-09 NOTE — OR NURSING
Patient started on Insulin gtt per orders. Patient alert and talkative. Dressings marked. AVSS. Requiring supplemental O2 to maintain saturations >92%. Patient transferred to 6B. Bedside RN present upon arrival. Patient on inpatient bed. Left foot elevated. Dressing CDI. Patient right hand PIV noted to be dislodged while preparing patient for transport. Two bags of patient belongings and chart brought to Room # 28 on 6B.

## 2018-08-09 NOTE — CONSULTS
GASTROENTEROLOGY CONSULTATION      Date of Admission:  8/8/2018      We were asked by Dr. Denise to evaluate this patient with thrombocytopenia and concern for liver disease.       Patient Summary     Fani Escobedo is a 63 year old female with a past medical history history significant for COPD, DMII, hypothyroidism, OA, major depression, USHA, and chronic pain syndrome who was admitted  To the hospital on 8/8 for total laparoscopic hysterectomy, bilateral salpingo-oopherectomy and bilateral LN dissection for endometrial carcinoma. GI were consulted for thrombocytopenia with concern for liver disease as a cause.           ASSESSMENT AND RECOMMENDATIONS:     Likely liver cirrhosis, likely secondary to SIN: History of thrombocytopenia as detailed in the HPI. Heme\Onc believe this is from TMP\Sx with a contribution from immune thrombocytopnia and possible liver disease. Based on the patient's imaging, including CT abdomen pelvis showing an Atrophic and nodular contour of the liver with splenomegaly, thrombocytopenia, and elevated INRs suggesting poor synthetic function suggest cirrhosis. Cause is likely SIN in the setting of her long-term obesity and likely metabolic syndrome suggested by DMII and her body habitus. Her viral hepatitis serologies are negative and she has no significant hx of alcohol abuse. Will rule out other causes of hepatitis, going forward, she will need follow up at the hepatology clinic for screening for cirrhosis related complications and vaccination.     Recommendations:  - Please obtain ASMA, DORITA.   - Anti-mitochondrial Abs, and alpha-1-antitrypsin levels.   - She will need outpatient follow up with the GI clinic post discharge.   - Hepatology will continue to follow, please contact with any questions.          Thank you for involving us in this patient's care. Please do not hesitate to contact the GI service with any questions or concerns.     Pt care plan discussed with BONIFACIO Recinos, GI  staff physician.    Natalio Jenkins MD  Internal medicine, PGY-3  P: [phone]    The patient was seen and examined.  The above assessment and plan was developed jointly with the resident angelica the fellow.      Ousmane Arroyo MD  -------------------------------------------------------------------------------------------------------------------    History of presenting illness (HPI):    Fani Escobedo is a 63 year old female with a past medical history history significant for COPD, DMII, hypothyroidism, OA, major depression, USHA, and chronic pain syndrome who was admitted  To the hospital on 8/8 for total laparoscopic hysterectomy, bilateral salpingo-oopherectomy and bilateral LN dissection for endometrial carcinoma. GI were consulted for thrombocytopenia with concern for liver disease as a cause.    The patient had Intermittent thrombocytopenia since 2005 then persistent thrombocytopenia since 2012. The patient saw heme\Onc and underwent BM bx on 6/2018 that showed no evidence of myelodysplasia or malignancy, thrombocytopenia was though to be to chronic TMP\Sx use in addition to possible immune thrombocytopenia.   The patient notes she has does not know of any history of liver disease, she reports she does not drink alcohol regularly, she drinks around 1 drink every few weeks to few months, she has a distant history of heavier use but still at the time (25 year ago) she used to drink around 5 drinks a day 2 days a week and no more than  12 drinks a week. She however notes that she has a prolonged hx of obesity and has been obese since she was 25 after she had her first twins. She has struggled with weight loss.   The patient denies any previous episodes of yellow discoloration, lower extremity swelling, abdominal distention, she is a non-smoker, ROS is otherwise -ve.          Previous Endoscopy:   No results found for this or any previous visit.  None.          Previous Colonoscopy:    None.         ________________________________________________________________________________________________________________________________________________________________________________________    Past Medical History    Reviewed and edited as appropriate  Past Medical History:   Diagnosis Date     Anemia     iron deficiency     Charcot foot due to diabetes mellitus (H)     (R) foot -> BKA     COPD (chronic obstructive pulmonary disease) (H)      Depression      Diabetes mellitus (H)     type II     Hypothyroid      Obesity     bmi 46     USHA (obstructive sleep apnea)     does not use CPAP     Osteomyelitis (H)      Peripheral neuropathy      Tobacco abuse        Past Surgical History   Reviewed and edited as appropriate   Past Surgical History:   Procedure Laterality Date     AMPUTATE LEG BELOW KNEE  4/14/2012    Procedure:AMPUTATE LEG BELOW KNEE; right leg below knee amputation; Surgeon:WILMAR LUI; Location: OR     AMPUTATE TOE(S)  5/1/2013    Procedure: AMPUTATE TOE(S);  PARTIAL LEFT 3RD TOE AMPUTATION;  Surgeon: Juancarlos Gamez DPM;  Location:  OR     AMPUTATION      (L) 2nd toe     ANESTHESIA OUT OF OR MRI  6/11/2013    Procedure: ANESTHESIA OUT OF OR MRI;  MRI UNDER GENERAL ANESTHESIA;  Surgeon: Provider, Generic Anesthesia;  Location:  OR     ANESTHESIA OUT OF OR MRI  9/4/2013    Procedure: ANESTHESIA OUT OF OR MRI;  ANESTHESIA OUT OF OR MRI;  Surgeon: Provider, Generic Anesthesia;  Location:  OR     BONE MARROW BIOPSY, BONE SPECIMEN, NEEDLE/TROCAR N/A 6/7/2018    Procedure: BIOPSY BONE MARROW;  bone marrow biopsy;  Surgeon: Farzana Teixeira MD;  Location:  GI     CYSTOSCOPY N/A 8/8/2018    Procedure: CYSTOSCOPY;;  Surgeon: Trudy Cortez MD;  Location: [facility] OR     DAVINCI HYSTERECTOMY TOTAL, BILATERAL SALPINGO-OOPHORECTOMY, COMBINED N/A 8/8/2018    Procedure: COMBINED DAVINCI HYSTERECTOMY TOTAL, SALPINGO-OOPHORECTOMY;  DaVinci Assisted Total Laparoscopic Hysterectomy, Bilateral  Salpingo-Oophorectomy, Cystoscopy, Bilateral Pelvic Lymph Node Dissection;  Surgeon: Trudy Cortez MD;  Location: [facility] OR     DILATION AND CURETTAGE N/A 6/6/2018    Procedure: DILATION AND CURETTAGE;  DILATION AND CURETTAGE ;  Surgeon: Tip Ortega MD;  Location:  OR     EXPLORE SPINE, REMOVE HARDWARE, COMBINED  10/11/2013    Procedure: COMBINED EXPLORE SPINE, REMOVE HARDWARE;  EXPLORATION AND REVISION POSTERIOR THORACIC WOUND WITH WOUND VAC PLACEMENT.;  Surgeon: Thomas Ellis MD;  Location: [facility] OR     FUSION SPINE ANTERIOR THORACIC ONE LEVEL  9/5/2013    Procedure: FUSION SPINE ANTERIOR THORACIC ONE LEVEL;  Right Thoracotomy For  T7-T8 Thorasic Vertebral Body Resection with Strut Graft, Velpen Instrumentation T6-T9, BMP, Pyramesh and Intra-operative Neuro Monitoring ;  Surgeon: Thomas Ellis MD;  Location:  OR     HYSTERECTOMY RADICAL, SALPINGO-OOPHORECTOMY, NODE DISSECTION  08/08/2018    DaVinci assisted TLH, BSO, BPLND; Surgeon: Trudy Cortez MD     INCISION AND DRAINAGE RECTUM, COMBINED  6/11/2012    Procedure: COMBINED INCISION AND DRAINAGE RECTUM;  I&D ABSCESS RIGHT BUTTOCK (MRSA );  Surgeon: Christos Linton MD;  Location:  OR     INCISION AND DRAINAGE SPINE, CLOSE WOUND, COMBINED  10/14/2013    Procedure: COMBINED INCISION AND DRAINAGE SPINE, CLOSE WOUND;  EXPLORATION/REVISION POSTERIOR THORACIC WOUND COMPLEX 15 CM. ;  Surgeon: Thomas Ellis MD;  Location:  OR     IRRIGATION AND DEBRIDEMENT TRUNK, COMBINED  6/10/2012    Procedure: COMBINED IRRIGATION AND DEBRIDEMENT TRUNK;  Incision and drainage abscess right buttock;  Surgeon: Christos Linton MD;  Location:  OR     OPTICAL TRACKING SYSTEM FUSION POSTERIOR SPINE THORACIC THREE+ LEVELS  9/6/2013    Procedure: OPTICAL TRACKING SYSTEM FUSION POSTERIOR SPINE THORACIC THREE+ LEVELS;  T4-T11 POSTERIOR LATERAL PEDICLE SCREW INSTRUMENTATION WITH IMAGE GUIDANCE AND NEURO-MONITORING;  Surgeon: Caleb  "Thomas Laurent MD;  Location:  OR       Social History   Reviewed and edited as appropriate  Social History     Social History     Marital status:      Spouse name: N/A     Number of children: N/A     Years of education: N/A     Occupational History     Not on file.     Social History Main Topics     Smoking status: Current Some Day Smoker     Packs/day: 1.00     Years: 45.00     Smokeless tobacco: Never Used     Alcohol use Yes      Comment: occ     Drug use: No     Sexual activity: Not on file     Other Topics Concern     Not on file     Social History Narrative       Family History     Reviewed and edited as appropriate  History reviewed. No pertinent family history.         Allergies   Reviewed and edited as appropriate     Allergies   Allergen Reactions     Adhesive Tape      Cephalosporins Anaphylaxis     Naproxen Anaphylaxis     Penicillins      Augmentin Rash     Benadryl [Anti-Itch] Rash     Morphine Hcl Itching and Rash        Prior to Admission Medications    Current Facility-Administered Medications   Medication     acetaminophen (TYLENOL) tablet 650 mg     benzocaine-menthol (CEPACOL) 15-3.6 MG lozenge 1-2 lozenge     bisacodyl (DULCOLAX) Suppository 10 mg     bupivacaine liposome (EXPAREL) LONG ACTING injection was administered into the infiltration site to produce postsurgical analgesia. Duration of action is up to 72 hours, and other \"jorge a\" medications should not be given for 96 hours with the exception of the lidocaine 5% patch (LIDODERM) and the lidocaine 10mg in potassium infusions. This entry is for INFORMATION ONLY.     dextrose 10 % 1,000 mL infusion     glucose gel 15-30 g    Or     dextrose 50 % injection 25-50 mL    Or     glucagon injection 1 mg     gabapentin (NEURONTIN) capsule 1,200 mg     HYDROmorphone (DILAUDID) injection 0.2 mg     hydrOXYzine (ATARAX) tablet 25 mg     insulin aspart (NovoLOG) inj (RAPID ACTING)     insulin aspart (NovoLOG) inj (RAPID ACTING)     insulin " "detemir (LEVEMIR) injection 42 Units     ipratropium - albuterol 0.5 mg/2.5 mg/3 mL (DUONEB) neb solution 3 mL     levothyroxine (SYNTHROID/LEVOTHROID) tablet 50 mcg     lidocaine (LMX4) cream     lidocaine 1 % 1 mL     magnesium hydroxide (MILK OF MAGNESIA) suspension 15 mL     metFORMIN (GLUCOPHAGE) tablet 1,000 mg     naloxone (NARCAN) injection 0.1-0.4 mg     naloxone (NARCAN) injection 0.1-0.4 mg     ondansetron (ZOFRAN-ODT) ODT tab 4 mg    Followed by     ondansetron (ZOFRAN-ODT) ODT tab 4 mg     oxyCODONE IR (ROXICODONE) tablet 5-10 mg     prochlorperazine (COMPAZINE) injection 5-10 mg     senna-docusate (SENOKOT-S;PERICOLACE) 8.6-50 MG per tablet 1 tablet    Or     senna-docusate (SENOKOT-S;PERICOLACE) 8.6-50 MG per tablet 2 tablet     simethicone (MYLICON) chewable tablet 80 mg     sodium chloride (PF) 0.9% PF flush 3 mL     sodium chloride (PF) 0.9% PF flush 3 mL     sulfamethoxazole-trimethoprim (BACTRIM DS/SEPTRA DS) 800-160 MG per tablet 1 tablet      Current Facility-Administered Medications   Medication     acetaminophen (TYLENOL) tablet 650 mg     benzocaine-menthol (CEPACOL) 15-3.6 MG lozenge 1-2 lozenge     bisacodyl (DULCOLAX) Suppository 10 mg     bupivacaine liposome (EXPAREL) LONG ACTING injection was administered into the infiltration site to produce postsurgical analgesia. Duration of action is up to 72 hours, and other \"jorge a\" medications should not be given for 96 hours with the exception of the lidocaine 5% patch (LIDODERM) and the lidocaine 10mg in potassium infusions. This entry is for INFORMATION ONLY.     dextrose 10 % 1,000 mL infusion     glucose gel 15-30 g    Or     dextrose 50 % injection 25-50 mL    Or     glucagon injection 1 mg     gabapentin (NEURONTIN) capsule 1,200 mg     HYDROmorphone (DILAUDID) injection 0.2 mg     hydrOXYzine (ATARAX) tablet 25 mg     insulin aspart (NovoLOG) inj (RAPID ACTING)     insulin aspart (NovoLOG) inj (RAPID ACTING)     insulin detemir (LEVEMIR) " "injection 42 Units     ipratropium - albuterol 0.5 mg/2.5 mg/3 mL (DUONEB) neb solution 3 mL     levothyroxine (SYNTHROID/LEVOTHROID) tablet 50 mcg     lidocaine (LMX4) cream     lidocaine 1 % 1 mL     magnesium hydroxide (MILK OF MAGNESIA) suspension 15 mL     metFORMIN (GLUCOPHAGE) tablet 1,000 mg     naloxone (NARCAN) injection 0.1-0.4 mg     naloxone (NARCAN) injection 0.1-0.4 mg     ondansetron (ZOFRAN-ODT) ODT tab 4 mg    Followed by     ondansetron (ZOFRAN-ODT) ODT tab 4 mg     oxyCODONE IR (ROXICODONE) tablet 5-10 mg     prochlorperazine (COMPAZINE) injection 5-10 mg     senna-docusate (SENOKOT-S;PERICOLACE) 8.6-50 MG per tablet 1 tablet    Or     senna-docusate (SENOKOT-S;PERICOLACE) 8.6-50 MG per tablet 2 tablet     simethicone (MYLICON) chewable tablet 80 mg     sodium chloride (PF) 0.9% PF flush 3 mL     sodium chloride (PF) 0.9% PF flush 3 mL     sulfamethoxazole-trimethoprim (BACTRIM DS/SEPTRA DS) 800-160 MG per tablet 1 tablet        Review of Systems   A complete review of systems was performed and is negative except as noted in the HPI      Physical Exam   /65 (BP Location: Left arm)  Pulse 97  Temp 98.7  F (37.1  C) (Oral)  Resp 23  Ht 1.765 m (5' 9.5\")  Wt 122.3 kg (269 lb 10 oz)  SpO2 98%  BMI 39.25 kg/m2  Wt:   Wt Readings from Last 2 Encounters:   08/08/18 122.3 kg (269 lb 10 oz)   07/19/18 118.9 kg (262 lb 3.2 oz)      Constitutional: Awake and alert, no asterixis.   Eyes: Sclera anicteric.   Ears/nose/mouth/throat: Normal oropharynx without ulcers or exudate, mucus membranes moist, No Fetor hepaticus.   Neck: No JVD.   Cardiovascular: RRR, Normal S1, S2, no added sounds or murmurs. No rubs or gallops.   Respiratory: Good air entry bilaterally, no wheezing or crackles.   Abdomen:  Nondistended, no tenderness to palpation. Bowel sounds are normoactive. Tympanic to percussion. No Ascitic fluid thrill.     Skin: warm, perfused, no jaundice  Psych: Normal affect  MSK: Left BKA.   Data "   Labs and imaging below were independently reviewed and interpreted    BMP  Recent Labs  Lab 08/09/18 0432 08/08/18  0751     --    POTASSIUM 3.9 3.9   CHLORIDE 106  --    DESIREE 7.8*  --    CO2 27  --    BUN 17  --    CR 0.58 0.49*   *  --      CBC  Recent Labs  Lab 08/09/18 0432 08/09/18 0059 08/08/18 2052 08/08/18  1725   WBC 3.5* 3.2* 3.6* 3.9*   RBC 2.94* 2.93* 2.94* 3.02*   HGB 9.4* 9.4* 9.3* 9.5*   HCT 29.5* 29.4* 29.6* 30.1*    100 101* 100   MCH 32.0 32.1 31.6 31.5   MCHC 31.9 32.0 31.4* 31.6   RDW 14.8 14.7 14.6 14.6   PLT 33* 29* 31* 34*     INR  Recent Labs  Lab 08/09/18 0432 08/09/18 0059 08/08/18 2052 08/08/18  1725   INR 1.27* 1.30* 1.27* 1.29*     LFTs  Recent Labs  Lab 08/09/18 0432   ALKPHOS 56   AST 45   ALT 36   BILITOTAL 0.7   PROTTOTAL 5.9*   ALBUMIN 2.9*      PANCNo lab results found in last 7 days.

## 2018-08-09 NOTE — OR NURSING
Gyn/Onc MD at bedside. Wet cough. Insulin gtt ordered. O2 saturations decreased from baseline. PO pain medication as ordered. Dr. Prescott also at bedside. No immediate changes to care plan.

## 2018-08-09 NOTE — PLAN OF CARE
Problem: Patient Care Overview  Goal: Plan of Care/Patient Progress Review  Outcome: No Change  Neuro: A&Ox4, lethargic at times.   Cardiac: SR HR 70s-90s. VSS. SBP 100s-110s.   Respiratory: Sating 90%+ on 2-4L NC. No SOB. Clear/diminished lungs.   GI/: Adequate urine output, orange UOP from pierre. No N/V, refused scheduled zofran. Hypoactive bowel meds. 6x abd lap sites, drainage scant/unchaged to prim[place].   Diet/appetite: Tolerating liquids, advanced to cons. Carb this AM.   Activity:  Assist of 2 to turn/boost, R BKA.   Pain: Complains of back pain with movement, denied any pain medication.   Skin: No new deficits noted. L heel dressing changed to assess, re-dressed with vaseline guaze, abd/kerlix. Mepilex to coccyx.  LDA's: Bilateral PIVs L insulin gtt at 2.3-7u/hr overnight. Glucoses ranged from 146-270. 1 unit of platelets given.     Plan: Continue with POC. Notify primary team with changes.

## 2018-08-09 NOTE — PLAN OF CARE
Problem: Patient Care Overview  Goal: Plan of Care/Patient Progress Review  Outcome: No Change  Neuro: A&Ox4.   Cardiac: SR. VSS.   Respiratory: Sating 95%+ on 3LNC.  GI/: 140 urine out this shift. Brice removed awaiting post removal void. No BM this shift.  Diet/appetite: Tolerating Mod. Carb diet. Eating well.  Activity:  Bed fast. PT ordered. Electronic wheelchair in car.  Pain: At acceptable level on current regimen.   Skin: No new deficits noted.   LDA's: PIV SL. 1 unit of platelets transfused.    Plan: Continue with POC. Notify primary team with changes.

## 2018-08-09 NOTE — PROGRESS NOTES
Gynecology Oncology Postoperative Check Note    Subjective: Patient is feeling okay, pain in pelvis/lower abdomen.   Not feeling SOB or having tightness or chest pain. Denies nausea or vomiting.     Objective:   Patient Vitals for the past 6 hrs:   BP Temp Temp src Heart Rate Resp SpO2   08/08/18 1930 109/54 100.2  F (37.9  C) Axillary - 16 92 %   08/08/18 1900 127/76 - - - 16 94 %   08/08/18 1845 133/74 - - - 16 90 %   08/08/18 1830 138/87 - - 105 - 91 %   08/08/18 1815 - - - 87 16 95 %   08/08/18 1800 129/70 - - 79 16 95 %   08/08/18 1745 - - - 80 16 95 %   08/08/18 1730 117/44 97.7  F (36.5  C) Axillary 80 16 94 %   08/08/18 1715 - - - 82 16 95 %   08/08/18 1700 125/69 98.2  F (36.8  C) Oral 82 16 96 %   08/08/18 1645 - - - 85 - 94 %   08/08/18 1630 125/64 - - 81 16 95 %   08/08/18 1615 - - - 80 16 95 %   08/08/18 1600 123/64 - - 81 15 96 %   08/08/18 1545 - 96.8  F (36  C) Axillary 77 16 97 %   08/08/18 1530 120/68 96  F (35.6  C) Axillary 74 16 96 %   08/08/18 1515 - - - 78 16 91 %   08/08/18 1500 125/69 - - 73 15 97 %   08/08/18 1445 129/73 - - 73 15 96 %   08/08/18 1430 140/56 95.7  F (35.4  C) Axillary 78 15 95 %   08/08/18 1415 133/79 95.7  F (35.4  C) Axillary 74 18 95 %   On 6L O2 via NC    I/O last 3 completed shifts:  In: 2400 [I.V.:1200]  Out: 200 [Blood:200] urine: 1025  UOP: 133 cc/hr    General: lying in bed, in NAD  CV: RRR, no m/r/g  Resp: wheezing throughout, fine crackles & b/l bases  Abdomen: soft, minimally tender in midline lower abdomen, not distended, obese  Incision: port sites c/d/i, bandages overlying (x5)  Extremities: R BKA, no edema or erythem. Left without edema or erythema    New labs/imaging-  CBC, coags drawn & pending    -325 - last value 325 despite 5units HSSI in PACU    CXR (08/08/18)  Preliminary:   1. Diffuse bilateral pulmonary opacities, likely due to pulmonary  edema and/or infection.  2. Left basilar opacities, likely atelectasis and/or  consolidation.    Assessment: 62 yo POD#0 s/p DA TLH, BSO, Pelvic LN dissection, cysto - pulmonary edema on CXR w/ underlying baseline COPD & USHA, poorly controlled BG, and persistent thrombocytopenia that is at baseline, and now isolated fever in setting of plt transfusions intra- and post-op.    Dz: Grade 2 endometrioid adenocarcinoma  FEN: regular diet, NS decreased to 50cc/hr given pulm edema. Plan to stop when taking in PO  Pain: TAP block, tylenol, oxycodone, IV dilaudid  Heme: Hgb 11.5> > 10.6 (intraop)>9.8>9.5. Repeat pending. Low c/f active bleeding at this time  Thrombocytopenia plts 35>39>29>4Uplt>44.>2Uplt>34. INR 1.21>1.29, PTT 30, fibrinogen 321. Repeat CBC, coags pending. Txf for plt<30K  CV: NI  Pulm: USHA, COPD, current smoker, CXR w/ b/l pulmonary edema. 20mg IV lasix now. Encourage IS use. Duonebs q4hr. Limit IVF  GI: ADAT, prn antiemetics and bowel regimen, cirrhosis on laparoscopy  : pierre replaced (unable to void), plan to remove POD#1  ID: S/p ancef. Ppx bactrim for h/o osteomyelitis. Tm/Tl 102.1F (2000), afebrile since (99.8F @ 2053). Will monitor. If febrile again will send BCx and UCx. Fever likely due to plt txf reaction vs atelectasis as isolated.  Endo: Type 2DM, A1C 6.2 (6/19). Start insulin drip now. (home regimen not ordered currently: Novolog 28U/30/28, Detemir 42 BID) metformin 1000 BID.   -Hypothyroid on levothyroxine 50 mcg daily  Psych/Neuro: Depression, peripheral neuropathy, chronic pain - gabapentin 1200 TID  MSK: R BKA, L toe amputee  PPX: SCDs, IS  Dispo: pending post-op recovery, POD#1-2    Larisa Cardoso MD  Gyn Onc PGY3  Pager [phone]

## 2018-08-09 NOTE — PROGRESS NOTES
"Writer was assisting assigned RN with admission questions and during evaluation of valuables writer noted $86 in cash in wallet that is accounted for in flowsheet.  Pt requested writer's help in locating her rings in her bags of belongings and upon helping pt look through her purse, writer noted cash in a side pocket in excess of $100.  Pt declined to send to security and stated she \"knew the money was there and how much is there\".  Pt refused to count and wanted to keep it in the room with her.  Charge RN was notified and made aware.  "

## 2018-08-09 NOTE — OR NURSING
1830 Lab results called to service, platelets low despite 2 units platelets given. Repeat labs ordered for later in evening.  1845  patient c/o much back pain, turned to right side, o2 sats decreased to 88-90's, FiO2 increased, anesthesia notified, duoneb ordered.  Currently O2 sats 92-94 on 5L O2.  Patient encouraged to deep breathe, tried to use incentive spirometer, but unable to take very deep breaths. Surgery notified, CXR ordered. Regular bed ordered for patient comfort  1945 Handoff report given to Annel Isidro, RN

## 2018-08-09 NOTE — PROGRESS NOTES
Admission          8/8/2018  6:02 AM  -----------------------------------------------------------  Reason for admission: gyn/onc surgery.  Primary team notified of pt arrival.  Admitted from: PACU  Via: bed  Belongings: Placed in closet; valuables sent home with family  Teaching: orientation to unit and call light- call light within reach, call don't fall, use of console, meal times, when to call for the RN, and enforced importance of safety   Access: PIV  Telemetry:Placed on pt  Ht./Wt.: complete  2 RN Skin Assessment Completed By: Myself and Valerie Fernandez, 6b.    Temp:  [95.6  F (35.3  C)-102.1  F (38.9  C)] 98.6  F (37  C)  Pulse:  [97] 97  Heart Rate:  [] 73  Resp:  [15-24] 15  BP: (100-140)/(44-87) 100/54  MAP:  [59 mmHg-91 mmHg] 80 mmHg  Arterial Line BP: ()/(38-68) 142/55  SpO2:  [90 %-97 %] 96 %

## 2018-08-09 NOTE — PROGRESS NOTES
[facility] Nurse Inpatient Wound Assessment   Reason for consultation: Evaluate and treat Left plantar heel and Sacral area wound     Assessment  Left plantar heel wound due to Diabetic Ulcer   Sacral area - Healing MASD  Status: initial assessment and healing    Treatment Plan  Left plantar heel wound: Every other day   Cleanse wound bed with Microklenz and pat dry.  Cut a piece of Aquacel (#[id number]) to fit into the wound bed and place it. Ensure to cover entire wound bed.  Cover with optifoam (#[id number]) and ABD pad.  Then secure with kerlix.  Change dressing every other day.      Sacral area daily and PRN-    Cleanse the area very gently with soft cloth.    Apply critic aid paste     With repeat application, do not scrub the paste, only remove soiled paste and reapply.    If complete removal of paste is necessary use baby oil/mineral oil and soft wash cloth.    Orders Written  Recommended provider order: continue to follow with podiatry  [facility] Nurse follow-up plan:weekly  Nursing to notify the Provider(s) and re-consult the WO Nurse if wound(s) deteriorates or new skin concern.    Patient History  According to provider note(s):  64 yo POD#1 s/p DA TLH, BSO, Pelvic LN dissection, cysto - post op course c/b pulmonary edema, poorly controlled BG, persistent thrombocytopenia and an isolated fever--     Objective Data  Containment of urine/stool: Diaper and Incontinence Protocol    Active Diet Order    Active Diet Order      Moderate Consistent CHO Diet    Output:   I/O last 3 completed shifts:  In: 2788.28 [P.O.:20; I.V.:1368.28]  Out: 1930 [Urine:1730; Blood:200]    Risk Assessment:   Sensory Perception: 3-->slightly limited  Moisture: 3-->occasionally moist  Activity: 2-->chairfast  Mobility: 2-->very limited  Nutrition: 3-->adequate  Friction and Shear: 2-->potential problem  Cali Score: 15                          Labs:   Recent Labs  Lab 08/09/18  0432  08/08/18 2052   ALBUMIN 2.9*  --   --    HGB 9.4*  < > 9.3*   INR  1.27*  < > 1.27*   WBC 3.5*  < > 3.6*   A1C  --   --  6.8*   < > = values in this interval not displayed.    Physical Exam  Skin inspection: focused L) foot and sacrum/coccyx    Wound Location:  L) foot      8/9/18  Date of last photo 8/9/18  Wound History: Chronic diabetic foot ulcer. Has been following with podiatry at [facility]. Her  has been changing the dressing  Measurements (length x width x depth, in cm) 4.2 cm x 4.8 cm  x  0.3 cm   Wound Base:100% granular somewhat hypergranular tissue  Tunneling N/A  Undermining N/A  Palpation of the wound bed: normal   Periwound skin: minimal thin area of callus  Color: pale  Temperature: normal   Drainage:, moderate  Description of drainage: serosanguinous  Odor: none  Pain: denies ,     Sacral area- Area of 2 x 1x0.0cm healing moisture associated skin damage. Thin scabby area. Pt able to turn and reposition with encouragement.    Interventions  Current support surface: Standard  Atmos Air mattress  Current off-loading measures: Pillows under calves  Visual inspection of wound(s) completed  Wound Care: Area cleansed and applied barrier cream on sacral area  Supplies: ordered: Aquacel ag and optifoam  Education provided: importance of repositioning, plan of care and wound progress  Discussed plan of care with Patient, Family and Nurse    Yancy Cardoso RN

## 2018-08-09 NOTE — PLAN OF CARE
Problem: Patient Care Overview  Goal: Plan of Care/Patient Progress Review  PT 6B: CANCEL due to thrombocytopenia and getting platelets. Will reschedule.

## 2018-08-10 ENCOUNTER — TELEPHONE (OUTPATIENT)
Dept: GASTROENTEROLOGY | Facility: CLINIC | Age: 63
End: 2018-08-10

## 2018-08-10 VITALS
HEIGHT: 70 IN | WEIGHT: 275.57 LBS | SYSTOLIC BLOOD PRESSURE: 134 MMHG | HEART RATE: 97 BPM | TEMPERATURE: 98.5 F | DIASTOLIC BLOOD PRESSURE: 70 MMHG | OXYGEN SATURATION: 97 % | BODY MASS INDEX: 39.45 KG/M2 | RESPIRATION RATE: 20 BRPM

## 2018-08-10 LAB
ANA PAT SER IF-IMP: ABNORMAL
ANA SER QL IF: ABNORMAL
ANA TITR SER IF: ABNORMAL {TITER}
ANION GAP SERPL CALCULATED.3IONS-SCNC: 6 MMOL/L (ref 3–14)
BUN SERPL-MCNC: 18 MG/DL (ref 7–30)
CALCIUM SERPL-MCNC: 8.4 MG/DL (ref 8.5–10.1)
CHLORIDE SERPL-SCNC: 106 MMOL/L (ref 94–109)
CO2 SERPL-SCNC: 26 MMOL/L (ref 20–32)
CREAT SERPL-MCNC: 0.54 MG/DL (ref 0.52–1.04)
ERYTHROCYTE [DISTWIDTH] IN BLOOD BY AUTOMATED COUNT: 15.1 % (ref 10–15)
GFR SERPL CREATININE-BSD FRML MDRD: >90 ML/MIN/1.7M2
GLUCOSE BLDC GLUCOMTR-MCNC: 161 MG/DL (ref 70–99)
GLUCOSE BLDC GLUCOMTR-MCNC: 247 MG/DL (ref 70–99)
GLUCOSE SERPL-MCNC: 256 MG/DL (ref 70–99)
HCT VFR BLD AUTO: 30.5 % (ref 35–47)
HGB BLD-MCNC: 9.5 G/DL (ref 11.7–15.7)
MCH RBC QN AUTO: 31.8 PG (ref 26.5–33)
MCHC RBC AUTO-ENTMCNC: 31.1 G/DL (ref 31.5–36.5)
MCV RBC AUTO: 102 FL (ref 78–100)
PLATELET # BLD AUTO: 34 10E9/L (ref 150–450)
POTASSIUM SERPL-SCNC: 3.7 MMOL/L (ref 3.4–5.3)
RBC # BLD AUTO: 2.99 10E12/L (ref 3.8–5.2)
SODIUM SERPL-SCNC: 139 MMOL/L (ref 133–144)
WBC # BLD AUTO: 3.2 10E9/L (ref 4–11)

## 2018-08-10 PROCEDURE — 25000132 ZZH RX MED GY IP 250 OP 250 PS 637: Performed by: OBSTETRICS & GYNECOLOGY

## 2018-08-10 PROCEDURE — 82104 ALPHA-1-ANTITRYPSIN PHENO: CPT | Performed by: OBSTETRICS & GYNECOLOGY

## 2018-08-10 PROCEDURE — 00000146 ZZHCL STATISTIC GLUCOSE BY METER IP

## 2018-08-10 PROCEDURE — 83516 IMMUNOASSAY NONANTIBODY: CPT | Performed by: OBSTETRICS & GYNECOLOGY

## 2018-08-10 PROCEDURE — 82103 ALPHA-1-ANTITRYPSIN TOTAL: CPT | Performed by: OBSTETRICS & GYNECOLOGY

## 2018-08-10 PROCEDURE — 25000125 ZZHC RX 250: Performed by: OBSTETRICS & GYNECOLOGY

## 2018-08-10 PROCEDURE — 86038 ANTINUCLEAR ANTIBODIES: CPT | Performed by: OBSTETRICS & GYNECOLOGY

## 2018-08-10 PROCEDURE — 36415 COLL VENOUS BLD VENIPUNCTURE: CPT | Performed by: OBSTETRICS & GYNECOLOGY

## 2018-08-10 PROCEDURE — 86039 ANTINUCLEAR ANTIBODIES (ANA): CPT | Performed by: OBSTETRICS & GYNECOLOGY

## 2018-08-10 PROCEDURE — 80048 BASIC METABOLIC PNL TOTAL CA: CPT | Performed by: OBSTETRICS & GYNECOLOGY

## 2018-08-10 PROCEDURE — 85027 COMPLETE CBC AUTOMATED: CPT | Performed by: OBSTETRICS & GYNECOLOGY

## 2018-08-10 RX ORDER — HYDROMORPHONE HYDROCHLORIDE 2 MG/1
2-4 TABLET ORAL EVERY 4 HOURS PRN
Status: DISCONTINUED | OUTPATIENT
Start: 2018-08-10 | End: 2018-08-10 | Stop reason: HOSPADM

## 2018-08-10 RX ORDER — AMOXICILLIN 250 MG
1 CAPSULE ORAL 2 TIMES DAILY PRN
Qty: 100 TABLET | Refills: 0 | Status: SHIPPED | OUTPATIENT
Start: 2018-08-10 | End: 2018-08-10

## 2018-08-10 RX ORDER — FERROUS SULFATE 325(65) MG
325 TABLET ORAL DAILY
Qty: 30 TABLET | Refills: 0 | Status: SHIPPED | OUTPATIENT
Start: 2018-08-10

## 2018-08-10 RX ORDER — ACETAMINOPHEN 325 MG/1
650 TABLET ORAL EVERY 6 HOURS
Qty: 30 TABLET | Refills: 0 | Status: SHIPPED | OUTPATIENT
Start: 2018-08-10 | End: 2018-08-10

## 2018-08-10 RX ORDER — AMOXICILLIN 250 MG
2 CAPSULE ORAL 2 TIMES DAILY PRN
Qty: 100 TABLET | Refills: 0 | Status: SHIPPED | OUTPATIENT
Start: 2018-08-10

## 2018-08-10 RX ORDER — HYDROMORPHONE HYDROCHLORIDE 2 MG/1
2-4 TABLET ORAL EVERY 4 HOURS PRN
Qty: 10 TABLET | Refills: 0 | Status: SHIPPED | OUTPATIENT
Start: 2018-08-10 | End: 2018-08-10

## 2018-08-10 RX ORDER — HYDROMORPHONE HYDROCHLORIDE 2 MG/1
2-4 TABLET ORAL EVERY 6 HOURS PRN
Qty: 10 TABLET | Refills: 0
Start: 2018-08-10

## 2018-08-10 RX ORDER — FERROUS SULFATE 325(65) MG
325 TABLET ORAL DAILY
Qty: 30 TABLET | Refills: 0 | Status: SHIPPED | OUTPATIENT
Start: 2018-08-10 | End: 2018-08-10

## 2018-08-10 RX ORDER — ACETAMINOPHEN 325 MG/1
650 TABLET ORAL EVERY 8 HOURS PRN
Qty: 30 TABLET | Refills: 0 | COMMUNITY
Start: 2018-08-10 | End: 2018-08-15

## 2018-08-10 RX ADMIN — SULFAMETHOXAZOLE AND TRIMETHOPRIM 1 TABLET: 800; 160 TABLET ORAL at 07:45

## 2018-08-10 RX ADMIN — INSULIN ASPART 5 UNITS: 100 INJECTION, SOLUTION INTRAVENOUS; SUBCUTANEOUS at 09:23

## 2018-08-10 RX ADMIN — METFORMIN HYDROCHLORIDE 1000 MG: 500 TABLET ORAL at 07:45

## 2018-08-10 RX ADMIN — INSULIN DETEMIR 42 UNITS: 100 INJECTION, SOLUTION SUBCUTANEOUS at 09:20

## 2018-08-10 RX ADMIN — SENNOSIDES AND DOCUSATE SODIUM 1 TABLET: 8.6; 5 TABLET ORAL at 09:20

## 2018-08-10 RX ADMIN — GABAPENTIN 1200 MG: 600 TABLET, FILM COATED ORAL at 07:45

## 2018-08-10 RX ADMIN — LEVOTHYROXINE SODIUM 50 MCG: 50 TABLET ORAL at 07:45

## 2018-08-10 RX ADMIN — ONDANSETRON 4 MG: 4 TABLET, ORALLY DISINTEGRATING ORAL at 10:49

## 2018-08-10 RX ADMIN — GABAPENTIN 1200 MG: 600 TABLET, FILM COATED ORAL at 13:40

## 2018-08-10 RX ADMIN — HYDROMORPHONE HYDROCHLORIDE 2 MG: 2 TABLET ORAL at 10:49

## 2018-08-10 RX ADMIN — SIMETHICONE CHEW TAB 80 MG 80 MG: 80 TABLET ORAL at 10:27

## 2018-08-10 ASSESSMENT — PAIN DESCRIPTION - DESCRIPTORS
DESCRIPTORS: ACHING;SORE
DESCRIPTORS: ACHING

## 2018-08-10 ASSESSMENT — ACTIVITIES OF DAILY LIVING (ADL)
ADLS_ACUITY_SCORE: 21
ADLS_ACUITY_SCORE: 23
ADLS_ACUITY_SCORE: 21
ADLS_ACUITY_SCORE: 21
ADLS_ACUITY_SCORE: 23

## 2018-08-10 NOTE — PROGRESS NOTES
Gynecology Oncology Progress Note  8/10/18    24hr events:  - Brice removed, voided  - Transitioned to subQ insulin  - GI consult, recommended labs drawn this AM    Subjective:   Feeling well. Denies nausea, vomiting, SOB, CP, fevers, chills. Passing flatus. No BM. Reports mild abdominal soreness and chronic back pain.     Objective:   Patient Vitals for the past 24 hrs:   BP Temp Temp src Heart Rate Resp SpO2 Weight   08/10/18 0420 153/69 98.5  F (36.9  C) Oral 94 18 94 % 125 kg (275 lb 9.2 oz)   08/10/18 0000 138/63 98.9  F (37.2  C) Oral 85 18 93 % -   08/09/18 1925 129/66 99.2  F (37.3  C) Oral 93 18 95 % -   08/09/18 1618 133/67 98.5  F (36.9  C) Oral 93 18 93 % -   08/09/18 1236 122/59 98.9  F (37.2  C) Oral - 18 - -   08/09/18 1138 129/61 98.9  F (37.2  C) Oral - - - -   08/09/18 1100 125/59 - - 91 - 93 % -   08/09/18 1038 120/54 99.1  F (37.3  C) - - 17 - -   08/09/18 1030 - - - - 17 - -   08/09/18 1023 126/59 99.2  F (37.3  C) - 93 16 - -   08/09/18 0835 - - - - - 98 % -   08/09/18 0700 114/65 98.7  F (37.1  C) Oral 85 23 96 % -     I/O last 3 completed shifts:  In: 270.71 [P.O.:240; I.V.:30.71]  Out: 1215 [Urine:1215]  Since MN: IN none recorded    mL [urine]    General: lying in bed, in NAD  CV: RRR, no m/r/g  Resp: wheezing throughout, non labored breathing  Abdomen: soft, minimally tender in midline lower abdomen, not distended, obese  Incision: port sites c/d/i, bandages overlying (x6) - leaking serosanginuous fluid from midline camera port site. Incision intact with no surrounding erythema  Extremities: R BKA, no edema or erythema. Left without edema or erythema    New labs/imaging-    8/10/2018 04:58   Sodium 139   Potassium 3.7   Chloride 106   Carbon Dioxide 26   Urea Nitrogen 18   Creatinine 0.54   GFR Estimate >90   GFR Estimate If Black >90   Calcium 8.4 (L)   Anion Gap 6      8/10/2018 04:58   WBC 3.2 (L)   Hemoglobin 9.5 (L)   Hematocrit 30.5 (L)   Platelet Count 34 (LL)   RBC Count  2.99 (L)    (H)   MCH 31.8   MCHC 31.1 (L)   RDW 15.1 (H)      8/9/2018 11:47 8/9/2018 14:27 8/9/2018 19:40 8/9/2018 22:01 8/10/2018 04:58   Glucose 263 (H) 241 (H) 315 (H) 331 (H) 256 (H)       Assessment: 64 yo POD#2 s/p DA TLH, BSO, Pelvic LN dissection, cysto - post op course c/b pulmonary edema, poorly controlled BG, persistent thrombocytopenia and an isolated fever-- Doing well this morning.    Dz: Grade 2 endometrioid adenocarcinoma  FEN: regular diet  Pain: TAP block, tylenol, oxycodone, IV dilaudid  Heme: Hgb 11.5> > 10.6 (intraop)>>9.4, stable  Thrombocytopenia, plts starting at 35, s/p total of 8u plt, stable at 34. Txf for plt<20K. INR stable at 1.30, PTT 30, fibrinogen 321.  CV: NI  Pulm: USHA, COPD, current smoker, CXR w/ b/l pulmonary edema. S/p 20mg IV lasix on POD#0. Encourage IS use. Duonebs q4hr. Limit IVF  GI: ADAT, prn antiemetics and bowel regimen, cirrhosis on laparoscopy. GI/hepatology consulted and recommended labs drawn this AM, will f/u as outpatient  : Brice replaced as unable to void POD#0, now voiding spontaneously  ID: S/p ancef. Ppx bactrim for h/o osteomyelitis. Tm 102.1F (2000), Tl 100.4 (2115). Will monitor. If febrile again will send BCx and UCx. Fever likely due to plt txf reaction vs atelectasis.  Endo: Type 2DM, A1C 6.2 (6/19). S/p insulin drip, currently on Detemir 42 BID, Novolog 5/5/5, metformin 1000 BID, HSSI> increase novolog to home 28/30/28 as poor control with 5 at dinner last night. (home regimen: Novolog 28U/30/28, Detemir 42 BID, metformin 1000 BID)  - Hypothyroid on levothyroxine 50 mcg daily  Psych/Neuro: Depression, peripheral neuropathy, chronic pain - gabapentin 1200 TID  MSK: R BKA, L toe amputee. S/p PT recommending home with assist from   PPX: SCDs, IS.   Dispo: pending post-op recovery, likely today    Kristal Denise MD  Gyn Onc PGY4  Pager [phone]    I have examined this patient personally with the team and agree with the above  findings and plan.    Lewis Segal

## 2018-08-10 NOTE — PROGRESS NOTES
The patient is being discharged today, no new recs fro ma GI standpoint, she should be scheduled for follow up with GI post discharge, labs will be followed up on then.     Natalio Jenkins  Internal medicine, PGY3

## 2018-08-10 NOTE — PLAN OF CARE
Problem: Patient Care Overview  Goal: Plan of Care/Patient Progress Review  Outcome: Improving  A/O X4. Able to make needs known, uses call light appropriately. Up w/ 1 assist to wheel chair/ commode. C/o abdominal pain at lap sites, pt declines pain medication. Needs encouragement to wear abdominal binder. HR SR 80-90's, VSS; afebrile; RA while awake, 1.5 L NC while sleeping. Adequate urine output. UA sent to lab. Tolerating moderate carb diet. Will continue to monitor  and follow plan of care.

## 2018-08-10 NOTE — OP NOTE
Gynecologic Oncology Operative Report    8/10/2018  Fani Escobedo  [id number]    Pre-Procedure Diagnosis: Grade 1 Endometrial Adenocarcinoma   Post- Procedure Diagnosis:  Endometrioid adenocarcinoma (frozen: FIGO grade 2, 2.6 cm, less than 50% myoinvasion)  Procedure: Davinci assisted TLH, BSO, cystoscopy, bilateral pelvic lymph node dissection, injection of ICG dye for sentinel nodes however Firefly did not work on DaVinci.     Surgeon: Trudy Cortez MD  Assistants:   Dee Dee Arellano, GYN ONC fellow  Jaclyn Denise, PGY-4  Chapo Montgomery, MS4     EBL: 200 mL  IVF: 1500 mL crystalloid, 250 mL colloid, 3U platelets  UOP: 200 mL     Specimens: Uterus, cervix, bilateral ovaries and fallopian tubes; right pelvic lymph nodes; left pelvic lymph nodes  Complications: None     Findings:  EUA with small mobile uterus. On laparoscopy, cirrhotic liver.  Small normal appearing uterus, normal appearing bilateral fallopian tubes and ovaries.  Difficulty with visualization due to large amount of bowel and obese abdomen.  Oozing at end of case, surgiflo and surgicel used on surgical sites as platelets returned < 30,000 upon recheck. Cystoscopy with bilateral efflux from the ureteral openings and no evidence of bladder injury.    COMPLICATIONS: None.     CONDITION: Stable to PACU.     PROCEDURE:   Consent was reviewed with the patient in the preoperative setting and confirmed. She received prophylactic antibiotics.  The patient was transferred to the operating room and placed in dorsal supine position. General anesthetic was obtained in the usual manner without noted difficulties. The patient was then positioned onto Alek stirrups and an exam under anesthesia was performed with findings as described above.  The patient was prepped and draped for the above-mentioned procedure and Brice catheter was then placed under sterile techniques.  Timeout was called at which point the patient's name, procedure and operative site was confirmed  by the operative team. Initially, the instruments for the vaginal manipulator were positioned, a speculum was placed in the vagina. The anterior lip of the cervix was grasped, the uterus sounded to 7cm and cervix was serially dilated. 4 ml ICG dye was injected at 3 and 9 oclock position. The large VCare vaginal manipulator was then inserted and the vaginal balloon was then inserted to obtain intraabdominal pressure.     Attention was then turned to the upper abdomen. Initially, an incision was made in the umbilicus  and the Veress needle introduced through this stab incision. The abdomen was insufflated with an opening pressure of 5 mmHg. The Veress needle was removed. Sites for the da Marley laparoscopic ports were demarcated, total of 5, initiating with this midline incision above the umbilicus and positioned in a semicircular fashion around the upper abdomen. The initial midline 8 mm port was inserted without difficulties, the left 2 lateral 8 mm da Marley ports and the right 8 mm airseal port for an assistant port was placed  all under direct visualization without any injury noted to underlying structures. At this point, the patient was placed into steep Trendelenburg. Her bowel however was very difficult to move out of the pelvis making the visualization very difficult. Based on this a 12 mm port was placed in the RUQ to allow for use of the paddle throughout the case to assisted with visualization. The pelvis was inspected as well as the upper abdomen with findings as noted above-there was some ascites in the pelvis with a multinodular liver most consistent with cirrhosis. Pelvic washings were obtained and sent for cytology. We attempted to move the bowels to pack them into the upper abdomen with gentle traction however this was quite difficult throughout the case due to the patient's size and inability to go into steep trendelenburg . At this point, the da Marley was docked onto the ports, all appropriate  instruments were inserted.     Attention was then turned to the pelvis. The round ligaments were identified bilaterally. They were divided using cautery and the retroperitoneal space was entered by dissecting the peritoneum lateral and cephalad to the IP ligaments. An attempt was made to identify the sentinel lymph nodes however the Firefly feature on the DaVinci was not working. Thus, the ureters were identified and a defect was made underneath the IP ligament and above the ureter. The IP ligament was serially cauterized and then divided sharply. The rest of the peritoneum was skeletonized down to the level of the uterine arteries which were then cauterized and divided.  The bladder flap was created using cautery down to just below the level of the uterine manipulator cup. The rest of the parametrial tissue was dissected off of the uterus serially cauterized and divided sharply. A colpotomy was made on the anterior side and carried around to the posterior side of the uterine manipulator cup using the electrocautery. The uterus was removed through the vagina and sent for frozen section pathology which revealed  Grade 2 adenocarcinoma 2.6 cm tumor.      Given the frozen section results, we proceeded with a lymph node disesction.     Next, we performed the right pelvic lymph node dissection. At this point, peritoneum overlying the right external iliac artery was incised. The borders of the pelvic lymph node dissection included cephalad bifurcation of the common iliac artery, caudad to the circumflex iliac vein, medially the ureter and lateral to genitofemoral nerve. Lymph nodes overlying the iliac vessels towards the external iliac artery and vein, were elevated and resected and additional lymph nodes within the obturator space were removed above the level of the nerve without injury to the nerve. Hemostasis was obtained. Attention was turned towards the left. In a similar manner, the left pelvic lymph node dissection  was performed with the borders extending from the common iliac artery to the deep circumflex iliac vein, medially the ureter and laterally genitofemoral nerve. We were able to retract the ureter again medially out of harm's way.  In addition, we removed the lymph nodes within the obturator space above the level of the nerve without injury to the nerve.  The right and left pelvic lymph nodes were placed in separate EndoCatch bags and marked for retrieval later. The nodes were ultimately removed through the vagina.   Due to her morbid obesity, oozing due to thrombocytopenia,  and difficulty with ventilating this patient we did not perform the paraaortic lymph node dissection. Surgicel and surgiflo were placed over the vaginal cuff and in the arturo dissection beds.  The vaginal cuff was then closed using 2 overlapping v lock sutures from each apex of the incision.   Next, we performed cystoscopy using 30-degree angled scope and noted normal bladder mucosa, no evidence of foreign body and brisk efflux from the bilateral ureteral orifices. At this point, the vaginal balloon was removed from the vagina. All laparoscopic instruments and ports were now removed and CO2 allowed to escape from the abdomen. Skin was reapproximated with 4-0 Vicryl sutures and steristrips applied.     Sponge, lap and needle counts were reported as correct x2 and instrument count was correct x1.     I was scrubbed and present for the entire procedure.  Trudy Cortez MD    Department of Ob/Gyn and Women's Health  Division of Gynecologic Oncology  [facility]  [phone]

## 2018-08-10 NOTE — PLAN OF CARE
DISCHARGE                         No discharge date for patient encounter.  ----------------------------------------------------------------------------  Discharged to: Home  Via: private transportation  Accompanied by: Family  Discharge Instructions: regular diet, activity restrictions following hysterectomy, medications, follow up appointments, when to call the MD, aftercare instructions.  Prescriptions: To be filled by [place] discharge pharmacy; medication list reviewed & sent with pt  Follow Up Appointments: arranged; information given  Belongings: All sent with pt  IV: d/c'd  Telemetry: d/c'd  Pt exhibits understanding of above discharge instructions; all questions answered.    Discharge Paperwork: Signed, copied, and sent home with patient.

## 2018-08-11 ENCOUNTER — TELEPHONE (OUTPATIENT)
Dept: ONCOLOGY | Facility: CLINIC | Age: 63
End: 2018-08-11

## 2018-08-11 DIAGNOSIS — R14.0 ABDOMINAL DISTENSION (GASEOUS): ICD-10-CM

## 2018-08-11 DIAGNOSIS — N30.00 ACUTE CYSTITIS WITHOUT HEMATURIA: Primary | ICD-10-CM

## 2018-08-11 LAB — SMA IGG SER-ACNC: 13 UNITS (ref 0–19)

## 2018-08-11 RX ORDER — NITROFURANTOIN 25; 75 MG/1; MG/1
100 CAPSULE ORAL 2 TIMES DAILY
Qty: 14 CAPSULE | Refills: 0 | Status: SHIPPED | OUTPATIENT
Start: 2018-08-11

## 2018-08-11 RX ORDER — SIMETHICONE 125 MG
125 CAPSULE ORAL 2 TIMES DAILY PRN
Qty: 28 CAPSULE | Refills: 0 | Status: SHIPPED | OUTPATIENT
Start: 2018-08-11

## 2018-08-11 NOTE — TELEPHONE ENCOUNTER
Called Ms. Escobedo with the results of her Urine culture. This showed <100K lactose fermenting GNR consistent with UTI. She endorses urgency, frequency and incontinence and is still having dysuria. Wrote for Macrobid and pyridium to be sent to [facility] in [place]. She has a cephalosporin/penicillin allergy and therefore we will avoid those.    Still feels like she is bloated as well, so wrote a prescription for simethicone    Pt denies fevers, chills, n/v. She is passing gas and is having bowel movements. She is most uncomfortable due to the urgency. She thanked me for the call. Recommended she call the clinic to check in with symptoms on Monday.    Lindsey Child MD  Obstetrics and Gyncology, PGY-2  August 11, 2018 , 4:59 PM

## 2018-08-12 ENCOUNTER — TELEPHONE (OUTPATIENT)
Dept: OTHER | Facility: CLINIC | Age: 63
End: 2018-08-12

## 2018-08-12 ENCOUNTER — NURSE TRIAGE (OUTPATIENT)
Dept: NURSING | Facility: CLINIC | Age: 63
End: 2018-08-12

## 2018-08-12 ENCOUNTER — TELEPHONE (OUTPATIENT)
Dept: ONCOLOGY | Facility: CLINIC | Age: 63
End: 2018-08-12

## 2018-08-12 LAB
A1AT PHENOTYP SERPL-IMP: NORMAL
A1AT SERPL-MCNC: 170 MG/DL (ref 90–200)
BACTERIA SPEC CULT: ABNORMAL
BACTERIA SPEC CULT: ABNORMAL
Lab: ABNORMAL
SPECIMEN SOURCE: ABNORMAL

## 2018-08-12 NOTE — TELEPHONE ENCOUNTER
Called patient to follow up on incision drainage --see telephone note from Dr Child for details of complaint. It continues to be very bothersome and is interfering with her sleep as she is wet and has to constantly change the dressings. It is the same watery pinkish discharge, not changed but she is frustrated.  I encouraged her to come into the ED if she cannot tolerate it any longer, otherwise we can get her a clinic appt this week, which can be arranged tomorrow. She said she does not want to come into the ED but will call the clinic tmr morning to see about coming into clinic tmr. She would like a bag to place over the incision that she can change.    Larisa Cardoso MD PGY3

## 2018-08-12 NOTE — TELEPHONE ENCOUNTER
"Pt called nurse line due to leaking of incision. She is a 63 year old who is postoperative day 4 s/p da mari assisted TLH, BSO and bilateral pelvic LND for Grade 2 endometrioid adenocarcinoma. Comorbodities include Thrombocytopenia, T2DM, COPD, USHA, hypothyroidism and morbid obesity. There was also concern for cirrhosis visualized during surgery and it was recommended she follow up with GI.     She was discharged home on 8/10, the umbilical port site she noticed started leaking while she was in the hospital, they used pressure dressings and eventually \"purple rubbery stuff\" (likely dermabond) to stop the leaking before she discharged. Today she felt like the dermabond popped and since then she has been leaking. The discharge is pink-tinged and unchanged from what it was in the hospital, though maybe a little more. She has tried to use a towel to soak it up, went through a small towel in about two hours.     She denies fevers, chill, n/v, malodor from the discharge and no purulence. She does not \"feel sick\".     Discussed that it is difficult to know for sure over the phone, but that this discharge seems similar to what she had in the hospital, which was not concerning at the time. This could be in part worse due to her likely cirrhosis, causing increased fluid and pressure in her abdomen. Recommended she try and place a pressure dressing with the gauze and tape she has at home and then  more gauze when she goes to the pharmacy to  her UTI antibiotics tomorrow. I cannot know for sure that this isn't due to infection or other worrisome cause and recommended Fani come in to the ED if she was concerned this was something more than it was in the hospital two days ago. She does not want to come into the ED at this time, will try the pressure dressing.     Will have someone call tomorrow to check in, recommended she be seen in our clinic earlier this week (appt scheduled 8/27) to have the incision inspected. "     Lindsey Child MD  Obstetrics and Gyncology, PGY-2  August 12, 2018 , 12:48 AM

## 2018-08-12 NOTE — TELEPHONE ENCOUNTER
"Caller: self  Reason for call: \"I had surgery recently, I came home yesterday, while in the hospital one of the wounds started leaking and they put some rubber-like glue on top to seal it, and now tonight it popped, I don't know where, I can't see where, but I soaked a bath towel already, what should I do?\" Reports not noticing the wound leaking until pulling her arm across her stomach. Patient is s/p DaVinci Assisted Total Laparoscopic Hysterectomy, Bilateral Salpingo-Oophorectomy, Cystoscopy, Bilateral Pelvic Lymph Node Dissection on 8/8/18 for endometrial cancer,  Symptoms: surgical wound leaking (< 1 inch, located 3 inches above belly button), \"pinkish\" drainage, stomach pain from bloating (from surgery), right sided pain (\"a bad pain in my side\")  Symptoms started while in the hospital and again tonight.   Denies jillian blood, fever, vomiting, chills, or odor.  Pain? yes. Location: right sided. Rated: \"3/10 when breathing normal, and 10/10 when deep breathing\" . Constant or intermittent?  intermittent.   Pain worsens when taking a deep breath.     Home cares tried: using towels to soak up drainage.  Care advice given per triage guideline; advised caller to speak to on call surgeon now for 2nd level triage. Caller verbalized understanding of care advice given and plans to speak to on call provider now. Caller had no further questions.   This nurse paged the on call provider (on call GYN/ONC provider) for the [facility] clinic at 12:20am via [facility]  ([phone]) to call the patient back directly at [phone] for 2nd level triage. Caller advised to call FNA back if no call from provider within 20-30 minutes.    Tsering Worley RN  [facility]      Reason for Disposition    Dressing soaked with blood or body fluid (e.g., drainage)     Surgical wound opened tonight and is draining pinkish fluid    Additional Information    Negative: [1] Major abdominal surgical incision AND [2] wound gaping " "open AND [3] visible internal organs    Negative: Sounds like a life-threatening emergency to the triager    Negative: [1] Bleeding from incision AND [2] won't stop after 10 minutes of direct pressure    Negative: [1] Widespread rash AND [2] bright red, sunburn-like    Negative: Severe pain in the incision    Negative: [1] Suture came out early AND [2] wound gaping AND [3] < 48 hours since sutures placed    Negative: [1] Incision gaping open AND [2] length of opening > 2 inches (5 cm)    Negative: Patient sounds very sick or weak to the triager    Negative: Sounds like a serious complication to the triager    Negative: Fever > 100.5 F (38.1 C)    Negative: [1] Incision looks infected (spreading redness, pain) AND [2] fever > 99.5 F (37.5 C)    Negative: [1] Incision looks infected (spreading redness, pain) AND [2] large red area (> 2 in. or 5 cm)    Negative: [1] Incision looks infected (spreading redness, pain) AND [2] face wound    Negative: [1] Red streak runs from the incision AND [2] longer than 1 inch (2.5 cm)    Negative: [1] Pus or bad-smelling fluid draining from incision AND [2] no fever    Negative: [1] Post-op pain AND [2] not controlled with pain medications    Answer Assessment - Initial Assessment Questions  1. SYMPTOM: \"What's the main symptom you're concerned about?\" (e.g., redness, pain, drainage)     Wound drainage  2. ONSET: \"When did ________  start?\"      tonight  3. SURGERY: \"What surgery was performed?\"      S/p robotic hysterectomy  4. DATE of SURGERY: \"When was surgery performed?\"       8/8/18  5. INCISION SITE: \"Where is the incision located?\"        3 inches above belly button  6. REDNESS: \"Is there any redness at the incision site?\" If yes, ask: \"How wide across is the redness?\" (Inches, centimeters)       Mild redness  7. PAIN: \"Is there any pain?\" If so, ask: \"How bad is it?\"  (Scale 1-10; or mild, moderate, severe)      Yes, bloated stomach pain and right sided pain  8. BLEEDING: \"Is " "there any bleeding?\" If so, ask: \"How much?\" and \"Where?\"      denies  9. DRAINAGE: \"Is there any drainage from the incision site?\" If yes, ask: \"What color and how much?\" (e.g., red, cloudy, pus; drops, teaspoon)      Yes, pinkish, soaked a bath towel  10. FEVER: \"Do you have a fever?\" If so, ask: \"What is your temperature, how was it measured, and when did it start?\"        denies  11. OTHER SYMPTOMS: \"Do you have any other symptoms?\" (e.g., shaking chills, weakness, rash elsewhere on body)        denies    Protocols used: POST-OP INCISION SYMPTOMS-ADULT-AH      "

## 2018-08-13 ENCOUNTER — HOSPITAL ENCOUNTER (EMERGENCY)
Facility: CLINIC | Age: 63
Discharge: HOME | End: 2018-08-13
Attending: EMERGENCY MEDICINE | Admitting: EMERGENCY MEDICINE
Payer: COMMERCIAL

## 2018-08-13 ENCOUNTER — TELEPHONE (OUTPATIENT)
Dept: ONCOLOGY | Facility: CLINIC | Age: 63
End: 2018-08-13

## 2018-08-13 VITALS
HEART RATE: 86 BPM | TEMPERATURE: 98.5 F | BODY MASS INDEX: 38.8 KG/M2 | DIASTOLIC BLOOD PRESSURE: 78 MMHG | SYSTOLIC BLOOD PRESSURE: 133 MMHG | OXYGEN SATURATION: 94 % | HEIGHT: 69 IN | WEIGHT: 262 LBS | RESPIRATION RATE: 18 BRPM

## 2018-08-13 DIAGNOSIS — T81.31XA POSTOPERATIVE WOUND DEHISCENCE, INITIAL ENCOUNTER: ICD-10-CM

## 2018-08-13 PROCEDURE — 99283 EMERGENCY DEPT VISIT LOW MDM: CPT | Mod: 25

## 2018-08-13 PROCEDURE — 25000125 ZZHC RX 250: Performed by: EMERGENCY MEDICINE

## 2018-08-13 PROCEDURE — 12020 TX SUPFC WND DEHSN SMPL CLSR: CPT

## 2018-08-13 RX ORDER — ONDANSETRON 4 MG/1
4 TABLET, ORALLY DISINTEGRATING ORAL ONCE
Status: COMPLETED | OUTPATIENT
Start: 2018-08-13 | End: 2018-08-13

## 2018-08-13 RX ADMIN — ONDANSETRON 4 MG: 4 TABLET, ORALLY DISINTEGRATING ORAL at 17:14

## 2018-08-13 ASSESSMENT — ENCOUNTER SYMPTOMS
ABDOMINAL PAIN: 1
FEVER: 0
SHORTNESS OF BREATH: 0
WOUND: 1

## 2018-08-13 NOTE — TELEPHONE ENCOUNTER
Received call from patient. States she continues to have clear, pink drainage from incision above umbilicus. Drainage increasing and is soaking large incontinence pads. Expresses frustration that it is not improving and she is tired of the mess it is causing, she is unable to sleep at night because of all the drainage. Denies any signs of infection. Discussed with patient about applying pressure dressing and she states this was tried in the hospital and failed. She would like to have a bag attached over the wound so she would not have to deal with continually needing to changed dressing. Writer consulted Sola Ceja NP; she feels patient would need ostomy supplies and [facility] does not have access to [facility] nurse/supplies. She could assess patient today and assist with pressure dressing and possibly ordering supplies. Patient does not want to come to clinic unless she can obtain a bag to collect her draining incision. Patient states she will go to ED.  Xi Negron  RN, BSN, OCN

## 2018-08-13 NOTE — DISCHARGE INSTRUCTIONS
Wound Check After Surgery, No Complication  Surgery involves cutting through layers of skin, fatty tissue, muscle, and sometimes bone and cartilage. Sutures (stitches) or staples are used to close all layers of the wound. The sutures on the inside will dissolve in about 2 to 3 weeks. Any sutures or staples used on the outside need to be removed in about 7 to 14 days, depending on the location.  It is normal to have some clear or bloody discharge on the wound covering or bandage (dressing) for the first few days after surgery. If your wound was sutured (sewn) closed, you should not have to change the dressing more than twice a day in the first few days. Bleeding or discharge requiring more frequent dressing changes can be a sign of a problem.  It is normal to feel pain at the incision site. The pain decreases as the wound heals. Most of the pain and soreness from the skin incision should go away by the time the sutures or staples are removed. Soreness and pain from deeper tissues may last another week or two.  Pain that continues more than a few weeks after surgery or pain that worsens anytime after surgery can be a sign of a problem, such as:    Infection    Separation of wound edges    Collection of blood or other below the skin  Home care  Different types of surgery require different types of care and dressing changes. It is important to follow all instructions and advice from your surgeon, as well as other members of your healthcare team.  Wound care    Keep the wound clean, as directed by your healthcare provider.    Change the dressing as directed. Change the dressing sooner if it becomes wet or stained with blood or fluid from the wound.    Bathe with a sponge (no shower or tub baths) for the first few days after surgery, or until there is no more drainage from the wound. Unless you received different instructions from your surgeon, you can then shower. Do not soak the area in water (no baths or swimming)  until the tape, sutures, or staples are removed and any wound opening has dried out and healed.  Changing the dressing    Wash your hands before changing the dressings.    Carefully remove the dressing and tape; don t just yank it off. If it sticks to the wound, you may need to wet it a little to remove it, unless your healthcare provider told you not to wet it.    Wash your hands again before putting on a new, clean dressing.    Gently clean the wound with clean water (or saline) using gauze or a clean washcloth. Don't rub it or pick at it.    Don't use soap, alcohol, hydrogen peroxide, or any other cleanser.    If you were told to dry the wound before putting on a new dressing, gently pat it dry. Don't rub.    Throw out the old dressing. Don't reuse it!    Wash your hands again when you are done.  Types of dressings  Your healthcare team will tell you what type of dressing to put on your wound. Follow your healthcare team s instructions carefully, and contact them if you have any questions. Two common types of dressings are described below. You may have one of these or another type.    Dry dressing. Use dry gauze. If the wound is still draining, use a  nonadherent  dressing, which shouldn t stick to the wound.    Wet-to-dry dressing. Wet the gauze, and squeeze out the excess water (or saline), before putting it on. Then, cover this with a dry pad.  Medicines    If you were given antibiotics, take them until they are used up or your healthcare provider tells you to stop. It is important to finish the antibiotics even though you feel better, to make sure the infection has cleared.    You can take acetaminophen or ibuprofen for pain, unless you were given a different pain medicine to use. If you have chronic liver or kidney disease, or have ever had a stomach ulcer or gastrointestinal bleeding, or are taking blood thinner medicines, talk with your healthcare provider before using these medicines.    Aspirin should  never be used in anyone under 18 years of age who is ill with a fever. It may cause severe liver damage.  Follow-up care  Follow up with your healthcare provider, or as advised, for your next wound check or removal of your sutures, staples, or tape.    If a culture was done, you will be notified if the results will affect your treatment. You can call as directed for the results.    If imaging tests, such as X-rays, an ultrasound, or CT scan were done, they will be reviewed by a specialist. You will be notified of the results, especially if they affect treatment.  Call 911  Call 911 if any of these occur:    Trouble breathing or swallowing, wheezing    Hoarse voice or trouble speaking    Extreme confusion    Extreme drowsiness or trouble awakening    Fainting or loss of consciousness    Rapid heart rate or very slow heart rate    Vomiting blood, or large amounts of blood in stool    Discomfort in the center of the chest that feels like pressure, squeezing, a sense of fullness, or pain.    Discomfort or pain in other upper body areas, such as the back, one or both arms, neck, jaw, or stomach    Stroke symptoms (spot a stroke  FAST )  ? F: Face drooping. One side of the face is numb or droops.  ? A: Arm weakness. One arm feels weak or numb.  ? S: Speech difficulty: Speech is slurred, or the person is unable to speak.  ? T: Time to call 911. Even if symptoms go away, call 911.  When to seek medical advice  Call your healthcare provider right away if any of the following occur:    Increasing pain at the site of surgery    Fever of 100.4  F (38  C) or higher, or as directed by your healthcare provider    Redness around the wound    Fluid, pus, or blood draining from the wound    Vomiting, constipation, or diarrhea  Date Last Reviewed: 9/27/2015 2000-2017 [employer]. [address] [place] [address], [place] [address]. All rights reserved. This information is not intended as a substitute for professional medical  care. Always follow your healthcare professional's instructions.

## 2018-08-13 NOTE — ED AVS SNAPSHOT
Emergency Department    [address] [facility] [address]    Phone:  [phone]    Fax:  [phone]                                       Fani Escobedo   MRN: [id number]    Department:   Emergency Department   Date of Visit:  8/13/2018           Patient Information     Date Of Birth          1955        Your diagnoses for this visit were:     Postoperative wound dehiscence, initial encounter        You were seen by Win Garrett DO.      Follow-up Information     Follow up with Trudy Cortez MD.    Specialties:  Oncology, OB/Gyn    Why:  Call for close clinic follow-up, sutures should be removed in 10-14 days    Contact information:    [facility] [address] [phone]          Follow up with  Emergency Department.    Specialty:  EMERGENCY MEDICINE    Why:  If symptoms worsen    Contact information:    [address] [facility] [address] [phone]        Discharge Instructions         Wound Check After Surgery, No Complication  Surgery involves cutting through layers of skin, fatty tissue, muscle, and sometimes bone and cartilage. Sutures (stitches) or staples are used to close all layers of the wound. The sutures on the inside will dissolve in about 2 to 3 weeks. Any sutures or staples used on the outside need to be removed in about 7 to 14 days, depending on the location.  It is normal to have some clear or bloody discharge on the wound covering or bandage (dressing) for the first few days after surgery. If your wound was sutured (sewn) closed, you should not have to change the dressing more than twice a day in the first few days. Bleeding or discharge requiring more frequent dressing changes can be a sign of a problem.  It is normal to feel pain at the incision site. The pain decreases as the wound heals. Most of the pain and soreness from the skin incision should go away by the time the sutures or staples are removed.  Soreness and pain from deeper tissues may last another week or two.  Pain that continues more than a few weeks after surgery or pain that worsens anytime after surgery can be a sign of a problem, such as:    Infection    Separation of wound edges    Collection of blood or other below the skin  Home care  Different types of surgery require different types of care and dressing changes. It is important to follow all instructions and advice from your surgeon, as well as other members of your healthcare team.  Wound care    Keep the wound clean, as directed by your healthcare provider.    Change the dressing as directed. Change the dressing sooner if it becomes wet or stained with blood or fluid from the wound.    Bathe with a sponge (no shower or tub baths) for the first few days after surgery, or until there is no more drainage from the wound. Unless you received different instructions from your surgeon, you can then shower. Do not soak the area in water (no baths or swimming) until the tape, sutures, or staples are removed and any wound opening has dried out and healed.  Changing the dressing    Wash your hands before changing the dressings.    Carefully remove the dressing and tape; don t just yank it off. If it sticks to the wound, you may need to wet it a little to remove it, unless your healthcare provider told you not to wet it.    Wash your hands again before putting on a new, clean dressing.    Gently clean the wound with clean water (or saline) using gauze or a clean washcloth. Don't rub it or pick at it.    Don't use soap, alcohol, hydrogen peroxide, or any other cleanser.    If you were told to dry the wound before putting on a new dressing, gently pat it dry. Don't rub.    Throw out the old dressing. Don't reuse it!    Wash your hands again when you are done.  Types of dressings  Your healthcare team will tell you what type of dressing to put on your wound. Follow your healthcare team s instructions  carefully, and contact them if you have any questions. Two common types of dressings are described below. You may have one of these or another type.    Dry dressing. Use dry gauze. If the wound is still draining, use a  nonadherent  dressing, which shouldn t stick to the wound.    Wet-to-dry dressing. Wet the gauze, and squeeze out the excess water (or saline), before putting it on. Then, cover this with a dry pad.  Medicines    If you were given antibiotics, take them until they are used up or your healthcare provider tells you to stop. It is important to finish the antibiotics even though you feel better, to make sure the infection has cleared.    You can take acetaminophen or ibuprofen for pain, unless you were given a different pain medicine to use. If you have chronic liver or kidney disease, or have ever had a stomach ulcer or gastrointestinal bleeding, or are taking blood thinner medicines, talk with your healthcare provider before using these medicines.    Aspirin should never be used in anyone under 18 years of age who is ill with a fever. It may cause severe liver damage.  Follow-up care  Follow up with your healthcare provider, or as advised, for your next wound check or removal of your sutures, staples, or tape.    If a culture was done, you will be notified if the results will affect your treatment. You can call as directed for the results.    If imaging tests, such as X-rays, an ultrasound, or CT scan were done, they will be reviewed by a specialist. You will be notified of the results, especially if they affect treatment.  Call 911  Call 911 if any of these occur:    Trouble breathing or swallowing, wheezing    Hoarse voice or trouble speaking    Extreme confusion    Extreme drowsiness or trouble awakening    Fainting or loss of consciousness    Rapid heart rate or very slow heart rate    Vomiting blood, or large amounts of blood in stool    Discomfort in the center of the chest that feels like  pressure, squeezing, a sense of fullness, or pain.    Discomfort or pain in other upper body areas, such as the back, one or both arms, neck, jaw, or stomach    Stroke symptoms (spot a stroke  FAST )  ? F: Face drooping. One side of the face is numb or droops.  ? A: Arm weakness. One arm feels weak or numb.  ? S: Speech difficulty: Speech is slurred, or the person is unable to speak.  ? T: Time to call 911. Even if symptoms go away, call 911.  When to seek medical advice  Call your healthcare provider right away if any of the following occur:    Increasing pain at the site of surgery    Fever of 100.4  F (38  C) or higher, or as directed by your healthcare provider    Redness around the wound    Fluid, pus, or blood draining from the wound    Vomiting, constipation, or diarrhea  Date Last Reviewed: 9/27/2015 2000-2017 [employer]. [address] [place] [address]. All rights reserved. This information is not intended as a substitute for professional medical care. Always follow your healthcare professional's instructions.          Your next 10 appointments already scheduled     Aug 15, 2018 11:00 AM CDT   (Arrive by 10:45 AM)   Return General Liver with Chapo Mendez MD   [facility] ([facility])    [address] [place] [address] [phone]            Aug 27, 2018 12:30 PM CDT   Post Op with Trudy Cortez MD   [facility]    [address] [place] [address] [phone]              24 Hour Appointment Hotline       To make an appointment at any [facility], call [phone]). If you don't have a family doctor or clinic, we will help you find one. [facility] are conveniently located to serve the needs of you and your family.             Review of your medicines      Our records show that you are taking the medicines  listed below. If these are incorrect, please call your family doctor or clinic.        Dose / Directions Last dose taken    acetaminophen 325 MG tablet   Commonly known as:  TYLENOL   Dose:  650 mg   Quantity:  30 tablet        Take 2 tablets (650 mg) by mouth every 8 hours as needed for mild pain   Refills:  0        ACIDOPHILUS PO   Dose:  1 capsule        Take 1 capsule by mouth daily   Refills:  0        ferrous sulfate 325 (65 Fe) MG tablet   Commonly known as:  IRON   Dose:  325 mg   Quantity:  30 tablet        Take 1 tablet (325 mg) by mouth daily   Refills:  0        GABAPENTIN PO   Dose:  1200 mg        Take 1,200 mg by mouth 3 times daily 2 x 600 mg tab   Refills:  0        HYDROmorphone 2 MG tablet   Commonly known as:  DILAUDID   Dose:  2-4 mg   Quantity:  10 tablet        Take 1-2 tablets (2-4 mg) by mouth every 6 hours as needed for moderate to severe pain   Refills:  0        KLOR-CON 8 MEQ CR tablet   Dose:  16 mEq   Generic drug:  potassium chloride        Take 16 mEq by mouth 2 times daily AM and PM in addition to afternoon dose   Refills:  0        LEVEMIR FLEXPEN/FLEXTOUCH 100 UNIT/ML injection   Dose:  42 Units   Generic drug:  insulin detemir        Inject 42 Units Subcutaneous 2 times daily   Refills:  0        LEVOTHYROXINE SODIUM PO   Dose:  50 mcg        Take 50 mcg by mouth daily   Refills:  0        METFORMIN HCL PO   Dose:  1000 mg        Take 1,000 mg by mouth 2 times daily (with meals)   Refills:  0        nitroFURantoin (macrocrystal-monohydrate) 100 MG capsule   Commonly known as:  MACROBID   Dose:  100 mg   Quantity:  14 capsule        Take 1 capsule (100 mg) by mouth 2 times daily   Refills:  0        * NOVOLOG SC   Dose:  28 Units        Inject 28 Units Subcutaneous daily (with breakfast)   Refills:  0        * NOVOLOG SC   Dose:  30 Units        Inject 30 Units Subcutaneous daily (with lunch)   Refills:  0        * NOVOLOG SC   Dose:  28 Units        Inject 28 Units Subcutaneous  daily (with dinner)   Refills:  0        phenazopyridine 97.5 MG tablet   Commonly known as:  AZO   Dose:  195 mg   Quantity:  12 tablet        Take 2 tablets (195 mg) by mouth 3 times daily   Refills:  0        senna-docusate 8.6-50 MG per tablet   Commonly known as:  SENOKOT-S;PERICOLACE   Dose:  2 tablet   Quantity:  100 tablet        Take 2 tablets by mouth 2 times daily as needed for constipation   Refills:  0        Simethicone 125 MG Caps   Dose:  125 mg   Quantity:  28 capsule        Take 125 mg by mouth 2 times daily as needed   Refills:  0        sulfamethoxazole-trimethoprim 800-160 MG per tablet   Commonly known as:  BACTRIM DS/SEPTRA DS   Dose:  1 tablet   Indication:  History of Osteomyelitis.        Take 1 tablet by mouth 2 times daily   Refills:  0        vitamin B complex with vitamin C Tabs tablet   Commonly known as:  STRESS TAB   Dose:  1 tablet        Take 1 tablet by mouth daily.   Refills:  0        * Notice:  This list has 3 medication(s) that are the same as other medications prescribed for you. Read the directions carefully, and ask your doctor or other care provider to review them with you.            Orders Needing Specimen Collection     None      Pending Results     No orders found from 8/11/2018 to 8/14/2018.            Pending Culture Results     No orders found from 8/11/2018 to 8/14/2018.            Pending Results Instructions     If you had any lab results that were not finalized at the time of your Discharge, you can call the ED Lab Result RN at [phone]. You will be contacted by this team for any positive Lab results or changes in treatment. The nurses are available 7 days a week from 10A to 6:30P.  You can leave a message 24 hours per day and they will return your call.        Test Results From Your Hospital Stay               Clinical Quality Measure: Blood Pressure Screening     Your blood pressure was checked while you were in the emergency department today. The last  reading we obtained was  BP: 133/78 . Please read the guidelines below about what these numbers mean and what you should do about them.  If your systolic blood pressure (the top number) is less than 120 and your diastolic blood pressure (the bottom number) is less than 80, then your blood pressure is normal. There is nothing more that you need to do about it.  If your systolic blood pressure (the top number) is 120-139 or your diastolic blood pressure (the bottom number) is 80-89, your blood pressure may be higher than it should be. You should have your blood pressure rechecked within a year by a primary care provider.  If your systolic blood pressure (the top number) is 140 or greater or your diastolic blood pressure (the bottom number) is 90 or greater, you may have high blood pressure. High blood pressure is treatable, but if left untreated over time it can put you at risk for heart attack, stroke, or kidney failure. You should have your blood pressure rechecked by a primary care provider within the next 4 weeks.  If your provider in the emergency department today gave you specific instructions to follow-up with your doctor or provider even sooner than that, you should follow that instruction and not wait for up to 4 weeks for your follow-up visit.        Thank you for choosing [facility]       Thank you for choosing [facility] for your care. Our goal is always to provide you with excellent care. Hearing back from our patients is one way we can continue to improve our services. Please take a few minutes to complete the written survey that you may receive in the mail after you visit with us. Thank you!        Care EveryWhere ID     This is your Care EveryWhere ID. This could be used by other organizations to access your [facility] medical records  [id number]        Equal Access to Services     CANDACE BENITEZ AH: Olvin Luis, basilia styles, grace calvo  la'fer timmy. So [facility] [phone].    ATENCIÓN: Si habla español, tiene a hayes disposición servicios gratuitos de asistencia lingüística. Llame al [phone].    We comply with applicable federal civil rights laws and [place] laws. We do not discriminate on the basis of race, color, national origin, age, disability, sex, sexual orientation, or gender identity.            After Visit Summary       This is your record. Keep this with you and show to your community pharmacist(s) and doctor(s) at your next visit.

## 2018-08-13 NOTE — ED PROVIDER NOTES
History     Chief Complaint:  Wound Check     HPI   Fani Escobedo is a 63 year old female, with a recent diagnosis of endometrial cancer, who presents with a wound check after a total hysterectomy on Wednesday 08/08/2018 and was discharged from the hospital on 08/10/2018 . According to the patient, she has been having constant drainage from her surgical port site since Friday. The patient noted that the drainage has increased substantially since Friday. She denies having a fever, shortness of breath, and chest pain.  The patient said that her abdominal pain is a 5/10 and unchanged from time of surgery. For the most part, the drainage has been clear. However, she sometimes has some fluid that is tinged with a pinkish color. The patient has been changing her dressing every 2 hours because she has been soiling the bandage constantly with the wound discharge. She noted that she smokes a pack of cigarettes a day and that she has COPD.     Allergies:  Adhesive tape  Cephalosporins  Naproxen  Penicillins  Augmentin  Benadryl  Morphine    Medications:    Iron  Gabapentin  Dilaudid   Insulin  Levothyroxine  Metformin  Macrobid  Azo  Senokot  Simethicone  Bactrim    Past Medical History:    Anemia  Charcot foot secondary to DM  COPD  Depression  DM  Hypothyroid  Obesity  USHA  Osteomyelitis  Peripheral neuropathy  MRSA  Sepsis  Discitis  Acute respiratory failure  Pancytopenia  Thrombocytopenia  Endometrial cancer    Past Surgical History:    Amputate leg below knee   Amputate toe (s)   Amputation (L) 2nd toe   Anesthesia out of or MRI   Bone marrow biopsy, bone specimen, needle/trocar   Cystoscopy   DaVinci Hysterectomy total, bilateral salpingo-oophorectomy, combined   Dilation and curettage  Explore spine, remove hardware, combined  Fusion spine anterior thoracic one level   Hysterectomy radical, salpingo-oophorectomy, node dissection, DaVinci assisted TLH, BSO,BPLND  Incision and drainage rectum, combined   Incision and  "drainage spine, close wound, combined  Irrigation and debridement trunk, combined   Optical tracking system fusion posterior spine thoracic three + levels     Family History:    The patient denies any relevant family medical history.    Social History:  The patient was accompanied to the ED by .  Smoking Status: Yes  Smokeless Tobacco: No  Alcohol Use: Yes  Marital Status:     Review of Systems   Constitutional: Negative for fever.   Respiratory: Negative for shortness of breath.    Cardiovascular: Negative for chest pain.   Gastrointestinal: Positive for abdominal pain.   Skin: Positive for wound.   All other systems reviewed and are negative.     Physical Exam   Vitals:  Patient Vitals for the past 24 hrs:   BP Temp Temp src Pulse Resp SpO2 Height Weight   08/13/18 1558 133/78 98.5  F (36.9  C) Oral 86 18 94 % 1.753 m (5' 9\") 118.8 kg (262 lb)     Physical Exam  General: Alert and cooperative with exam. Patient in mild distress. Normal mentation.  Head:  Scalp is NC/AT  Eyes:  No scleral icterus, PERRL  ENT:  The external nose and ears are normal. The oropharynx is normal and without erythema; mucus membranes are moist. Uvula midline, no evidence of deep space infection.  Neck:  Normal range of motion without rigidity.  CV:  Regular rate and rhythm    No pathologic murmur   Resp:  Breath sounds are clear bilaterally    Non-labored, no retractions or accessory muscle use  GI:  Abdomen is soft, no distension, minimal diffuse tenderness. No peritoneal signs. Obese. Mild persistent clear fluid periumbilical port site as well as left lower quadrant port site.  Periumbilical port site demonstrates 1 cm centimeter area of very mild wound dehiscence, left lower quadrant port site demonstrates 0.5 cm area very mild wound dehiscence  MS:  No lower extremity edema   Skin:  Warm and dry, No rash or lesions noted.  Neuro: Oriented x 3. No gross motor deficits.        Emergency Department Course     Procedures:    "  Wound Repair      WOUND :  2 simple clean 0.5 cm and 1 cm areas of dehiscence to abdominal port sites from recent surgery.     LOCATION: Abdomen.    ANESTHESIA: 0.25% bupivacaine with epinephrine; a total of 3 cc    PREPARATION: Chlorhexidine    DEBRIDEMENT:  no debridement.    CLOSURE:  Wounds closed with 4.0 Nylon  figure-of-eight sutures, x3 total    Interventions:  1714 Zofran-ODT, 4 mg, Oral     Emergency Department Course:  Nursing notes and vitals reviewed.  1535 I had my initial encounter with the patient.  I performed an exam of the patient as documented above.   I repaired the postoperative wound on the patient.     1720 I discussed the treatment plan with the patient. They expressed understanding of this plan and consented to discharge. They will be discharged home with instructions for care and follow up. In addition, the patient will return to the emergency department with any new or worsening symptoms.  All questions were answered.    Impression & Plan      Medical Decision Making:  Patient is a 63-year-old female who presents with persistent fluid leakage from surgical sites from recent DaVinci laparoscopic hysterectomy secondary to endometrial cancer.  Patient's medical history and records reviewed.  Patient denied fever and has no evidence of surrounding erythema or signs of infection.  There was very mild wound dehiscence with mild persistent clear fluid leakage from 2 of the port sites.  A total of 3 figure-of-eight sutures were placed with resolution of leakage.  The wounds were dressed and recommended close follow-up with Dr. Cortez for reevaluation; sutures to be removed in 10-14 days.  Additionally patient did report some mild nausea was provided Zofran.  No indication for further labs or imaging at this time.  Informal bedside ultrasound showed no evidence of abscess or significant ascites.  Abdominal exam without significant tenderness; patient's discomfort is unchanged from previous.   Patient discharged home.  At the time discharge patient was hemodynamically stable, neurologically intact, and afebrile.    Diagnosis:    ICD-10-CM    1. Postoperative wound dehiscence, initial encounter T81.31XA      Disposition:   Discharge     Scribe Disclosure:  I, Ankit Perez, am serving as a scribe at 4:40 PM on 8/13/2018 to document services personally performed by Win Garrett DO based on my observations and the provider's statements to me.  8/13/2018    EMERGENCY DEPARTMENT       Win Garrett DO  08/14/18 1514

## 2018-08-13 NOTE — ED TRIAGE NOTES
Pt here with wound concerns post hysterectomy last Wednesday. Pt states she has to change bandages every hour due to the heavy drainage. Pt states drainage is clear with a pink tinge. Pt has no fever. Pt is morbidly obese, diabetic and AKA on right. Pt states she has a very low plt count of 26,000.

## 2018-08-13 NOTE — ED AVS SNAPSHOT
Emergency Department    [address] [place] [address]    Phone:  [phone]    Fax:  [phone]                                       Fani Escobedo   MRN: [id number]    Department:   Emergency Department   Date of Visit:  8/13/2018           After Visit Summary Signature Page     I have received my discharge instructions, and my questions have been answered. I have discussed any challenges I see with this plan with the nurse or doctor.    ..........................................................................................................................................  Patient/Patient Representative Signature      ..........................................................................................................................................  Patient Representative Print Name and Relationship to Patient    ..................................................               ................................................  Date                                            Time    ..........................................................................................................................................  Reviewed by Signature/Title    ...................................................              ..............................................  Date                                                            Time

## 2018-08-14 ENCOUNTER — TELEPHONE (OUTPATIENT)
Dept: GASTROENTEROLOGY | Facility: CLINIC | Age: 63
End: 2018-08-14

## 2018-08-14 ENCOUNTER — TELEPHONE (OUTPATIENT)
Dept: ONCOLOGY | Facility: CLINIC | Age: 63
End: 2018-08-14

## 2018-08-14 LAB — COPATH REPORT: NORMAL

## 2018-08-14 NOTE — TELEPHONE ENCOUNTER
Received message from scheduling that patient called and requested a callback. Attempted to reach patient on cell phone. Left  with callback number for writer.  Xi Negron  RN, BSN, OCN  Spoke to patient. Midline laparoscopic incision has 2 sutures from yesterday's ED visit and stopped leaking for a short time yesterday. She was given a couple ostomy bags and has emptied it twice since 8pm yesterday. She would like an appointment for this to be assessed. Concerned it will not heal when it is constantly wet. Message sent to NP and was instructed she is trying to set up a WOCN appointment for patient tomorrow to coordinate with her GI appointment tomorrow at [facility].

## 2018-08-14 NOTE — TELEPHONE ENCOUNTER
Left message for pt reminding them of upcoming appointment.  Instructed pt to bring updated medications list.  Instructed pt to arrive an hour and a half to two hours prior to appt time for labs.  Angelito Germain, CMA

## 2018-08-15 ENCOUNTER — OFFICE VISIT (OUTPATIENT)
Dept: GASTROENTEROLOGY | Facility: CLINIC | Age: 63
End: 2018-08-15
Attending: INTERNAL MEDICINE
Payer: COMMERCIAL

## 2018-08-15 ENCOUNTER — TELEPHONE (OUTPATIENT)
Dept: ONCOLOGY | Facility: CLINIC | Age: 63
End: 2018-08-15

## 2018-08-15 VITALS
BODY MASS INDEX: 38.8 KG/M2 | SYSTOLIC BLOOD PRESSURE: 114 MMHG | OXYGEN SATURATION: 96 % | DIASTOLIC BLOOD PRESSURE: 57 MMHG | WEIGHT: 262 LBS | TEMPERATURE: 98.4 F | RESPIRATION RATE: 20 BRPM | HEART RATE: 89 BPM | HEIGHT: 69 IN

## 2018-08-15 DIAGNOSIS — K74.60 HEPATIC CIRRHOSIS, UNSPECIFIED HEPATIC CIRRHOSIS TYPE (H): Primary | ICD-10-CM

## 2018-08-15 PROBLEM — N95.0 POSTMENOPAUSAL VAGINAL BLEEDING: Status: RESOLVED | Noted: 2018-06-05 | Resolved: 2018-08-15

## 2018-08-15 PROBLEM — N92.0 EXCESSIVE OR FREQUENT MENSTRUATION: Status: RESOLVED | Noted: 2018-07-09 | Resolved: 2018-08-15

## 2018-08-15 PROBLEM — R93.89 ABNORMAL FINDING ON ULTRASOUND: Status: RESOLVED | Noted: 2018-06-06 | Resolved: 2018-08-15

## 2018-08-15 PROCEDURE — G0463 HOSPITAL OUTPT CLINIC VISIT: HCPCS | Mod: ZF

## 2018-08-15 RX ORDER — SPIRONOLACTONE 100 MG/1
100 TABLET, FILM COATED ORAL DAILY
Qty: 30 TABLET | Refills: 2 | Status: SHIPPED | OUTPATIENT
Start: 2018-08-15 | End: 2018-08-22

## 2018-08-15 RX ORDER — FUROSEMIDE 40 MG
40 TABLET ORAL DAILY
Qty: 30 TABLET | Refills: 2 | Status: SHIPPED | OUTPATIENT
Start: 2018-08-15 | End: 2018-08-22

## 2018-08-15 ASSESSMENT — PAIN SCALES - GENERAL: PAINLEVEL: NO PAIN (0)

## 2018-08-15 ASSESSMENT — PATIENT HEALTH QUESTIONNAIRE - PHQ9
SUM OF ALL RESPONSES TO PHQ QUESTIONS 1-9: 21
SUM OF ALL RESPONSES TO PHQ QUESTIONS 1-9: 21
10. IF YOU CHECKED OFF ANY PROBLEMS, HOW DIFFICULT HAVE THESE PROBLEMS MADE IT FOR YOU TO DO YOUR WORK, TAKE CARE OF THINGS AT HOME, OR GET ALONG WITH OTHER PEOPLE: VERY DIFFICULT

## 2018-08-15 NOTE — MR AVS SNAPSHOT
After Visit Summary   8/15/2018    Fani Escobedo    MRN: [id number]           Patient Information     Date Of Birth          1955        Visit Information        Provider Department      8/15/2018 11:00 AM Chapo Vargas MD [facility]        Today's Diagnoses     Hepatic cirrhosis, unspecified hepatic cirrhosis type (H)    -  1      Care Instructions    Plan  1.  Start lasix 40mg once per day  2.  Start aldactone 100mg once per day  3.  Check blood work next week at [facility] in [place]  4.  Low salt diet (max 2g per day)  5.  My nurse will be in touch to help arrange drainage of your abdomen (paracentesis)  6.  Follow-up in the office with my PA in 6 weeks    Chapo Stinson MD  Hepatology  [facility]          Follow-ups after your visit        Follow-up notes from your care team     Return in about 6 weeks (around 9/26/2018).      Your next 10 appointments already scheduled     Aug 27, 2018 12:30 PM CDT   [facility] with [facility] Colon [facility]   [facility]    [address] [place] [id number] [phone]            Sep 24, 2018 12:00 PM CDT   (Arrive by 11:45 AM)   Return General Liver with Moon Carpenter PA-C   [facility] ([facility])    [address] [place] [id number] [phone]              Future tests that were ordered for you today     Open Future Orders        Priority Expected Expires Ordered    CBC with platelets Routine  9/14/2018 8/15/2018    INR Routine  9/14/2018 8/15/2018    Basic metabolic panel Routine  9/14/2018 8/15/2018    Hepatic panel Routine  9/14/2018 8/15/2018            Who to contact     If you have questions or need follow up information about today's clinic visit or your schedule please contact [facility]PAT[facility] directly at [phone].  Normal or non-critical lab and imaging results will be  "communicated to you by MyChart, letter or phone within 4 business days after the clinic has received the results. If you do not hear from us within 7 days, please contact the clinic through MyChart or phone. If you have a critical or abnormal lab result, we will notify you by phone as soon as possible.  Submit refill requests through [employer] or call your pharmacy and they will forward the refill request to us. Please allow 3 business days for your refill to be completed.          Additional Information About Your Visit        Care EveryWhere ID     This is your Care EveryWhere ID. This could be used by other organizations to access your [place] medical records  [id number]        Your Vitals Were     Pulse Temperature Respirations Height Pulse Oximetry BMI (Body Mass Index)    89 98.4  F (36.9  C) (Oral) 20 1.753 m (5' 9\") 96% 38.69 kg/m2       Blood Pressure from Last 3 Encounters:   08/15/18 114/57   08/13/18 133/78   08/10/18 134/70    Weight from Last 3 Encounters:   08/15/18 118.8 kg (262 lb)   08/13/18 118.8 kg (262 lb)   08/10/18 125 kg (275 lb 9.2 oz)                 Today's Medication Changes          These changes are accurate as of 8/15/18 12:27 PM.  If you have any questions, ask your nurse or doctor.               Start taking these medicines.        Dose/Directions    furosemide 40 MG tablet   Commonly known as:  LASIX   Used for:  Hepatic cirrhosis, unspecified hepatic cirrhosis type (H)   Started by:  Chapo Vargas MD        Dose:  40 mg   Take 1 tablet (40 mg) by mouth daily   Quantity:  30 tablet   Refills:  2       spironolactone 100 MG tablet   Commonly known as:  ALDACTONE   Used for:  Hepatic cirrhosis, unspecified hepatic cirrhosis type (H)   Started by:  Chapo Vargas MD        Dose:  100 mg   Take 1 tablet (100 mg) by mouth daily   Quantity:  30 tablet   Refills:  2            Where to get your medicines      These medications were sent to [id number] - " [place], [place] - [address] AT [facility]  [address], [place] [address]     Phone:  [phone]     furosemide 40 MG tablet    spironolactone 100 MG tablet                Primary Care Provider Office Phone # Fax #    Marielos Nicholson [phone]       [place] [address] [address] [facility] [address]  [facility] [address]        Equal Access to Services     CANDACE BENITEZ : Hadii aad ku hadasho Soomaali, waaxda luqadaha, qaybta kaalmada adeegyada, waxay idiin hayaan adeeg kharash la'ellen . So [facility] [phone].    ATENCIÓN: Si habla español, tiene a hayes disposición servicios gratuitos de asistencia lingüística. [facility] [phone].    We comply with applicable federal civil rights laws and [place] laws. We do not discriminate on the basis of race, color, national origin, age, disability, sex, sexual orientation, or gender identity.            Thank you!     Thank you for choosing [facility]PAT[facility]  for your care. Our goal is always to provide you with excellent care. Hearing back from our patients is one way we can continue to improve our services. Please take a few minutes to complete the written survey that you may receive in the mail after your visit with us. Thank you!             Your Updated Medication List - Protect others around you: Learn how to safely use, store and throw away your medicines at www.disposemymeds.org.          This list is accurate as of 8/15/18 12:27 PM.  Always use your most recent med list.                   Brand Name Dispense Instructions for use Diagnosis    ACIDOPHILUS PO      Take 1 capsule by mouth daily        ferrous sulfate 325 (65 Fe) MG tablet    IRON    30 tablet    Take 1 tablet (325 mg) by mouth daily    Pancytopenia (H), Endometrial cancer (H), Excessive or frequent menstruation       furosemide 40 MG tablet    LASIX    30 tablet    Take 1 tablet (40 mg) by mouth daily    Hepatic cirrhosis, unspecified hepatic cirrhosis  type (H)       GABAPENTIN PO      Take 1,200 mg by mouth 3 times daily 2 x 600 mg tab        HYDROmorphone 2 MG tablet    DILAUDID    10 tablet    Take 1-2 tablets (2-4 mg) by mouth every 6 hours as needed for moderate to severe pain    Endometrial cancer (H)       KLOR-CON 8 MEQ CR tablet   Generic drug:  potassium chloride      Take 16 mEq by mouth 2 times daily AM and PM in addition to afternoon dose        LEVEMIR FLEXPEN/FLEXTOUCH 100 UNIT/ML injection   Generic drug:  insulin detemir      Inject 42 Units Subcutaneous 2 times daily        LEVOTHYROXINE SODIUM PO      Take 50 mcg by mouth daily        METFORMIN HCL PO      Take 1,000 mg by mouth 2 times daily (with meals)        nitroFURantoin (macrocrystal-monohydrate) 100 MG capsule    MACROBID    14 capsule    Take 1 capsule (100 mg) by mouth 2 times daily    Acute cystitis without hematuria       * NOVOLOG SC      Inject 28 Units Subcutaneous daily (with breakfast)        * NOVOLOG SC      Inject 30 Units Subcutaneous daily (with lunch)        * NOVOLOG SC      Inject 28 Units Subcutaneous daily (with dinner)        phenazopyridine 97.5 MG tablet    AZO    12 tablet    Take 2 tablets (195 mg) by mouth 3 times daily    Acute cystitis without hematuria       senna-docusate 8.6-50 MG per tablet    SENOKOT-S;PERICOLACE    100 tablet    Take 2 tablets by mouth 2 times daily as needed for constipation    Endometrial cancer (H)       Simethicone 125 MG Caps     28 capsule    Take 125 mg by mouth 2 times daily as needed    Abdominal distension (gaseous)       spironolactone 100 MG tablet    ALDACTONE    30 tablet    Take 1 tablet (100 mg) by mouth daily    Hepatic cirrhosis, unspecified hepatic cirrhosis type (H)       sulfamethoxazole-trimethoprim 800-160 MG per tablet    BACTRIM DS/SEPTRA DS     Take 1 tablet by mouth 2 times daily        vitamin B complex with vitamin C Tabs tablet    STRESS TAB     Take 1 tablet by mouth daily.        * Notice:  This list has 3  medication(s) that are the same as other medications prescribed for you. Read the directions carefully, and ask your doctor or other care provider to review them with you.

## 2018-08-15 NOTE — LETTER
8/15/2018      RE: Fani Escobedo  [address] [place] [address]       [facility]    Hepatology New Patient Visit    Referring provider:  Referred Self      63 year old female    Chief complaint:  Cirrhosis     HPI:  Cirrhosis  - dx Aug 2018  - ?SIN  - hx ascites  - no hx HE or variceal bleed  - last EGD- nil  - HCC screening- CT C/A/P July 2018    The patient comes to clinic this morning with her  to establish care for newly diagnosed cirrhosis.  The patient was diagnosed with cirrhosis at the time of a laparotomy for a total abdominal hysterectomy for endometrial cancer in 08/2018.  She has had issues with edema and ascitic fluid wound drainage in the post-op period.  The patient denies any prior history of cirrhosis.  She does report being told that she had mildly abnormal LFTs approximately 20 years ago.  She has been evaluated for thrombocytopenia in the past.     The patient is doing okay today.  Her main complaint is abdominal distention and lower extremity edema since discharge from the hospital.  She has noted drainage of orange-colored fluid from her midline laparotomy wound.  She also reports lower extremity edema in her left leg and edema in the stump of her right lower extremity, meaning that she cannot fit her prosthesis properly.      The patient denies jaundice, lethargy or confusion.      No history of melena, hematemesis or hematochezia.      The patient denies fevers, sweats or chills.      Weight has overall increased since discharge from the hospital.      The patient denies any history of alcohol abuse.  She reports alcohol use once or twice per year.  No history of DUIs.      The patient is an ongoing smoker of 31 years at 1 pack per day.      She denies any history of recreational drugs, including marijuana, IN or IV drugs.       Medical hx Surgical hx   Past Medical History:   Diagnosis Date     Charcot foot due to diabetes mellitus (H)       COPD (chronic obstructive pulmonary disease) (H)      Depression      Diabetes mellitus (H)      Diabetic neuropathy (H)      Hypothyroid      Iron deficiency anemia      Obesity      USHA (obstructive sleep apnea)      Osteomyelitis (H)      Tobacco abuse       Past Surgical History:   Procedure Laterality Date     AMPUTATE LEG BELOW KNEE  4/14/2012    Procedure:AMPUTATE LEG BELOW KNEE; right leg below knee amputation; Surgeon:WILMAR LUI; Location: OR     AMPUTATE TOE(S)  5/1/2013    Procedure: AMPUTATE TOE(S);  PARTIAL LEFT 3RD TOE AMPUTATION;  Surgeon: Juancarlos Gamez DPM;  Location:  OR     AMPUTATION      (L) 2nd toe     ANESTHESIA OUT OF OR MRI  6/11/2013    Procedure: ANESTHESIA OUT OF OR MRI;  MRI UNDER GENERAL ANESTHESIA;  Surgeon: Provider, Generic Anesthesia;  Location:  OR     ANESTHESIA OUT OF OR MRI  9/4/2013    Procedure: ANESTHESIA OUT OF OR MRI;  ANESTHESIA OUT OF OR MRI;  Surgeon: Provider, Generic Anesthesia;  Location:  OR     BONE MARROW BIOPSY, BONE SPECIMEN, NEEDLE/TROCAR N/A 6/7/2018    Procedure: BIOPSY BONE MARROW;  bone marrow biopsy;  Surgeon: Farzana Teixeira MD;  Location:  GI     CYSTOSCOPY N/A 8/8/2018    Procedure: CYSTOSCOPY;;  Surgeon: Trudy Cortez MD;  Location: [facility] OR     DAVINCI HYSTERECTOMY TOTAL, BILATERAL SALPINGO-OOPHORECTOMY, COMBINED N/A 8/8/2018    Procedure: COMBINED DAVINCI HYSTERECTOMY TOTAL, SALPINGO-OOPHORECTOMY;  DaVinci Assisted Total Laparoscopic Hysterectomy, Bilateral Salpingo-Oophorectomy, Cystoscopy, Bilateral Pelvic Lymph Node Dissection;  Surgeon: Trudy Cortez MD;  [facility]: [facility] OR     DILATION AND CURETTAGE N/A 6/6/2018    Procedure: DILATION AND CURETTAGE;  DILATION AND CURETTAGE ;  Surgeon: Tip Ortega MD;  Location:  OR     EXPLORE SPINE, REMOVE HARDWARE, COMBINED  10/11/2013    Procedure: COMBINED EXPLORE SPINE, REMOVE HARDWARE;  EXPLORATION AND REVISION POSTERIOR THORACIC WOUND WITH WOUND VAC PLACEMENT.;   Surgeon: Thomas Ellis MD;  Location: [facility] OR     FUSION SPINE ANTERIOR THORACIC ONE LEVEL  9/5/2013    Procedure: FUSION SPINE ANTERIOR THORACIC ONE LEVEL;  Right Thoracotomy For  T7-T8 Thorasic Vertebral Body Resection with Strut Graft, [place] Instrumentation T6-T9, BMP, Pyramesh and Intra-operative Neuro Monitoring ;  Surgeon: Thomas Ellis MD;  Location:  OR     HYSTERECTOMY RADICAL, SALPINGO-OOPHORECTOMY, NODE DISSECTION  08/08/2018    DaVinci assisted TLH, BSO, BPLND; Surgeon: Trudy Cortez MD     INCISION AND DRAINAGE RECTUM, COMBINED  6/11/2012    Procedure: COMBINED INCISION AND DRAINAGE RECTUM;  I&D ABSCESS RIGHT BUTTOCK (MRSA );  Surgeon: Christos Linton MD;  Location:  OR     INCISION AND DRAINAGE SPINE, CLOSE WOUND, COMBINED  10/14/2013    Procedure: COMBINED INCISION AND DRAINAGE SPINE, CLOSE WOUND;  EXPLORATION/REVISION POSTERIOR THORACIC WOUND COMPLEX 15 CM. ;  Surgeon: Thomas Ellis MD;  Location:  OR     IRRIGATION AND DEBRIDEMENT TRUNK, COMBINED  6/10/2012    Procedure: COMBINED IRRIGATION AND DEBRIDEMENT TRUNK;  Incision and drainage abscess right buttock;  Surgeon: Christos Linton MD;  Location:  OR     OPTICAL TRACKING SYSTEM FUSION POSTERIOR SPINE THORACIC THREE+ LEVELS  9/6/2013    Procedure: OPTICAL TRACKING SYSTEM FUSION POSTERIOR SPINE THORACIC THREE+ LEVELS;  T4-T11 POSTERIOR LATERAL PEDICLE SCREW INSTRUMENTATION WITH IMAGE GUIDANCE AND NEURO-MONITORING;  Surgeon: Thomas Ellis MD;  Location:  OR          Medications  Prior to Admission medications    Medication Sig Start Date End Date Taking? Authorizing Provider   B Complex Vitamins (VITAMIN  B COMPLEX) tablet Take 1 tablet by mouth daily.   Yes Reported, Patient   ferrous sulfate (IRON) 325 (65 Fe) MG tablet Take 1 tablet (325 mg) by mouth daily 8/10/18  Yes Marielle Garibay APRN CNP   furosemide (LASIX) 40 MG tablet Take 1 tablet (40 mg) by mouth daily 8/15/18  11/13/18 Yes Chapo Vargas MD   GABAPENTIN PO Take 1,200 mg by mouth 3 times daily 2 x 600 mg tab   Yes Unknown, Entered By History   HYDROmorphone (DILAUDID) 2 MG tablet Take 1-2 tablets (2-4 mg) by mouth every 6 hours as needed for moderate to severe pain 8/10/18  Yes Marielle Garibay APRN CNP   Insulin Aspart (NOVOLOG SC) Inject 28 Units Subcutaneous daily (with breakfast)   Yes Unknown, Entered By History   Insulin Aspart (NOVOLOG SC) Inject 30 Units Subcutaneous daily (with lunch)   Yes Unknown, Entered By History   Insulin Aspart (NOVOLOG SC) Inject 28 Units Subcutaneous daily (with dinner)   Yes Unknown, Entered By History   insulin detemir (LEVEMIR FLEXPEN/FLEXTOUCH) 100 UNIT/ML injection Inject 42 Units Subcutaneous 2 times daily   Yes Unknown, Entered By History   Lactobacillus (ACIDOPHILUS PO) Take 1 capsule by mouth daily   Yes Unknown, Entered By History   LEVOTHYROXINE SODIUM PO Take 50 mcg by mouth daily    Yes Reported, Patient   METFORMIN HCL PO Take 1,000 mg by mouth 2 times daily (with meals)   Yes Unknown, Entered By History   nitroFURantoin, macrocrystal-monohydrate, (MACROBID) 100 MG capsule Take 1 capsule (100 mg) by mouth 2 times daily 8/11/18  Yes Lewis Segal MD   phenazopyridine (AZO) 97.5 MG tablet Take 2 tablets (195 mg) by mouth 3 times daily 8/11/18  Yes Lewis Segal MD   potassium chloride (KLOR-CON) 8 MEQ CR tablet Take 16 mEq by mouth 2 times daily AM and PM in addition to afternoon dose   Yes Unknown, Entered By History   senna-docusate (SENOKOT-S;PERICOLACE) 8.6-50 MG per tablet Take 2 tablets by mouth 2 times daily as needed for constipation 8/10/18  Yes Marielle Garibay APRN CNP   Simethicone 125 MG CAPS Take 125 mg by mouth 2 times daily as needed 8/11/18  Yes Lewis Segal MD   spironolactone (ALDACTONE) 100 MG tablet Take 1 tablet (100 mg) by mouth daily 8/15/18 11/13/18 Yes Chapo Vargas MD  "  sulfamethoxazole-trimethoprim (BACTRIM DS/SEPTRA DS) 800-160 MG per tablet Take 1 tablet by mouth 2 times daily   Yes Unknown, Entered By History       Allergies  Allergies   Allergen Reactions     Adhesive Tape      Cephalosporins Anaphylaxis     Naproxen Anaphylaxis     Penicillins      Augmentin Rash     Benadryl [Anti-Itch] Rash     Morphine Hcl Itching and Rash       Family hx Social hx   Family History   Problem Relation Age of Onset     Cerebrovascular Disease Mother      Alcoholism Father      Diabetes Sister      Diabetes Brother      Diabetes Brother      Diabetes Brother      Liver Disease No family hx of      Colon Cancer No family hx of       Social History   Substance Use Topics     Smoking status: Current Some Day Smoker     Packs/day: 1.00     Years: 45.00     Smokeless tobacco: Never Used     Alcohol use Yes      Comment: occ       Lives in [place] with .  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  Two adult twin sons.     Examination  /57 (BP Location: Right arm, Patient Position: Chair, Cuff Size: Adult Large)  Pulse 89  Temp 98.4  F (36.9  C) (Oral)  Resp 20  Ht 1.753 m (5' 9\")  Wt 118.8 kg (262 lb)  SpO2 96%  BMI 38.69 kg/m2  Body mass index is 38.69 kg/(m^2).    Gen- well, NAD, A+Ox3, normal color  Eye- EOMI  ENT- MMM, normal oropharynx  Lym- no palpable lymphadenopathy  CVS- S1, S2 normal, no added sounds, RRR  RS- CTA  Abd- obese, clear orange fluid draining from midline laparotomy into stoma bag, abdominal wall edema, soft, non-tender, flank dullness to percuss, no obvious organomegaly on palpation or percussion, BS+  Extr- pulses good, bilateral pitting ANNY  MS- hands normal- no clubbing  Neuro- A+Ox3, no asterixis  Skin- no rash or jaundice  Psych- normal mood    Laboratory  Lab Results   Component Value Date     08/10/2018    POTASSIUM 3.7 08/10/2018    CHLORIDE 106 08/10/2018    CO2 26 08/10/2018    BUN 18 08/10/2018    CR 0.54 " 08/10/2018       Lab Results   Component Value Date    BILITOTAL 0.7 08/09/2018    ALT 36 08/09/2018    AST 45 08/09/2018    ALKPHOS 56 08/09/2018       Lab Results   Component Value Date    ALBUMIN 2.9 08/09/2018    PROTTOTAL 5.9 08/09/2018        Lab Results   Component Value Date    WBC 3.2 08/10/2018    HGB 9.5 08/10/2018     08/10/2018    PLT 34 08/10/2018       Lab Results   Component Value Date    INR 1.27 08/09/2018 6/6/2018 07:45 8/9/2018 04:32 8/10/2018 04:58   Hep B Surface Agn  Nonreactive    Hepatitis C Antibody  Nonreactive    A1A Phenotype   M1M1   F-Actin Antibody IgG   13   Iron 66     Iron Binding Cap 235 (L)     Iron Saturation Index 28     Ferritin 156         Radiology  CT A/P July 2018 reviewed    Assessment  63 year old female with metabolic syndrome who presents to establish care for decompensated likely SIN cirrhosis complicated with ascites diagnosed at time of laparotomy for total abdominal hysterectomy for endometrial cancer.  MELD-Na= 9.  Evidence of ascites and lower extremity edema therefore will start diuretics and obtain paracentesis.  Will need upper endoscopy to screen for esophageal varices but this can be done once ascites is better controlled.  Up to date with HCC screening.    Plan  1.  Low Na diet  2.  Furosemide 40mg PO Q24  3.  Spironolactone 100mg PO Q24  4.  Check BMP next week  5.  Paracentesis  6.  Follow-up in 6 weeks with CALISTA Stinson MD  Hepatology  [facility]

## 2018-08-15 NOTE — PATIENT INSTRUCTIONS
Plan  1.  Start lasix 40mg once per day  2.  Start aldactone 100mg once per day  3.  Check blood work next week at [facility] in [place]  4.  Low salt diet (max 2g per day)  5.  My nurse will be in touch to help arrange drainage of your abdomen (paracentesis)  6.  Follow-up in the office with my PA in 6 weeks    Chapo Stinson MD  Hepatology  [facility]

## 2018-08-15 NOTE — TELEPHONE ENCOUNTER
Received vm from patient that she had not heard back from wound care at the [facility] to schedule appt. Contacted patient and informed her appointment made for Thurs 8/16/18. Patient states she cannot make that appointment because of a conflict with another appointment. She sees a wound nurse at [facility] for a foot wound and has an appointment 8/17/18. She will have it checked there. States incisional drainage unchanged.  Xi Negron  RN, BSN, OCN

## 2018-08-15 NOTE — PROGRESS NOTES
[facility]    Hepatology New Patient Visit    Referring provider:  Referred Self      63 year old female    Chief complaint:  Cirrhosis     HPI:  Cirrhosis  - dx Aug 2018  - ?SIN  - hx ascites  - no hx HE or variceal bleed  - last EGD- nil  - HCC screening- CT C/A/P July 2018    The patient comes to clinic this morning with her  to establish care for newly diagnosed cirrhosis.  The patient was diagnosed with cirrhosis at the time of a laparotomy for a total abdominal hysterectomy for endometrial cancer in 08/2018.  She has had issues with edema and ascitic fluid wound drainage in the post-op period.  The patient denies any prior history of cirrhosis.  She does report being told that she had mildly abnormal LFTs approximately 20 years ago.  She has been evaluated for thrombocytopenia in the past.     The patient is doing okay today.  Her main complaint is abdominal distention and lower extremity edema since discharge from the hospital.  She has noted drainage of orange-colored fluid from her midline laparotomy wound.  She also reports lower extremity edema in her left leg and edema in the stump of her right lower extremity, meaning that she cannot fit her prosthesis properly.      The patient denies jaundice, lethargy or confusion.      No history of melena, hematemesis or hematochezia.      The patient denies fevers, sweats or chills.      Weight has overall increased since discharge from the hospital.      The patient denies any history of alcohol abuse.  She reports alcohol use once or twice per year.  No history of DUIs.      The patient is an ongoing smoker of 31 years at 1 pack per day.      She denies any history of recreational drugs, including marijuana, IN or IV drugs.       Medical hx Surgical hx   Past Medical History:   Diagnosis Date     Charcot foot due to diabetes mellitus (H)      COPD (chronic obstructive pulmonary disease) (H)      Depression      Diabetes  mellitus (H)      Diabetic neuropathy (H)      Hypothyroid      Iron deficiency anemia      Obesity      USHA (obstructive sleep apnea)      Osteomyelitis (H)      Tobacco abuse       Past Surgical History:   Procedure Laterality Date     AMPUTATE LEG BELOW KNEE  4/14/2012    Procedure:AMPUTATE LEG BELOW KNEE; right leg below knee amputation; Surgeon:WILMAR LUI; Location: OR     AMPUTATE TOE(S)  5/1/2013    Procedure: AMPUTATE TOE(S);  PARTIAL LEFT 3RD TOE AMPUTATION;  Surgeon: Juancarlos Gamez DPM;  Location:  OR     AMPUTATION      (L) 2nd toe     ANESTHESIA OUT OF OR MRI  6/11/2013    Procedure: ANESTHESIA OUT OF OR MRI;  MRI UNDER GENERAL ANESTHESIA;  Surgeon: Provider, Generic Anesthesia;  Location:  OR     ANESTHESIA OUT OF OR MRI  9/4/2013    Procedure: ANESTHESIA OUT OF OR MRI;  ANESTHESIA OUT OF OR MRI;  Surgeon: Provider, Generic Anesthesia;  Location:  OR     BONE MARROW BIOPSY, BONE SPECIMEN, NEEDLE/TROCAR N/A 6/7/2018    Procedure: BIOPSY BONE MARROW;  bone marrow biopsy;  Surgeon: Farzana Teixeira MD;  Location:  GI     CYSTOSCOPY N/A 8/8/2018    Procedure: CYSTOSCOPY;;  Surgeon: Trudy Cortez MD;  [facility]: [facility]     DAVINCI HYSTERECTOMY TOTAL, BILATERAL SALPINGO-OOPHORECTOMY, COMBINED N/A 8/8/2018    Procedure: COMBINED DAVINCI HYSTERECTOMY TOTAL, SALPINGO-OOPHORECTOMY;  DaVinci Assisted Total Laparoscopic Hysterectomy, Bilateral Salpingo-Oophorectomy, Cystoscopy, Bilateral Pelvic Lymph Node Dissection;  Surgeon: Trudy Cortez MD;  Location: [facility] OR     DILATION AND CURETTAGE N/A 6/6/2018    Procedure: DILATION AND CURETTAGE;  DILATION AND CURETTAGE ;  Surgeon: Tip Ortega MD;  Location:  OR     EXPLORE SPINE, REMOVE HARDWARE, COMBINED  10/11/2013    Procedure: COMBINED EXPLORE SPINE, REMOVE HARDWARE;  EXPLORATION AND REVISION POSTERIOR THORACIC WOUND WITH WOUND VAC PLACEMENT.;  Surgeon: Thomas Ellis MD;  Location:  OR     FUSION SPINE ANTERIOR  THORACIC ONE LEVEL  9/5/2013    Procedure: FUSION SPINE ANTERIOR THORACIC ONE LEVEL;  Right Thoracotomy For  T7-T8 Thorasic Vertebral Body Resection with Strut Graft, [place] Instrumentation T6-T9, BMP, Pyramesh and Intra-operative Neuro Monitoring ;  Surgeon: Thomas Ellis MD;  Location:  OR     HYSTERECTOMY RADICAL, SALPINGO-OOPHORECTOMY, NODE DISSECTION  08/08/2018    DaVinci assisted TLH, BSO, BPLND; Surgeon: Trudy Cortez MD     INCISION AND DRAINAGE RECTUM, COMBINED  6/11/2012    Procedure: COMBINED INCISION AND DRAINAGE RECTUM;  I&D ABSCESS RIGHT BUTTOCK (MRSA );  Surgeon: Christos Linton MD;  Location:  OR     INCISION AND DRAINAGE SPINE, CLOSE WOUND, COMBINED  10/14/2013    Procedure: COMBINED INCISION AND DRAINAGE SPINE, CLOSE WOUND;  EXPLORATION/REVISION POSTERIOR THORACIC WOUND COMPLEX 15 CM. ;  Surgeon: Thomas Ellis MD;  Location:  OR     IRRIGATION AND DEBRIDEMENT TRUNK, COMBINED  6/10/2012    Procedure: COMBINED IRRIGATION AND DEBRIDEMENT TRUNK;  Incision and drainage abscess right buttock;  Surgeon: Christos Linton MD;  Location:  OR     OPTICAL TRACKING SYSTEM FUSION POSTERIOR SPINE THORACIC THREE+ LEVELS  9/6/2013    Procedure: OPTICAL TRACKING SYSTEM FUSION POSTERIOR SPINE THORACIC THREE+ LEVELS;  T4-T11 POSTERIOR LATERAL PEDICLE SCREW INSTRUMENTATION WITH IMAGE GUIDANCE AND NEURO-MONITORING;  Surgeon: Thomas Ellis MD;  Location:  OR          Medications  Prior to Admission medications    Medication Sig Start Date End Date Taking? Authorizing Provider   B Complex Vitamins (VITAMIN  B COMPLEX) tablet Take 1 tablet by mouth daily.   Yes Reported, Patient   ferrous sulfate (IRON) 325 (65 Fe) MG tablet Take 1 tablet (325 mg) by mouth daily 8/10/18  Yes Marielle Garibay APRN CNP   furosemide (LASIX) 40 MG tablet Take 1 tablet (40 mg) by mouth daily 8/15/18 11/13/18 Yes Chapo Vargas MD   GABAPENTIN PO Take 1,200 mg by mouth 3  times daily 2 x 600 mg tab   Yes Unknown, Entered By History   HYDROmorphone (DILAUDID) 2 MG tablet Take 1-2 tablets (2-4 mg) by mouth every 6 hours as needed for moderate to severe pain 8/10/18  Yes Marielle Garibay APRN CNP   Insulin Aspart (NOVOLOG SC) Inject 28 Units Subcutaneous daily (with breakfast)   Yes Unknown, Entered By History   Insulin Aspart (NOVOLOG SC) Inject 30 Units Subcutaneous daily (with lunch)   Yes Unknown, Entered By History   Insulin Aspart (NOVOLOG SC) Inject 28 Units Subcutaneous daily (with dinner)   Yes Unknown, Entered By History   insulin detemir (LEVEMIR FLEXPEN/FLEXTOUCH) 100 UNIT/ML injection Inject 42 Units Subcutaneous 2 times daily   Yes Unknown, Entered By History   Lactobacillus (ACIDOPHILUS PO) Take 1 capsule by mouth daily   Yes Unknown, Entered By History   LEVOTHYROXINE SODIUM PO Take 50 mcg by mouth daily    Yes Reported, Patient   METFORMIN HCL PO Take 1,000 mg by mouth 2 times daily (with meals)   Yes Unknown, Entered By History   nitroFURantoin, macrocrystal-monohydrate, (MACROBID) 100 MG capsule Take 1 capsule (100 mg) by mouth 2 times daily 8/11/18  Yes Lewis Segal MD   phenazopyridine (AZO) 97.5 MG tablet Take 2 tablets (195 mg) by mouth 3 times daily 8/11/18  Yes Lewis Segal MD   potassium chloride (KLOR-CON) 8 MEQ CR tablet Take 16 mEq by mouth 2 times daily AM and PM in addition to afternoon dose   Yes Unknown, Entered By History   senna-docusate (SENOKOT-S;PERICOLACE) 8.6-50 MG per tablet Take 2 tablets by mouth 2 times daily as needed for constipation 8/10/18  Yes Marielle Garibay APRN CNP   Simethicone 125 MG CAPS Take 125 mg by mouth 2 times daily as needed 8/11/18  Yes Lewis Segal MD   spironolactone (ALDACTONE) 100 MG tablet Take 1 tablet (100 mg) by mouth daily 8/15/18 11/13/18 Yes Chapo Vargas MD   sulfamethoxazole-trimethoprim (BACTRIM DS/SEPTRA DS) 800-160 MG per tablet Take 1 tablet by mouth 2  "times daily   Yes Unknown, Entered By History       Allergies  Allergies   Allergen Reactions     Adhesive Tape      Cephalosporins Anaphylaxis     Naproxen Anaphylaxis     Penicillins      Augmentin Rash     Benadryl [Anti-Itch] Rash     Morphine Hcl Itching and Rash       Family hx Social hx   Family History   Problem Relation Age of Onset     Cerebrovascular Disease Mother      Alcoholism Father      Diabetes Sister      Diabetes Brother      Diabetes Brother      Diabetes Brother      Liver Disease No family hx of      Colon Cancer No family hx of       Social History   Substance Use Topics     Smoking status: Current Some Day Smoker     Packs/day: 1.00     Years: 45.00     Smokeless tobacco: Never Used     Alcohol use Yes      Comment: occ       Lives in "[employer] with .  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  Two adult twin sons.     Review of systems  A 10-point review of systems was negative.    Examination  /57 (BP Location: Right arm, Patient Position: Chair, Cuff Size: Adult Large)  Pulse 89  Temp 98.4  F (36.9  C) (Oral)  Resp 20  Ht 1.753 m (5' 9\")  Wt 118.8 kg (262 lb)  SpO2 96%  BMI 38.69 kg/m2  Body mass index is 38.69 kg/(m^2).    Gen- well, NAD, A+Ox3, normal color  Eye- EOMI  ENT- MMM, normal oropharynx  Lym- no palpable lymphadenopathy  CVS- S1, S2 normal, no added sounds, RRR  RS- CTA  Abd- obese, clear orange fluid draining from midline laparotomy into stoma bag, abdominal wall edema, soft, non-tender, flank dullness to percuss, no obvious organomegaly on palpation or percussion, BS+  Extr- pulses good, bilateral pitting ANNY  MS- hands normal- no clubbing  Neuro- A+Ox3, no asterixis  Skin- no rash or jaundice  Psych- normal mood    Laboratory  Lab Results   Component Value Date     08/10/2018    POTASSIUM 3.7 08/10/2018    CHLORIDE 106 08/10/2018    CO2 26 08/10/2018    BUN 18 08/10/2018    CR 0.54 08/10/2018       Lab Results "   Component Value Date    BILITOTAL 0.7 08/09/2018    ALT 36 08/09/2018    AST 45 08/09/2018    ALKPHOS 56 08/09/2018       Lab Results   Component Value Date    ALBUMIN 2.9 08/09/2018    PROTTOTAL 5.9 08/09/2018        Lab Results   Component Value Date    WBC 3.2 08/10/2018    HGB 9.5 08/10/2018     08/10/2018    PLT 34 08/10/2018       Lab Results   Component Value Date    INR 1.27 08/09/2018 6/6/2018 07:45 8/9/2018 04:32 8/10/2018 04:58   Hep B Surface Agn  Nonreactive    Hepatitis C Antibody  Nonreactive    A1A Phenotype   M1M1   F-Actin Antibody IgG   13   Iron 66     Iron Binding Cap 235 (L)     Iron Saturation Index 28     Ferritin 156         Radiology  CT A/P July 2018 reviewed    Assessment  63 year old female with metabolic syndrome who presents to establish care for decompensated likely SIN cirrhosis complicated with ascites diagnosed at time of laparotomy for total abdominal hysterectomy for endometrial cancer.  MELD-Na= 9.  Evidence of ascites and lower extremity edema therefore will start diuretics and obtain paracentesis.  Will need upper endoscopy to screen for esophageal varices but this can be done once ascites is better controlled.  Up to date with HCC screening.    Plan  1.  Low Na diet  2.  Furosemide 40mg PO Q24  3.  Spironolactone 100mg PO Q24  4.  Check BMP next week  5.  Paracentesis  6.  Follow-up in 6 weeks with CALISTA Stinson MD  Hepatology  [facility]

## 2018-08-16 LAB — MITOCHONDRIA M2 IGG SER-ACNC: 2 U/ML

## 2018-08-16 ASSESSMENT — PATIENT HEALTH QUESTIONNAIRE - PHQ9: SUM OF ALL RESPONSES TO PHQ QUESTIONS 1-9: 21

## 2018-08-17 ENCOUNTER — CARE COORDINATION (OUTPATIENT)
Dept: GASTROENTEROLOGY | Facility: CLINIC | Age: 63
End: 2018-08-17

## 2018-08-17 ENCOUNTER — CARE COORDINATION (OUTPATIENT)
Dept: ONCOLOGY | Facility: CLINIC | Age: 63
End: 2018-08-17
Payer: COMMERCIAL

## 2018-08-17 DIAGNOSIS — K75.81 LIVER CIRRHOSIS SECONDARY TO NASH (H): Primary | ICD-10-CM

## 2018-08-17 DIAGNOSIS — T81.31XA POSTOPERATIVE WOUND DEHISCENCE: Primary | ICD-10-CM

## 2018-08-17 DIAGNOSIS — Z90.710 S/P HYSTERECTOMY: ICD-10-CM

## 2018-08-17 DIAGNOSIS — C54.1 ENDOMETRIAL CANCER (H): ICD-10-CM

## 2018-08-17 DIAGNOSIS — K74.60 LIVER CIRRHOSIS SECONDARY TO NASH (H): Primary | ICD-10-CM

## 2018-08-17 DIAGNOSIS — K74.60 HEPATIC CIRRHOSIS, UNSPECIFIED HEPATIC CIRRHOSIS TYPE (H): ICD-10-CM

## 2018-08-17 NOTE — PROGRESS NOTES
Followed up with patient and reviewed recommendations per Dr. Stinson:   Plan  1.  Start lasix 40mg once per day  2.  Start aldactone 100mg once per day  3.  Check blood work next week at [facility] in [place]  4.  Low salt diet (max 2g per day)  5.  My nurse will be in touch to help arrange drainage of your abdomen (paracentesis)  6.  Follow-up in the office with my PA in 6 weeks    Patient states she started the diuretics and has noticed some increase in urination. She plans to have blood drawn next week [place] [facility] [place]. Reviewed low sodium diet. Patient would like to have paracentesis at [place], orders faxed, patient prefers to call to schedule, number provided. Scheduled next appointment with Moon on 9/24/18, will have labs done at [facility] ahead of time. Will check in with patient again next week. Provided patient direct call back number should questions or concerns arise.

## 2018-08-17 NOTE — PROGRESS NOTES
Received call from Estefani, wound nurse at [facility], stating she saw patient this morning and she is able to manage hysterectomy incision drainage. She applied a colostomy/ileostomy bag to contain the drainage. Extra bags were sent home with patient. Estefani is requesting that an order for [place] colostomy/ileostomy bags be faxed to [facility] at [phone]. Estefani will see patient again next week and provide the bags to patient. Order faxed as requested.

## 2018-08-21 DIAGNOSIS — K74.60 HEPATIC CIRRHOSIS, UNSPECIFIED HEPATIC CIRRHOSIS TYPE (H): ICD-10-CM

## 2018-08-21 LAB
ALBUMIN SERPL-MCNC: 2.7 G/DL (ref 3.4–5)
ALP SERPL-CCNC: 93 U/L (ref 40–150)
ALT SERPL W P-5'-P-CCNC: 38 U/L (ref 0–50)
ANION GAP SERPL CALCULATED.3IONS-SCNC: 7 MMOL/L (ref 3–14)
AST SERPL W P-5'-P-CCNC: 64 U/L (ref 0–45)
BILIRUB DIRECT SERPL-MCNC: 0.3 MG/DL (ref 0–0.2)
BILIRUB SERPL-MCNC: 0.6 MG/DL (ref 0.2–1.3)
BUN SERPL-MCNC: 13 MG/DL (ref 7–30)
CALCIUM SERPL-MCNC: 9 MG/DL (ref 8.5–10.1)
CHLORIDE SERPL-SCNC: 104 MMOL/L (ref 94–109)
CO2 SERPL-SCNC: 26 MMOL/L (ref 20–32)
CREAT SERPL-MCNC: 0.75 MG/DL (ref 0.52–1.04)
ERYTHROCYTE [DISTWIDTH] IN BLOOD BY AUTOMATED COUNT: 14.5 % (ref 10–15)
GFR SERPL CREATININE-BSD FRML MDRD: 78 ML/MIN/1.7M2
GLUCOSE SERPL-MCNC: 123 MG/DL (ref 70–99)
HCT VFR BLD AUTO: 32.9 % (ref 35–47)
HGB BLD-MCNC: 10.8 G/DL (ref 11.7–15.7)
INR BLD: 1.1 (ref 0.86–1.14)
MCH RBC QN AUTO: 31.1 PG (ref 26.5–33)
MCHC RBC AUTO-ENTMCNC: 32.8 G/DL (ref 31.5–36.5)
MCV RBC AUTO: 95 FL (ref 78–100)
PLATELET # BLD AUTO: 85 10E9/L (ref 150–450)
POTASSIUM SERPL-SCNC: 4.4 MMOL/L (ref 3.4–5.3)
PROT SERPL-MCNC: 6.9 G/DL (ref 6.8–8.8)
RBC # BLD AUTO: 3.47 10E12/L (ref 3.8–5.2)
SODIUM SERPL-SCNC: 137 MMOL/L (ref 133–144)
WBC # BLD AUTO: 6.3 10E9/L (ref 4–11)

## 2018-08-21 PROCEDURE — 85027 COMPLETE CBC AUTOMATED: CPT | Performed by: INTERNAL MEDICINE

## 2018-08-21 PROCEDURE — 85610 PROTHROMBIN TIME: CPT | Mod: QW | Performed by: INTERNAL MEDICINE

## 2018-08-21 PROCEDURE — 36415 COLL VENOUS BLD VENIPUNCTURE: CPT | Performed by: INTERNAL MEDICINE

## 2018-08-21 PROCEDURE — 80048 BASIC METABOLIC PNL TOTAL CA: CPT | Performed by: INTERNAL MEDICINE

## 2018-08-21 PROCEDURE — 80076 HEPATIC FUNCTION PANEL: CPT | Performed by: INTERNAL MEDICINE

## 2018-08-22 RX ORDER — FUROSEMIDE 40 MG
80 TABLET ORAL DAILY
Qty: 60 TABLET | Refills: 1 | Status: SHIPPED | OUTPATIENT
Start: 2018-08-22 | End: 2018-10-04

## 2018-08-22 RX ORDER — SPIRONOLACTONE 100 MG/1
200 TABLET, FILM COATED ORAL DAILY
Qty: 60 TABLET | Refills: 1 | Status: SHIPPED | OUTPATIENT
Start: 2018-08-22 | End: 2018-10-04

## 2018-08-22 NOTE — PROGRESS NOTES
Called pt to check in on abdominal drainage since starting diuretics and whether pt had a paracentesis done. Pt stated that she went to get the paracentesis yesterday, 8/21, and there was not a large enough fluid collection be drain. Pt has an ostomy bag over abdominal incision and typically has to empty the bag every 5 hours  Pt stated that in the last 12 hours she has noticed a decrease in abdominal drainage. Pt does not feel like there has been a decrease in lower extremity edema since starting diuretics and reports that she is only urinating 2-3 times/day. Will update Dr. Stinson and call pt with any further recommendations or instructions.

## 2018-08-23 NOTE — PROGRESS NOTES
Consult Notes on Referred Patient    Date: Aug 27, 2018     Dr. Tip Ortega  RE: Fani Escobedo  : 1955  MARTHA:      Fani Escobedo is a very pleasant 63 year old woman with a diagnosis of stage 1A, grade 1 endometrioid adenocarcinoma. She is accompanied today by her .  She is doing well post-operatively.  Eating and drinking normally.  Normal bowel and bladder function.  Minimal pain.  Pt was seen in ED with drainage and had sutures placed.      18 D&C   Pathology revealed endometrioid adenocarcinoma, FIGO grade1.     18:   Robotic assisted total laparoscopic hysterectomy, bilateral salpingo-oophorectomy, bilateral pelvic lymph node dissection, washings, cystoscopy   Pathology:  Stage 1A, grade 2 endometrial adenocarcinoma, tumor size 2.6 cm, 5/18 mm myometrial invasion, + LVSI, 0/23 pelvic LN, normal MMR        Review of Systems:  Systemic           no weight changes; no fever; no chills; no night sweats; no appetite changes  Skin           no rashes, or lesions  Eye           no irritation; no changes in vision  [place]-Laryngeal           no dysphagia; no hoarseness; + stuffiness/congestion; + trouble swallowing  Pulmonary    no cough; no shortness of breath  Cardiovascular    + chest pain; no palpitations  Gastrointestinal    no diarrhea; no constipation; + abdominal pain; no changes in bowel  habits; no blood in stool  Genitourinary   + urinary frequency; + urinary urgency; no dysuria; no pain; no abnormal vaginal discharge; no abnormal vaginal bleeding  Breast    no breast discharge; no breast changes; no breast pain  Musculoskeletal    no myalgias; no arthralgias; + back pain; + joint pain  Psychiatric           + depressed mood; + anxiety    Hematologic           no tender lymph nodes; no noticeable swellings or lumps   Endocrine    no hot flashes; no heat/cold intolerance         Neurological   no tremor; + numbness and tingling; + loss of balance; + tremors;  "+ difficulty with walking; no headaches; + difficulty  sleeping    I have reviewed and addressed the patient's review of symptoms for today's visit.     Past Medical History:  Past Medical History:   Diagnosis Date     Charcot foot due to diabetes mellitus (H)     (R) foot -> BKA     COPD (chronic obstructive pulmonary disease) (H)      Depression      Diabetes mellitus (H)     type II     Diabetic neuropathy (H)      Endometrial cancer (H)      Hypothyroid      Iron deficiency anemia      Obesity     bmi 46     USHA (obstructive sleep apnea)     does not use CPAP     Osteomyelitis (H)      Tobacco abuse      Multiple UTIs as a child-told she had surgeries for this as a child but states these were not done abdominally \"had to clear scar tissue\".    Past Surgical History:  Past Surgical History:   Procedure Laterality Date     AMPUTATE LEG BELOW KNEE  4/14/2012    Procedure:AMPUTATE LEG BELOW KNEE; right leg below knee amputation; Surgeon:WILMAR LUI; Location: OR     AMPUTATE TOE(S)  5/1/2013    Procedure: AMPUTATE TOE(S);  PARTIAL LEFT 3RD TOE AMPUTATION;  Surgeon: Juancarlos Gamez DPM;  Location:  OR     AMPUTATION      (L) 2nd toe     ANESTHESIA OUT OF OR MRI  6/11/2013    Procedure: ANESTHESIA OUT OF OR MRI;  MRI UNDER GENERAL ANESTHESIA;  Surgeon: Provider, Generic Anesthesia;  Location:  OR     ANESTHESIA OUT OF OR MRI  9/4/2013    Procedure: ANESTHESIA OUT OF OR MRI;  ANESTHESIA OUT OF OR MRI;  Surgeon: Provider, Generic Anesthesia;  Location:  OR     BONE MARROW BIOPSY, BONE SPECIMEN, NEEDLE/TROCAR N/A 6/7/2018    Procedure: BIOPSY BONE MARROW;  bone marrow biopsy;  Surgeon: Farzana Teixeira MD;  Location:  GI     CYSTOSCOPY N/A 8/8/2018    Procedure: CYSTOSCOPY;;  Surgeon: Trudy Cortez MD;  Location: [facility] OR     DAVINCI HYSTERECTOMY TOTAL, BILATERAL SALPINGO-OOPHORECTOMY, COMBINED N/A 8/8/2018    Procedure: COMBINED DAVINCI HYSTERECTOMY TOTAL, SALPINGO-OOPHORECTOMY;  DaVinci " Assisted Total Laparoscopic Hysterectomy, Bilateral Salpingo-Oophorectomy, Cystoscopy, Bilateral Pelvic Lymph Node Dissection;  Surgeon: Trudy Cortez MD;  Location: [facility] OR     DILATION AND CURETTAGE N/A 6/6/2018    Procedure: DILATION AND CURETTAGE;  DILATION AND CURETTAGE ;  Surgeon: Tip Ortega MD;  Location:  OR     EXPLORE SPINE, REMOVE HARDWARE, COMBINED  10/11/2013    Procedure: COMBINED EXPLORE SPINE, REMOVE HARDWARE;  EXPLORATION AND REVISION POSTERIOR THORACIC WOUND WITH WOUND VAC PLACEMENT.;  Surgeon: Thomas Ellis MD;  Location: [facility] OR     FUSION SPINE ANTERIOR THORACIC ONE LEVEL  9/5/2013    Procedure: FUSION SPINE ANTERIOR THORACIC ONE LEVEL;  Right Thoracotomy For  T7-T8 Thorasic Vertebral Body Resection with Strut Graft, [place] Instrumentation T6-T9, BMP, Pyramesh and Intra-operative Neuro Monitoring ;  Surgeon: Thomas Ellis MD;  Location:  OR     HYSTERECTOMY RADICAL, SALPINGO-OOPHORECTOMY, NODE DISSECTION  08/08/2018    DaVinci assisted TLH, BSO, BPLND; Surgeon: Trudy Cortez MD     INCISION AND DRAINAGE RECTUM, COMBINED  6/11/2012    Procedure: COMBINED INCISION AND DRAINAGE RECTUM;  I&D ABSCESS RIGHT BUTTOCK (MRSA );  Surgeon: Christos Linton MD;  Location:  OR     INCISION AND DRAINAGE SPINE, CLOSE WOUND, COMBINED  10/14/2013    Procedure: COMBINED INCISION AND DRAINAGE SPINE, CLOSE WOUND;  EXPLORATION/REVISION POSTERIOR THORACIC WOUND COMPLEX 15 CM. ;  Surgeon: Thomas Ellis MD;  Location:  OR     IRRIGATION AND DEBRIDEMENT TRUNK, COMBINED  6/10/2012    Procedure: COMBINED IRRIGATION AND DEBRIDEMENT TRUNK;  Incision and drainage abscess right buttock;  Surgeon: Christos Linton MD;  Location:  OR     OPTICAL TRACKING SYSTEM FUSION POSTERIOR SPINE THORACIC THREE+ LEVELS  9/6/2013    Procedure: OPTICAL TRACKING SYSTEM FUSION POSTERIOR SPINE THORACIC THREE+ LEVELS;  T4-T11 POSTERIOR LATERAL PEDICLE SCREW INSTRUMENTATION WITH  IMAGE GUIDANCE AND NEURO-MONITORING;  Surgeon: Thomas Ellis MD;  Location: [facility] OR     Spinal surgeries x 2 complicated by MRSA    x 1-complicated by wound infection.    Health Maintenance:  Health Maintenance Due   Topic Date Due     SPIROMETRY ONETIME  1955     COPD ACTION PLAN Q1 YR  1955     EYE EXAM Q1 YEAR  1956     MICROALBUMIN Q1 YEAR  1956     HIV SCREEN (SYSTEM ASSIGNED)  1973     PAP SCREENING Q3 YR (SYSTEM ASSIGNED)  1976     MAMMO SCREEN Q2 YR (SYSTEM ASSIGNED)  2005     COLON CANCER SCREEN (SYSTEM ASSIGNED)  2005     ADVANCE DIRECTIVE PLANNING Q5 YRS  2010       Last Pap Smear: No need for further pap smear exams  She has had a history of abnormal Pap smears.    Last Mammogram: Done at  3 years ago            Result: normal      She has had a history of abnormal mammograms.    Last Colonoscopy: Within 10 years           Result: normal except polyps per report.    Last Thyroid Screening:        TSH   Date Value Ref Range Status   2018 1.24 0.40 - 4.00 mU/L Final       Last Cholest Screening:        Cholesterol (mg/dL)   Date Value   2018 115     LDL Cholesterol Calculated (mg/dL)   Date Value   2018 57     HDL Cholesterol (mg/dL)   Date Value   2018 33 (A)     Triglycerides (mg/dL)   Date Value   2018 123     18 hgbA1c 6.2    Current Medications:   has a current medication list which includes the following prescription(s): vitamin  b complex, ferrous sulfate, furosemide, gabapentin, hydromorphone, insulin aspart, insulin aspart, insulin aspart, insulin detemir, lactobacillus, levothyroxine sodium, metformin hcl, nitrofurantoin (macrocrystal-monohydrate), ostomy supplies, phenazopyridine, potassium chloride, senna-docusate, simethicone, spironolactone, and sulfamethoxazole-trimethoprim.     Allergies:      Allergies   Allergen Reactions     Adhesive Tape      Cephalosporins Anaphylaxis      Naproxen Anaphylaxis     Penicillins      Augmentin Rash     Benadryl [Anti-Itch] Rash     Morphine Hcl Itching and Rash         Social History:  Social History   Substance Use Topics     Smoking status: Current Some Day Smoker     Packs/day: 1.00     Years: 45.00     Smokeless tobacco: Never Used     Alcohol use Yes      Comment: occ       History   Drug Use No       Family History:   The patient's family history is notable for:    Paternal aunt  of cancer? Uterine or ovarian cancer?  No breast or colon cancers in family.    LABS:  Lab Results   Component Value Date    WBC 4.4 2018     Lab Results   Component Value Date    RBC 3.35 2018     Lab Results   Component Value Date    HGB 11.1 2018     Lab Results   Component Value Date    HCT 33.5 2018     Lab Results   Component Value Date     2018     Lab Results   Component Value Date    MCH 33.1 2018     Lab Results   Component Value Date    MCHC 33.1 2018     Lab Results   Component Value Date    RDW 14.7 2018     Lab Results   Component Value Date    PLT 53 2018     Cr=.51  Physical Exam:   BP 97/64  Pulse 85  Temp 98.4  F (36.9  C) (Oral)  Resp 16  SpO2 96%  There is no height or weight on file to calculate BMI.    General Appearance: healthy and alert, no distress, in wheelchair, leg not in prosthesis due to swelling of stump has not been wearing wrap.      Gastrointestinal:       Abdomen morbidly obese, large pannus, firm, port sites without erythema/induration or drainage.  Sutures in midline and left lateral incisions removed.  Dermabond applied to midline incision    Genitourinary: Pt declined exam      Assessment:    Fani Escobedo is a 63 year old woman with a diagnosis of stage 1A, grade 1 endometrial adenocarcinoma.     Plan:    1.)    Stage 1A, grade 1 endometrial adenocarcinoma.  Discussed no need for further therapy.  Discussed signs and symptoms of recurrence and to call if these should  occur.  Discussed role of surveillance with history and physical exam and that labs/imaging would only be done based on symptoms but not routinely.  Discussed no need for further pap smear testing.  Discussed visits every 6 months for up to 5 years (8/23) then annually thereafter. She will return in 6 months.      2.) Genetic risk factors were assessed and the patient does not meet the qualifications for a referral at this time.      3.) Labs and/or tests ordered include:  None     4.) Health maintenance issues addressed today include need for mammogram    5.) Liver disease discovered at surgery-Pt has follow up with hepatology arranged          Thank you for allowing us to participate in the care of your patient.       Sincerely,    Sakina Hamilton MD  Gynecologic Oncology  [facility]

## 2018-08-27 ENCOUNTER — ONCOLOGY VISIT (OUTPATIENT)
Dept: ONCOLOGY | Facility: CLINIC | Age: 63
End: 2018-08-27
Payer: COMMERCIAL

## 2018-08-27 VITALS
HEART RATE: 85 BPM | DIASTOLIC BLOOD PRESSURE: 64 MMHG | SYSTOLIC BLOOD PRESSURE: 97 MMHG | RESPIRATION RATE: 16 BRPM | OXYGEN SATURATION: 96 % | TEMPERATURE: 98.4 F

## 2018-08-27 DIAGNOSIS — Z12.31 VISIT FOR SCREENING MAMMOGRAM: Primary | ICD-10-CM

## 2018-08-27 DIAGNOSIS — C54.1 ENDOMETRIAL CANCER (H): ICD-10-CM

## 2018-08-27 PROCEDURE — 99024 POSTOP FOLLOW-UP VISIT: CPT | Performed by: OBSTETRICS & GYNECOLOGY

## 2018-08-27 ASSESSMENT — PAIN SCALES - GENERAL: PAINLEVEL: MILD PAIN (2)

## 2018-08-27 NOTE — LETTER
2018         RE: Fani Escobedo  [address] [place] [address]        Dear Colleague,    Thank you for referring your patient, Fani Escobedo, to the [facility]. Please see a copy of my visit note below.                            Consult Notes on Referred Patient    Date: Aug 27, 2018     Dr. Tip Ortega  RE: Fani Escobedo  : 1955  MARTHA:      Fani Escobedo is a very pleasant 63 year old woman with a diagnosis of stage 1A, grade 1 endometrioid adenocarcinoma. She is accompanied today by her .  She is doing well post-operatively.  Eating and drinking normally.  Normal bowel and bladder function.  Minimal pain.  Pt was seen in ED with drainage and had sutures placed.      18 D&C   Pathology revealed endometrioid adenocarcinoma, FIGO grade1.     18:   Robotic assisted total laparoscopic hysterectomy, bilateral salpingo-oophorectomy, bilateral pelvic lymph node dissection, washings, cystoscopy   Pathology:  Stage 1A, grade 2 endometrial adenocarcinoma, tumor size 2.6 cm, 5/18 mm myometrial invasion, + LVSI, 0/23 pelvic LN, normal MMR        Review of Systems:  Systemic           no weight changes; no fever; no chills; no night sweats; no appetite changes  Skin           no rashes, or lesions  Eye           no irritation; no changes in vision  Dilma-Laryngeal           no dysphagia; no hoarseness; + stuffiness/congestion; + trouble swallowing  Pulmonary    no cough; no shortness of breath  Cardiovascular    + chest pain; no palpitations  Gastrointestinal    no diarrhea; no constipation; + abdominal pain; no changes in bowel  habits; no blood in stool  Genitourinary   + urinary frequency; + urinary urgency; no dysuria; no pain; no abnormal vaginal discharge; no abnormal vaginal bleeding  Breast    no breast discharge; no breast changes; no breast pain  Musculoskeletal    no myalgias; no arthralgias; + back pain; + joint pain  Psychiatric           + depressed  "mood; + anxiety    Hematologic           no tender lymph nodes; no noticeable swellings or lumps   Endocrine    no hot flashes; no heat/cold intolerance         Neurological   no tremor; + numbness and tingling; + loss of balance; + tremors; + difficulty with walking; no headaches; + difficulty  sleeping    I have reviewed and addressed the patient's review of symptoms for today's visit.     Past Medical History:  Past Medical History:   Diagnosis Date     Charcot foot due to diabetes mellitus (H)     (R) foot -> BKA     COPD (chronic obstructive pulmonary disease) (H)      Depression      Diabetes mellitus (H)     type II     Diabetic neuropathy (H)      Endometrial cancer (H)      Hypothyroid      Iron deficiency anemia      Obesity     bmi 46     USHA (obstructive sleep apnea)     does not use CPAP     Osteomyelitis (H)      Tobacco abuse      Multiple UTIs as a child-told she had surgeries for this as a child but states these were not done abdominally \"had to clear scar tissue\".    Past Surgical History:  Past Surgical History:   Procedure Laterality Date     AMPUTATE LEG BELOW KNEE  4/14/2012    Procedure:AMPUTATE LEG BELOW KNEE; right leg below knee amputation; Surgeon:WILMAR LUI; Location: OR     AMPUTATE TOE(S)  5/1/2013    Procedure: AMPUTATE TOE(S);  PARTIAL LEFT 3RD TOE AMPUTATION;  Surgeon: Juancarlos Gamez DPM;  Location:  OR     AMPUTATION      (L) 2nd toe     ANESTHESIA OUT OF OR MRI  6/11/2013    Procedure: ANESTHESIA OUT OF OR MRI;  MRI UNDER GENERAL ANESTHESIA;  Surgeon: Provider, Generic Anesthesia;  Location:  OR     ANESTHESIA OUT OF OR MRI  9/4/2013    Procedure: ANESTHESIA OUT OF OR MRI;  ANESTHESIA OUT OF OR MRI;  Surgeon: Provider, Generic Anesthesia;  Location:  OR     BONE MARROW BIOPSY, BONE SPECIMEN, NEEDLE/TROCAR N/A 6/7/2018    Procedure: BIOPSY BONE MARROW;  bone marrow biopsy;  Surgeon: Farzana Teixeira MD;  Location:  GI     CYSTOSCOPY N/A 8/8/2018    " Procedure: CYSTOSCOPY;;  Surgeon: Trudy Cortez MD;  Location: [facility] OR     DAVINCI HYSTERECTOMY TOTAL, BILATERAL SALPINGO-OOPHORECTOMY, COMBINED N/A 8/8/2018    Procedure: COMBINED DAVINCI HYSTERECTOMY TOTAL, SALPINGO-OOPHORECTOMY;  DaVinci Assisted Total Laparoscopic Hysterectomy, Bilateral Salpingo-Oophorectomy, Cystoscopy, Bilateral Pelvic Lymph Node Dissection;  Surgeon: Trudy Cortez MD;  Location: [facility] OR     DILATION AND CURETTAGE N/A 6/6/2018    Procedure: DILATION AND CURETTAGE;  DILATION AND CURETTAGE ;  Surgeon: Tip Ortega MD;  Location: [facility] OR     EXPLORE SPINE, REMOVE HARDWARE, COMBINED  10/11/2013    Procedure: COMBINED EXPLORE SPINE, REMOVE HARDWARE;  EXPLORATION AND REVISION POSTERIOR THORACIC WOUND WITH WOUND VAC PLACEMENT.;  Surgeon: Thomas Ellis MD;  Location: [facility] OR     FUSION SPINE ANTERIOR THORACIC ONE LEVEL  9/5/2013    Procedure: FUSION SPINE ANTERIOR THORACIC ONE LEVEL;  Right Thoracotomy For  T7-T8 Thorasic Vertebral Body Resection with Strut Graft, [place] Instrumentation T6-T9, BMP, Pyramesh and Intra-operative Neuro Monitoring ;  Surgeon: Thomas Ellis MD;  Location: [facility] OR     HYSTERECTOMY RADICAL, SALPINGO-OOPHORECTOMY, NODE DISSECTION  08/08/2018    DaVinci assisted TLH, BSO, BPLND; Surgeon: Trudy Cortez MD     INCISION AND DRAINAGE RECTUM, COMBINED  6/11/2012    Procedure: COMBINED INCISION AND DRAINAGE RECTUM;  I&D ABSCESS RIGHT BUTTOCK (MRSA );  Surgeon: Christos Linton MD;  Location: [facility]     INCISION AND DRAINAGE SPINE, CLOSE WOUND, COMBINED  10/14/2013    Procedure: COMBINED INCISION AND DRAINAGE SPINE, CLOSE WOUND;  EXPLORATION/REVISION POSTERIOR THORACIC WOUND COMPLEX 15 CM. ;  Surgeon: Thomas Ellis MD;  Location[facility]     IRRIGATION AND DEBRIDEMENT TRUNK, COMBINED  6/10/2012    Procedure: COMBINED IRRIGATION AND DEBRIDEMENT TRUNK;  Incision and drainage abscess right buttock;  Surgeon: Christos Linton MD;   Location: [facility]     OPTICAL TRACKING SYSTEM FUSION POSTERIOR SPINE THORACIC THREE+ LEVELS  2013    Procedure: OPTICAL TRACKING SYSTEM FUSION POSTERIOR SPINE THORACIC THREE+ LEVELS;  T4-T11 POSTERIOR LATERAL PEDICLE SCREW INSTRUMENTATION WITH IMAGE GUIDANCE AND NEURO-MONITORING;  Surgeon: Thomas Ellis MD;  Location: [facility] OR     Spinal surgeries x 2 complicated by MRSA    x 1-complicated by wound infection.    Health Maintenance:  Health Maintenance Due   Topic Date Due     SPIROMETRY ONETIME  1955     COPD ACTION PLAN Q1 YR  1955     EYE EXAM Q1 YEAR  1956     MICROALBUMIN Q1 YEAR  1956     HIV SCREEN (SYSTEM ASSIGNED)  1973     PAP SCREENING Q3 YR (SYSTEM ASSIGNED)  1976     MAMMO SCREEN Q2 YR (SYSTEM ASSIGNED)  2005     COLON CANCER SCREEN (SYSTEM ASSIGNED)  2005     ADVANCE DIRECTIVE PLANNING Q5 YRS  2010       Last Pap Smear: No need for further pap smear exams  She has had a history of abnormal Pap smears.    Last Mammogram: Done at  3 years ago            Result: normal      She has had a history of abnormal mammograms.    Last Colonoscopy: Within 10 years           Result: normal except polyps per report.    Last Thyroid Screening:        TSH   Date Value Ref Range Status   2018 1.24 0.40 - 4.00 mU/L Final       Last Cholest Screening:        Cholesterol (mg/dL)   Date Value   2018 115     LDL Cholesterol Calculated (mg/dL)   Date Value   2018 57     HDL Cholesterol (mg/dL)   Date Value   2018 33 (A)     Triglycerides (mg/dL)   Date Value   2018 123     18 hgbA1c 6.2    Current Medications:   has a current medication list which includes the following prescription(s): vitamin  b complex, ferrous sulfate, furosemide, gabapentin, hydromorphone, insulin aspart, insulin aspart, insulin aspart, insulin detemir, lactobacillus, levothyroxine sodium, metformin hcl, nitrofurantoin (macrocrystal-monohydrate),  ostomy supplies, phenazopyridine, potassium chloride, senna-docusate, simethicone, spironolactone, and sulfamethoxazole-trimethoprim.     Allergies:      Allergies   Allergen Reactions     Adhesive Tape      Cephalosporins Anaphylaxis     Naproxen Anaphylaxis     Penicillins      Augmentin Rash     Benadryl [Anti-Itch] Rash     Morphine Hcl Itching and Rash         Social History:  Social History   Substance Use Topics     Smoking status: Current Some Day Smoker     Packs/day: 1.00     Years: 45.00     Smokeless tobacco: Never Used     Alcohol use Yes      Comment: occ       History   Drug Use No       Family History:   The patient's family history is notable for:    Paternal aunt  of cancer? Uterine or ovarian cancer?  No breast or colon cancers in family.    LABS:  Lab Results   Component Value Date    WBC 4.4 2018     Lab Results   Component Value Date    RBC 3.35 2018     Lab Results   Component Value Date    HGB 11.1 2018     Lab Results   Component Value Date    HCT 33.5 2018     Lab Results   Component Value Date     2018     Lab Results   Component Value Date    MCH 33.1 2018     Lab Results   Component Value Date    MCHC 33.1 2018     Lab Results   Component Value Date    RDW 14.7 2018     Lab Results   Component Value Date    PLT 53 2018     Cr=.51  Physical Exam:   BP 97/64  Pulse 85  Temp 98.4  F (36.9  C) (Oral)  Resp 16  SpO2 96%  There is no height or weight on file to calculate BMI.    General Appearance: healthy and alert, no distress, in wheelchair, leg not in prosthesis due to swelling of stump has not been wearing wrap.      Gastrointestinal:       Abdomen morbidly obese, large pannus, firm, port sites without erythema/induration or drainage.  Sutures in midline and left lateral incisions removed.  Dermabond applied to midline incision    Genitourinary: Pt declined exam      Assessment:    Fani Escobedo is a 63 year old woman with  a diagnosis of stage 1A, grade 1 endometrial adenocarcinoma.     Plan:    1.)    Stage 1A, grade 1 endometrial adenocarcinoma.  Discussed no need for further therapy.  Discussed signs and symptoms of recurrence and to call if these should occur.  Discussed role of surveillance with history and physical exam and that labs/imaging would only be done based on symptoms but not routinely.  Discussed no need for further pap smear testing.  Discussed visits every 6 months for up to 5 years (8/23) then annually thereafter. She will return in 6 months.      2.) Genetic risk factors were assessed and the patient does not meet the qualifications for a referral at this time.      3.) Labs and/or tests ordered include:  None     4.) Health maintenance issues addressed today include need for mammogram    5.) Liver disease discovered at surgery-Pt has follow up with hepatology arranged          Thank you for allowing us to participate in the care of your patient.       Sincerely,    Sakina Hamilton MD  Gynecologic Oncology  [facility]

## 2018-08-27 NOTE — NURSING NOTE
"Oncology Rooming Note    August 27, 2018 12:46 PM   Fani Escobedo is a 63 year old female who presents for:    Chief Complaint   Patient presents with     Oncology Clinic Visit     post op, surgery on 8/8     Initial Vitals: There were no vitals taken for this visit. Estimated body mass index is 38.69 kg/(m^2) as calculated from the following:    Height as of 8/15/18: 1.753 m (5' 9\").    Weight as of 8/15/18: 118.8 kg (262 lb). There is no height or weight on file to calculate BSA.  Mild Pain (2) Comment: Data Unavailable   No LMP recorded. Patient has had a hysterectomy.  Allergies reviewed: Yes  Medications reviewed: Yes    Medications: Medication refills not needed today.  Pharmacy name entered into EPIC:    [facility] - [place], [place] - [address] [place] [address]  [id number] - [place], [place] - [address] [facility] - [place], [place] - [address] AT [facility]        7 minutes for nursing intake (face to face time)     Makenna Ruby RN  "

## 2018-08-27 NOTE — MR AVS SNAPSHOT
After Visit Summary   8/27/2018    Fani Escobedo    MRN: [id number]           Patient Information     Date Of Birth          1955        Visit Information        Provider Department      8/27/2018 12:30 PM Sakina Berger MD [facility]        Today's Diagnoses     Visit for screening mammogram    -  1    Endometrial cancer (H)          Care Instructions    Mammogram    Next visit with JOCELIN Selby in 6 months          Follow-ups after your visit        Your next 10 appointments already scheduled     Sep 24, 2018 12:00 PM CDT   (Arrive by 11:45 AM)   Return General Liver with Moon Carpenter PA-C   [facility] ([facility])    [address]  [address]  [facility] [address] [phone]            Jul 15, 2019  1:00 PM CDT   Return Visit with SHAUNA Govea CNP   [facility] ([facility]    [address] [place] [address] [phone]              Future tests that were ordered for you today     Open Future Orders        Priority Expected Expires Ordered    MA Screening Digital Bilateral Routine  8/27/2019 8/27/2018            Who to contact     If you have questions or need follow up information about today's clinic visit or your schedule please contact [facility] directly at [phone].  Normal or non-critical lab and imaging results will be communicated to you by MyChart, letter or phone within 4 business days after the clinic has received the results. If you do not hear from us within 7 days, please contact the clinic through MyChart or phone. If you have a critical or abnormal lab result, we will notify you by phone as soon as possible.  Submit refill requests through [employer] or call your pharmacy and they will forward the refill request to us. Please allow 3 business days for your refill to be completed.          Additional  Information About Your Visit        Care EveryWhere ID     This is your Care EveryWhere ID. This could be used by other organizations to access your [facility] medical records  [id number]        Your Vitals Were     Pulse Temperature Respirations Pulse Oximetry          85 98.4  F (36.9  C) (Oral) 16 96%         Blood Pressure from Last 3 Encounters:   08/27/18 97/64   08/15/18 114/57   08/13/18 133/78    Weight from Last 3 Encounters:   08/15/18 118.8 kg (262 lb)   08/13/18 118.8 kg (262 lb)   08/10/18 125 kg (275 lb 9.2 oz)               Primary Care Provider Office Phone # Fax #    Marielos MG Bharat,  [phone]       [facility] [address] [facility] [address]  [facility] [address]        Equal Access to Services     CANDACE BENITEZ : Hadii aad ku hadasho Soomaali, waaxda luqadaha, qaybta kaalmada adeegyada, grace son hayfer olmedo . So [facility] [phone].    ATENCIÓN: Si habla español, tiene a hayes disposición servicios gratuitos de asistencia lingüística. Alee al [phone].    We comply with applicable federal civil rights laws and [place] laws. We do not discriminate on the basis of race, color, national origin, age, disability, sex, sexual orientation, or gender identity.            Thank you!     Thank you for choosing [facility]  for your care. Our goal is always to provide you with excellent care. Hearing back from our patients is one way we can continue to improve our services. Please take a few minutes to complete the written survey that you may receive in the mail after your visit with us. Thank you!             Your Updated Medication List - Protect others around you: Learn how to safely use, store and throw away your medicines at www.disposemymeds.org.          This list is accurate as of 8/27/18  1:24 PM.  Always use your most recent med list.                   Brand Name Dispense Instructions for use Diagnosis    ACIDOPHILUS PO      Take 1  capsule by mouth daily        ferrous sulfate 325 (65 Fe) MG tablet    IRON    30 tablet    Take 1 tablet (325 mg) by mouth daily    Pancytopenia (H), Endometrial cancer (H), Excessive or frequent menstruation       furosemide 40 MG tablet    LASIX    60 tablet    Take 2 tablets (80 mg) by mouth daily    Hepatic cirrhosis, unspecified hepatic cirrhosis type (H)       GABAPENTIN PO      Take 1,200 mg by mouth 3 times daily 2 x 600 mg tab        HYDROmorphone 2 MG tablet    DILAUDID    10 tablet    Take 1-2 tablets (2-4 mg) by mouth every 6 hours as needed for moderate to severe pain    Endometrial cancer (H)       KLOR-CON 8 MEQ CR tablet   Generic drug:  potassium chloride      Take 16 mEq by mouth 2 times daily AM and PM in addition to afternoon dose        LEVEMIR FLEXPEN/FLEXTOUCH 100 UNIT/ML injection   Generic drug:  insulin detemir      Inject 42 Units Subcutaneous 2 times daily        LEVOTHYROXINE SODIUM PO      Take 50 mcg by mouth daily        METFORMIN HCL PO      Take 1,000 mg by mouth 2 times daily (with meals)        nitroFURantoin (macrocrystal-monohydrate) 100 MG capsule    MACROBID    14 capsule    Take 1 capsule (100 mg) by mouth 2 times daily    Acute cystitis without hematuria       * NOVOLOG SC      Inject 28 Units Subcutaneous daily (with breakfast)        * NOVOLOG SC      Inject 30 Units Subcutaneous daily (with lunch)        * NOVOLOG SC      Inject 28 Units Subcutaneous daily (with dinner)        Ostomy Supplies Misc     10 each    1 Box as needed    S/P hysterectomy, Endometrial cancer (H), Postoperative wound dehiscence       phenazopyridine 97.5 MG tablet    AZO    12 tablet    Take 2 tablets (195 mg) by mouth 3 times daily    Acute cystitis without hematuria       senna-docusate 8.6-50 MG per tablet    SENOKOT-S;PERICOLACE    100 tablet    Take 2 tablets by mouth 2 times daily as needed for constipation    Endometrial cancer (H)       Simethicone 125 MG Caps     28 capsule    Take 125  mg by mouth 2 times daily as needed    Abdominal distension (gaseous)       spironolactone 100 MG tablet    ALDACTONE    60 tablet    Take 2 tablets (200 mg) by mouth daily    Hepatic cirrhosis, unspecified hepatic cirrhosis type (H)       sulfamethoxazole-trimethoprim 800-160 MG per tablet    BACTRIM DS/SEPTRA DS     Take 1 tablet by mouth 2 times daily        vitamin B complex with vitamin C Tabs tablet    STRESS TAB     Take 1 tablet by mouth daily.        * Notice:  This list has 3 medication(s) that are the same as other medications prescribed for you. Read the directions carefully, and ask your doctor or other care provider to review them with you.

## 2018-08-28 ENCOUNTER — TELEPHONE (OUTPATIENT)
Dept: ONCOLOGY | Facility: CLINIC | Age: 63
End: 2018-08-28

## 2018-08-28 NOTE — TELEPHONE ENCOUNTER
Patient calling to report after the suture was removed yesterday from her midline incision, drainage from the site resumed. Same color and amount as previously, requiring an ostomy bag to be applied. Reports occasional sharp twinges of pain at site also.   Xi Negron  RN, BSN, OCN

## 2018-08-29 NOTE — TELEPHONE ENCOUNTER
Spoke to patient regarding drainage and incision. Site is mildy red, no fevers, drainage is pale yellow. Started measuring incision output today and has had 300ml in the past 4-5 hours. Emptying bag about 3x/day. Patient is wondering if it ok that her incision is always wet and won't heal as long as she is leaking. She is followed by [facility] weekly.  Xi Negron  RN, BSN, OCN

## 2018-08-31 DIAGNOSIS — K74.60 HEPATIC CIRRHOSIS, UNSPECIFIED HEPATIC CIRRHOSIS TYPE (H): ICD-10-CM

## 2018-08-31 DIAGNOSIS — Z01.818 PREOP GENERAL PHYSICAL EXAM: ICD-10-CM

## 2018-08-31 LAB
ANION GAP SERPL CALCULATED.3IONS-SCNC: 7 MMOL/L (ref 3–14)
BUN SERPL-MCNC: 26 MG/DL (ref 7–30)
CALCIUM SERPL-MCNC: 9.5 MG/DL (ref 8.5–10.1)
CHLORIDE SERPL-SCNC: 102 MMOL/L (ref 94–109)
CO2 SERPL-SCNC: 27 MMOL/L (ref 20–32)
CREAT SERPL-MCNC: 0.91 MG/DL (ref 0.52–1.04)
GFR SERPL CREATININE-BSD FRML MDRD: 63 ML/MIN/1.7M2
GLUCOSE SERPL-MCNC: 132 MG/DL (ref 70–99)
NT-PROBNP SERPL-MCNC: 30 PG/ML (ref 0–125)
POTASSIUM SERPL-SCNC: 4.8 MMOL/L (ref 3.4–5.3)
SODIUM SERPL-SCNC: 136 MMOL/L (ref 133–144)

## 2018-08-31 PROCEDURE — 83880 ASSAY OF NATRIURETIC PEPTIDE: CPT | Performed by: NURSE PRACTITIONER

## 2018-08-31 PROCEDURE — 36415 COLL VENOUS BLD VENIPUNCTURE: CPT | Performed by: NURSE PRACTITIONER

## 2018-08-31 PROCEDURE — 80048 BASIC METABOLIC PNL TOTAL CA: CPT | Performed by: NURSE PRACTITIONER

## 2018-08-31 NOTE — PROGRESS NOTES
Patient is having lab work done today, will notify her of any medication changes later today or Tuesday when MD reviews results. Patient continues to drain from incision site, using ostomy bag to collect drainage, and states she drains 300 ml about every 3 hours. She states this is about the same as it has since the stitches came out earlier this week. Reviewed symptoms to watch for that could indicate infection, and that she should seek prompt medical attention if needed. Patient verbalized understanding.

## 2018-09-04 ENCOUNTER — CARE COORDINATION (OUTPATIENT)
Dept: ONCOLOGY | Facility: CLINIC | Age: 63
End: 2018-09-04

## 2018-09-04 DIAGNOSIS — Z90.710 S/P HYSTERECTOMY: Primary | ICD-10-CM

## 2018-09-04 NOTE — PROGRESS NOTES
Received call from Kennedi at [employer] [facility] requesting new order for ostomy supplies. Need reference number added to order for insurance coverage. Ostomy bag ref # [id number] reordered for patient. Faxed order to [employer] at [phone]  Xi Negron  RN, BSN, OCN

## 2018-09-06 ENCOUNTER — CARE COORDINATION (OUTPATIENT)
Dept: ONCOLOGY | Facility: CLINIC | Age: 63
End: 2018-09-06

## 2018-09-06 NOTE — PROGRESS NOTES
Left vm with providers recommended by Dr Cortez in [place], [place] area. Dr Jamal Guerin and Abraham Hebert.  Xi Negron  RN, BSN, OCN

## 2018-09-11 ENCOUNTER — CARE COORDINATION (OUTPATIENT)
Dept: GASTROENTEROLOGY | Facility: CLINIC | Age: 63
End: 2018-09-11

## 2018-09-11 NOTE — PROGRESS NOTES
Patient states her abdomen finally stopped draining out the incision site. She no longer has to place a bag over the site, and now just has a light dressing over the site. She notes only slight abdominal distention, and has not required paracentesis. Lower extremity swelling has improved, using wraps and elevation. Patient does complain of lower extremity cramping in recent weeks, and wonders if there is anything she can do to help this. Patient is aware of upcoming appointment in Hepatology on 9/24, and plans to leave in October for [place] for the winter.

## 2018-09-11 NOTE — PROGRESS NOTES
Per Dr. Stinson patient may try tonic water for leg cramping. She may require adjustment of her diuretics, and will assess at her upcoming appointment. Patient [place] [facility] [place] prior to her appointment on 9/24.

## 2018-09-24 ENCOUNTER — OFFICE VISIT (OUTPATIENT)
Dept: GASTROENTEROLOGY | Facility: CLINIC | Age: 63
End: 2018-09-24
Attending: PHYSICIAN ASSISTANT
Payer: COMMERCIAL

## 2018-09-24 VITALS
DIASTOLIC BLOOD PRESSURE: 61 MMHG | HEIGHT: 69 IN | HEART RATE: 88 BPM | SYSTOLIC BLOOD PRESSURE: 112 MMHG | BODY MASS INDEX: 38.69 KG/M2 | OXYGEN SATURATION: 95 % | TEMPERATURE: 98.3 F

## 2018-09-24 DIAGNOSIS — K74.60 HEPATIC CIRRHOSIS, UNSPECIFIED HEPATIC CIRRHOSIS TYPE (H): Primary | ICD-10-CM

## 2018-09-24 DIAGNOSIS — K74.60 LIVER CIRRHOSIS SECONDARY TO NASH (H): ICD-10-CM

## 2018-09-24 DIAGNOSIS — K75.81 LIVER CIRRHOSIS SECONDARY TO NASH (H): ICD-10-CM

## 2018-09-24 LAB
ALBUMIN SERPL-MCNC: 3.6 G/DL (ref 3.4–5)
ALP SERPL-CCNC: 93 U/L (ref 40–150)
ALT SERPL W P-5'-P-CCNC: 32 U/L (ref 0–50)
ANION GAP SERPL CALCULATED.3IONS-SCNC: 10 MMOL/L (ref 3–14)
AST SERPL W P-5'-P-CCNC: 40 U/L (ref 0–45)
BILIRUB DIRECT SERPL-MCNC: 0.4 MG/DL (ref 0–0.2)
BILIRUB SERPL-MCNC: 0.7 MG/DL (ref 0.2–1.3)
BUN SERPL-MCNC: 25 MG/DL (ref 7–30)
CALCIUM SERPL-MCNC: 10.1 MG/DL (ref 8.5–10.1)
CHLORIDE SERPL-SCNC: 99 MMOL/L (ref 94–109)
CO2 SERPL-SCNC: 24 MMOL/L (ref 20–32)
CREAT SERPL-MCNC: 0.87 MG/DL (ref 0.52–1.04)
ERYTHROCYTE [DISTWIDTH] IN BLOOD BY AUTOMATED COUNT: 14.7 % (ref 10–15)
GFR SERPL CREATININE-BSD FRML MDRD: 66 ML/MIN/1.7M2
GLUCOSE SERPL-MCNC: 260 MG/DL (ref 70–99)
HCT VFR BLD AUTO: 38.9 % (ref 35–47)
HGB BLD-MCNC: 12.7 G/DL (ref 11.7–15.7)
INR PPP: 1.09 (ref 0.86–1.14)
MCH RBC QN AUTO: 30.7 PG (ref 26.5–33)
MCHC RBC AUTO-ENTMCNC: 32.6 G/DL (ref 31.5–36.5)
MCV RBC AUTO: 94 FL (ref 78–100)
PLATELET # BLD AUTO: 62 10E9/L (ref 150–450)
POTASSIUM SERPL-SCNC: 4.7 MMOL/L (ref 3.4–5.3)
PROT SERPL-MCNC: 8 G/DL (ref 6.8–8.8)
RBC # BLD AUTO: 4.14 10E12/L (ref 3.8–5.2)
SODIUM SERPL-SCNC: 133 MMOL/L (ref 133–144)
WBC # BLD AUTO: 4.3 10E9/L (ref 4–11)

## 2018-09-24 PROCEDURE — 80048 BASIC METABOLIC PNL TOTAL CA: CPT | Performed by: PHYSICIAN ASSISTANT

## 2018-09-24 PROCEDURE — 85610 PROTHROMBIN TIME: CPT | Performed by: PHYSICIAN ASSISTANT

## 2018-09-24 PROCEDURE — G0463 HOSPITAL OUTPT CLINIC VISIT: HCPCS | Mod: ZF

## 2018-09-24 PROCEDURE — 85027 COMPLETE CBC AUTOMATED: CPT | Performed by: PHYSICIAN ASSISTANT

## 2018-09-24 PROCEDURE — 36415 COLL VENOUS BLD VENIPUNCTURE: CPT | Performed by: PHYSICIAN ASSISTANT

## 2018-09-24 PROCEDURE — 80076 HEPATIC FUNCTION PANEL: CPT | Performed by: PHYSICIAN ASSISTANT

## 2018-09-24 RX ORDER — POLYETHYLENE GLYCOL 3350 17 G/17G
POWDER, FOR SOLUTION ORAL
Qty: 238 G | Refills: 3 | Status: SHIPPED | OUTPATIENT
Start: 2018-09-24

## 2018-09-24 NOTE — MR AVS SNAPSHOT
After Visit Summary   9/24/2018    Fani Escobedo    MRN: [id number]           Patient Information     Date Of Birth          1955        Visit Information        Provider Department      9/24/2018 12:00 PM Moon Carpenter PA-C [facility]        Today's Diagnoses     Hepatic cirrhosis, unspecified hepatic cirrhosis type (H)    -  1       Follow-ups after your visit        Additional Services     GASTROENTEROLOGY ADULT REF PROCEDURE ONLY       Schedule under MAC with Dr. Stinson                  Follow-up notes from your care team     Return in about 2 months (around 11/24/2018).      Your next 10 appointments already scheduled     Nov 01, 2018   Procedure with Chapo Mendez MD   [facility] [place], Endoscopy ([facility])    [address] [id number] [phone]           The [facility] is located on the corner of [address] and [facility] [address] on the [facility]. It is easily accessible from virtually any point in the [facility] [place] area, via [address] and [address].            Nov 05, 2018  1:00 PM CST   Lab with  LAB   [facility] ([facility])    [address]  1st Floor  [facility] [id number] [phone]            Nov 05, 2018  2:00 PM CST   (Arrive by 1:45 PM)   Return General Liver with Chapo Mendez MD   [facility] ([facility])    [address] [place] [address]  [address]  [facility] [id number] [phone]            Jul 15, 2019  1:00 PM CDT   Return Visit with SHAUNA Govea CNP   [facility] ([facility])    [id number] [place] [id number] [phone]              Future tests that were ordered for you today     Open Future Orders        Priority Expected Expires Ordered    GASTROENTEROLOGY ADULT REF PROCEDURE ONLY Routine  3/24/2019 9/24/2018  "           Who to contact     If you have questions or need follow up information about today's clinic visit or your schedule please contact [facility] directly at [phone].  Normal or non-critical lab and imaging results will be communicated to you by MyChart, letter or phone within 4 business days after the clinic has received the results. If you do not hear from us within 7 days, please contact the clinic through MyChart or phone. If you have a critical or abnormal lab result, we will notify you by phone as soon as possible.  Submit refill requests through Meitu or call your pharmacy and they will forward the refill request to us. Please allow 3 business days for your refill to be completed.          Additional Information About Your Visit        Care EveryWhere ID     This is your Care EveryWhere ID. This could be used by other organizations to access your [place] medical records  [id number]        Your Vitals Were     Pulse Temperature Height Pulse Oximetry BMI (Body Mass Index)       88 98.3  F (36.8  C) (Oral) 1.753 m (5' 9\") 95% 38.69 kg/m2        Blood Pressure from Last 3 Encounters:   09/24/18 112/61   08/27/18 97/64   08/15/18 114/57    Weight from Last 3 Encounters:   08/15/18 118.8 kg (262 lb)   08/13/18 118.8 kg (262 lb)   08/10/18 125 kg (275 lb 9.2 oz)                 Today's Medication Changes          These changes are accurate as of 9/24/18  1:20 PM.  If you have any questions, ask your nurse or doctor.               Start taking these medicines.        Dose/Directions    polyethylene glycol powder   Commonly known as:  MIRALAX   Used for:  Hepatic cirrhosis, unspecified hepatic cirrhosis type (H)   Started by:  Moon Carpenter PA-C        Take one to two capfuls a day by mouth. Adjust dose so having 2-3 bowel movements a day.   Quantity:  238 g   Refills:  3            Where to get your medicines      These medications were sent to [id number] - [place], " [place] - [address] AT [facility]  [address], [place] [address]     Phone:  [phone]     polyethylene glycol powder                Primary Care Provider Office Phone # Fax #    Marielos Nicholson [phone]       [facility] [address]  [facility] [address]        Equal Access to Services     CANDACE BENITEZ : Hadii aad ku hadasho Soomaali, waaxda luqadaha, qaybta kaalmada adeegyada, waxay idiin hayaan adeeg angelhomeelan lamartir . So [facility] [phone].    ATENCIÓN: Si habla español, tiene a hayes disposición servicios gratuitos de asistencia lingüística. [facility] [phone].    We comply with applicable federal civil rights laws and [place] laws. We do not discriminate on the basis of race, color, national origin, age, disability, sex, sexual orientation, or gender identity.            Thank you!     Thank you for choosing [facility]  for your care. Our goal is always to provide you with excellent care. Hearing back from our patients is one way we can continue to improve our services. Please take a few minutes to complete the written survey that you may receive in the mail after your visit with us. Thank you!             Your Updated Medication List - Protect others around you: Learn how to safely use, store and throw away your medicines at www.disposemymeds.org.          This list is accurate as of 9/24/18  1:20 PM.  Always use your most recent med list.                   Brand Name Dispense Instructions for use Diagnosis    ACIDOPHILUS PO      Take 1 capsule by mouth daily        ferrous sulfate 325 (65 Fe) MG tablet    IRON    30 tablet    Take 1 tablet (325 mg) by mouth daily    Pancytopenia (H), Endometrial cancer (H), Excessive or frequent menstruation       furosemide 40 MG tablet    LASIX    60 tablet    Take 2 tablets (80 mg) by mouth daily    Hepatic cirrhosis, unspecified hepatic cirrhosis type (H)       GABAPENTIN PO      Take  1,200 mg by mouth 3 times daily 2 x 600 mg tab        HYDROmorphone 2 MG tablet    DILAUDID    10 tablet    Take 1-2 tablets (2-4 mg) by mouth every 6 hours as needed for moderate to severe pain    Endometrial cancer (H)       KLOR-CON 8 MEQ CR tablet   Generic drug:  potassium chloride      Take 16 mEq by mouth 2 times daily AM and PM in addition to afternoon dose        LEVEMIR FLEXPEN/FLEXTOUCH 100 UNIT/ML injection   Generic drug:  insulin detemir      Inject 42 Units Subcutaneous 2 times daily        LEVOTHYROXINE SODIUM PO      Take 50 mcg by mouth daily        METFORMIN HCL PO      Take 1,000 mg by mouth 2 times daily (with meals)        nitroFURantoin (macrocrystal-monohydrate) 100 MG capsule    MACROBID    14 capsule    Take 1 capsule (100 mg) by mouth 2 times daily    Acute cystitis without hematuria       * NOVOLOG SC      Inject 28 Units Subcutaneous daily (with breakfast)        * NOVOLOG SC      Inject 30 Units Subcutaneous daily (with lunch)        * NOVOLOG SC      Inject 28 Units Subcutaneous daily (with dinner)        order for DME     10 each    Equipment being ordered: Ostomy supplies,Misc [employer] ostomy drainable pouches. #[id number]    S/P hysterectomy       phenazopyridine 97.5 MG tablet    AZO    12 tablet    Take 2 tablets (195 mg) by mouth 3 times daily    Acute cystitis without hematuria       polyethylene glycol powder    MIRALAX    238 g    Take one to two capfuls a day by mouth. Adjust dose so having 2-3 bowel movements a day.    Hepatic cirrhosis, unspecified hepatic cirrhosis type (H)       senna-docusate 8.6-50 MG per tablet    SENOKOT-S;PERICOLACE    100 tablet    Take 2 tablets by mouth 2 times daily as needed for constipation    Endometrial cancer (H)       Simethicone 125 MG Caps     28 capsule    Take 125 mg by mouth 2 times daily as needed    Abdominal distension (gaseous)       spironolactone 100 MG tablet    ALDACTONE    60 tablet    Take 2 tablets (200 mg) by mouth daily     Hepatic cirrhosis, unspecified hepatic cirrhosis type (H)       sulfamethoxazole-trimethoprim 800-160 MG per tablet    BACTRIM DS/SEPTRA DS     Take 1 tablet by mouth 2 times daily        vitamin B complex with vitamin C Tabs tablet    STRESS TAB     Take 1 tablet by mouth daily.        * Notice:  This list has 3 medication(s) that are the same as other medications prescribed for you. Read the directions carefully, and ask your doctor or other care provider to review them with you.

## 2018-09-24 NOTE — PROGRESS NOTES
Hepatology Follow-up Clinic note  Fani Escobedo   Date of Birth 1955  Date of Service 9/24/2018    Reason for follow-up: SIN         Assessment/plan:   Fani Escobedo is a 63 year old female with likely cirrhosis likely due to SIN, complicated by ascites and anasarca. No overt HE or asterixis on exam. Her fluid status has improved with diuretics and she is willing to stay on current dose. She and her  will be going down to [facility] in Nov/Dec to April so will plan to have her follow up and complete variceal screening prior to that. Her platelets are low, otherwise normal liver function. Current MELD is 7 (previously 9). She is currently up to date with HCC screening. Encouraged improving her nutrition to ensure regular PO intake.     - 2000 mg sodium diet  - Continue 40 mg Lasix and 100 mg spironolactone daily  - Constipation: Start taking 1-2 capsfuls of MiraLax daily (dose to 2-3 bowel movements a day)  - Screen for esophageal varices: schedule EGD in the near future   - HCC screening repeat abdominal ultrasound in 2 months  - Abstain from ETOH   - Follow-up in clinic with Dr. Stinson in 2 months    Moon Carpenter PA-C   [facility]    -----------------------------------------------------       HPI:   Fani Escobedo is a 63 year old female presenting for follow-up.     Cirrhosis  - dx Aug 2018  - ?SIN  - hx ascites  - no hx HE or variceal bleed  - last EGD- nil  - HCC screening- CT C/A/P July 2018    Patient last saw Dr. Stinson on 8/15/2018. She was started on diuretics at that time and was set up for paracentesis. There was not enough fluid in abdomen to remove with paracentesis. She has been taking diuretics and having some intermittent cramping.  The cramping is now improved, most cramping occurs around her distal right limb at the site of amputation.  She has not tried taking tonic water for cramping.  She states that overall swelling has improved but there is still some in the  tissue on her abdomen and her lower extremities.  Her trocar incision is fully closed.    She does not have much of an appetite which she attributes to just not feeling like eating.  She states that she thinks she does have some confusion and difficulty recalling things.  She has a bowel movement once every couple of days.  On those days, she has 3-4 loose bowel movements.  She takes Senokot as needed.  She drinks 3-5 bottles of water throughout the day.    History of hysterectomy on 8/8/2018, stage 1a, grade 1 endometrial adenocarcinoma. No further treatment required and will follow up with Gyn-Onc every six months .    Patient denies jaundice.  Patient also denies melena, hematochezia or hematemesis. Patient fevers, sweats or chills.    She states she will have a shot or drink of ETOH on special occassions. She continues to smoke cigarettes and has no plans to quit.     Medical hx Surgical hx   Past Medical History:   Diagnosis Date     Charcot foot due to diabetes mellitus (H)      COPD (chronic obstructive pulmonary disease) (H)      Depression      Diabetes mellitus (H)      Diabetic neuropathy (H)      Endometrial cancer (H)      Hypothyroid      Iron deficiency anemia      Obesity      USHA (obstructive sleep apnea)      Osteomyelitis (H)      Tobacco abuse     Past Surgical History:   Procedure Laterality Date     AMPUTATE LEG BELOW KNEE  4/14/2012    Procedure:AMPUTATE LEG BELOW KNEE; right leg below knee amputation; Surgeon:WILMAR LUI; Location: OR     AMPUTATE TOE(S)  5/1/2013    Procedure: AMPUTATE TOE(S);  PARTIAL LEFT 3RD TOE AMPUTATION;  Surgeon: Juancarlos Gamez DPM;  Location:  OR     AMPUTATION      (L) 2nd toe     ANESTHESIA OUT OF OR MRI  6/11/2013    Procedure: ANESTHESIA OUT OF OR MRI;  MRI UNDER GENERAL ANESTHESIA;  Surgeon: Provider, Generic Anesthesia;  Location:  OR     ANESTHESIA OUT OF OR MRI  9/4/2013    Procedure: ANESTHESIA OUT OF OR MRI;  ANESTHESIA OUT OF OR MRI;   Surgeon: Provider, Generic Anesthesia;  Location:  OR     BONE MARROW BIOPSY, BONE SPECIMEN, NEEDLE/TROCAR N/A 6/7/2018    Procedure: BIOPSY BONE MARROW;  bone marrow biopsy;  Surgeon: Farzana Teixeira MD;  Location:  GI     CYSTOSCOPY N/A 8/8/2018    Procedure: CYSTOSCOPY;;  Surgeon: Trudy Cortez MD;  Location: [facility] OR     DAVINCI HYSTERECTOMY TOTAL, BILATERAL SALPINGO-OOPHORECTOMY, COMBINED N/A 8/8/2018    Procedure: COMBINED DAVINCI HYSTERECTOMY TOTAL, SALPINGO-OOPHORECTOMY;  DaVinci Assisted Total Laparoscopic Hysterectomy, Bilateral Salpingo-Oophorectomy, Cystoscopy, Bilateral Pelvic Lymph Node Dissection;  Surgeon: Trudy Cortez MD;  Location: [facility] OR     DILATION AND CURETTAGE N/A 6/6/2018    Procedure: DILATION AND CURETTAGE;  DILATION AND CURETTAGE ;  Surgeon: Tip Ortega MD;  Location:  OR     EXPLORE SPINE, REMOVE HARDWARE, COMBINED  10/11/2013    Procedure: COMBINED EXPLORE SPINE, REMOVE HARDWARE;  EXPLORATION AND REVISION POSTERIOR THORACIC WOUND WITH WOUND VAC PLACEMENT.;  Surgeon: Thomas Ellis MD;  Location:  OR     FUSION SPINE ANTERIOR THORACIC ONE LEVEL  9/5/2013    Procedure: FUSION SPINE ANTERIOR THORACIC ONE LEVEL;  Right Thoracotomy For  T7-T8 Thorasic Vertebral Body Resection with Strut Graft, Clanton Instrumentation T6-T9, BMP, Pyramesh and Intra-operative Neuro Monitoring ;  Surgeon: Thomas Ellis MD;  Location:  OR     HYSTERECTOMY RADICAL, SALPINGO-OOPHORECTOMY, NODE DISSECTION  08/08/2018    DaVinci assisted TLH, BSO, BPLND; Surgeon: Trudy Cortez MD     INCISION AND DRAINAGE RECTUM, COMBINED  6/11/2012    Procedure: COMBINED INCISION AND DRAINAGE RECTUM;  I&D ABSCESS RIGHT BUTTOCK (MRSA );  Surgeon: Christos Linton MD;  Location:  OR     INCISION AND DRAINAGE SPINE, CLOSE WOUND, COMBINED  10/14/2013    Procedure: COMBINED INCISION AND DRAINAGE SPINE, CLOSE WOUND;  EXPLORATION/REVISION POSTERIOR THORACIC WOUND COMPLEX 15 CM. ;   Surgeon: Thomas Ellis MD;  Location: [facility] OR     IRRIGATION AND DEBRIDEMENT TRUNK, COMBINED  6/10/2012    Procedure: COMBINED IRRIGATION AND DEBRIDEMENT TRUNK;  Incision and drainage abscess right buttock;  Surgeon: Christos Linton MD;  Location:  OR     OPTICAL TRACKING SYSTEM FUSION POSTERIOR SPINE THORACIC THREE+ LEVELS  9/6/2013    Procedure: OPTICAL TRACKING SYSTEM FUSION POSTERIOR SPINE THORACIC THREE+ LEVELS;  T4-T11 POSTERIOR LATERAL PEDICLE SCREW INSTRUMENTATION WITH IMAGE GUIDANCE AND NEURO-MONITORING;  Surgeon: Thomas Ellis MD;  Location:  OR                 Medications:     Current Outpatient Prescriptions   Medication     B Complex Vitamins (VITAMIN  B COMPLEX) tablet     ferrous sulfate (IRON) 325 (65 Fe) MG tablet     furosemide (LASIX) 40 MG tablet     GABAPENTIN PO     HYDROmorphone (DILAUDID) 2 MG tablet     Insulin Aspart (NOVOLOG SC)     Insulin Aspart (NOVOLOG SC)     Insulin Aspart (NOVOLOG SC)     insulin detemir (LEVEMIR FLEXPEN/FLEXTOUCH) 100 UNIT/ML injection     Lactobacillus (ACIDOPHILUS PO)     LEVOTHYROXINE SODIUM PO     METFORMIN HCL PO     nitroFURantoin, macrocrystal-monohydrate, (MACROBID) 100 MG capsule     order for DME     phenazopyridine (AZO) 97.5 MG tablet     polyethylene glycol (MIRALAX) powder     potassium chloride (KLOR-CON) 8 MEQ CR tablet     senna-docusate (SENOKOT-S;PERICOLACE) 8.6-50 MG per tablet     Simethicone 125 MG CAPS     spironolactone (ALDACTONE) 100 MG tablet     sulfamethoxazole-trimethoprim (BACTRIM DS/SEPTRA DS) 800-160 MG per tablet     No current facility-administered medications for this visit.             Allergies:     Allergies   Allergen Reactions     Adhesive Tape      Cephalosporins Anaphylaxis     Naproxen Anaphylaxis     Penicillins      Augmentin Rash     Benadryl [Anti-Itch] Rash     Morphine Hcl Itching and Rash            Review of Systems:   10 points ROS was obtained and highlighted in the HPI, otherwise  "negative.          Physical Exam:   VS:  /61  Pulse 88  Temp 98.3  F (36.8  C) (Oral)  Ht 1.753 m (5' 9\")  SpO2 95%  BMI 38.69 kg/m2      Gen: A&Ox3, NAD, obese, appears chronically ill  HEENT: non-icteric   CV: RRR, no overt murmurs  Lung: CTA Bilatererally, no wheezing or crackles.   Lym- no palpable lymphadenopathy  Abd: soft, NT, ND, well healed surgical scars. Pannus with mild generalized pitting edema, no induration or erythema.  Ext: intact pulses, R BKA, minimal pitting edema bilaterally   Skin: No rash no palmar erythema, telangiectasias or jaundice  Neuro: grossly intact, no asterixis   Psych: appropriate mood and affects           Data:   Reviewed in person and significant for:    Lab Results   Component Value Date     08/31/2018      Lab Results   Component Value Date    POTASSIUM 4.8 08/31/2018     Lab Results   Component Value Date    CHLORIDE 102 08/31/2018     Lab Results   Component Value Date    CO2 27 08/31/2018     Lab Results   Component Value Date    BUN 26 08/31/2018     Lab Results   Component Value Date    CR 0.91 08/31/2018       Lab Results   Component Value Date    WBC 6.3 08/21/2018     Lab Results   Component Value Date    HGB 10.8 08/21/2018     Lab Results   Component Value Date    HCT 32.9 08/21/2018     Lab Results   Component Value Date    MCV 95 08/21/2018     Lab Results   Component Value Date    PLT 85 08/21/2018       Lab Results   Component Value Date    AST 64 08/21/2018     Lab Results   Component Value Date    ALT 38 08/21/2018     Lab Results   Component Value Date    BILICONJ 0.0 10/14/2013      Lab Results   Component Value Date    BILITOTAL 0.6 08/21/2018       Lab Results   Component Value Date    ALBUMIN 2.7 08/21/2018     Lab Results   Component Value Date    PROTTOTAL 6.9 08/21/2018      Lab Results   Component Value Date    ALKPHOS 93 08/21/2018       Lab Results   Component Value Date    INR 1.1 08/21/2018    INR 1.27 08/09/2018       "

## 2018-09-24 NOTE — LETTER
9/24/2018      RE: Fani Escobedo  [address] [place] [address]       Hepatology Follow-up Clinic note  Fani Escobedo   Date of Birth 1955  Date of Service 9/24/2018    Reason for follow-up: SIN         Assessment/plan:   Fani Escobedo is a 63 year old female with likely cirrhosis likely due to SIN, complicated by ascites and anasarca. No overt HE or asterixis on exam. Her fluid status has improved with diuretics and she is willing to stay on current dose. She and her  will be going down to [facility] in Nov/Dec to April so will plan to have her follow up and complete variceal screening prior to that. Her platelets are low, otherwise normal liver function. Current MELD is 7 (previously 9). She is currently up to date with HCC screening. Encouraged improving her nutrition to ensure regular PO intake.     - 2000 mg sodium diet  - Continue 40 mg Lasix and 100 mg spironolactone daily  - Constipation: Start taking 1-2 capsfuls of MiraLax daily (dose to 2-3 bowel movements a day)  - Screen for esophageal varices: schedule EGD in the near future   - HCC screening repeat abdominal ultrasound in 2 months  - Abstain from ETOH   - Follow-up in clinic with Dr. Stinson in 2 months    Moon Carpenter PA-C   [facility]    -----------------------------------------------------       HPI:   Fani Escobedo is a 63 year old female presenting for follow-up.     Cirrhosis  - dx Aug 2018  - ?SIN  - hx ascites  - no hx HE or variceal bleed  - last EGD- nil  - HCC screening- CT C/A/P July 2018    Patient last saw Dr. Stinson on 8/15/2018. She was started on diuretics at that time and was set up for paracentesis. There was not enough fluid in abdomen to remove with paracentesis. She has been taking diuretics and having some intermittent cramping.  The cramping is now improved, most cramping occurs around her distal right limb at the site of amputation.  She has not tried taking tonic water for cramping.   She states that overall swelling has improved but there is still some in the tissue on her abdomen and her lower extremities.  Her trocar incision is fully closed.    She does not have much of an appetite which she attributes to just not feeling like eating.  She states that she thinks she does have some confusion and difficulty recalling things.  She has a bowel movement once every couple of days.  On those days, she has 3-4 loose bowel movements.  She takes Senokot as needed.  She drinks 3-5 bottles of water throughout the day.    History of hysterectomy on 8/8/2018, stage 1a, grade 1 endometrial adenocarcinoma. No further treatment required and will follow up with Gyn-Onc every six months .    Patient denies jaundice.  Patient also denies melena, hematochezia or hematemesis. Patient fevers, sweats or chills.    She states she will have a shot or drink of ETOH on special occassions. She continues to smoke cigarettes and has no plans to quit.     Medical hx Surgical hx   Past Medical History:   Diagnosis Date     Charcot foot due to diabetes mellitus (H)      COPD (chronic obstructive pulmonary disease) (H)      Depression      Diabetes mellitus (H)      Diabetic neuropathy (H)      Endometrial cancer (H)      Hypothyroid      Iron deficiency anemia      Obesity      USHA (obstructive sleep apnea)      Osteomyelitis (H)      Tobacco abuse     Past Surgical History:   Procedure Laterality Date     AMPUTATE LEG BELOW KNEE  4/14/2012    Procedure:AMPUTATE LEG BELOW KNEE; right leg below knee amputation; Surgeon:WILMAR LUI; Location: OR     AMPUTATE TOE(S)  5/1/2013    Procedure: AMPUTATE TOE(S);  PARTIAL LEFT 3RD TOE AMPUTATION;  Surgeon: Juancarlos Gamez DPM;  Location:  OR     AMPUTATION      (L) 2nd toe     ANESTHESIA OUT OF OR MRI  6/11/2013    Procedure: ANESTHESIA OUT OF OR MRI;  MRI UNDER GENERAL ANESTHESIA;  Surgeon: Provider, Generic Anesthesia;  Location:  OR     ANESTHESIA OUT OF OR MRI   9/4/2013    Procedure: ANESTHESIA OUT OF OR MRI;  ANESTHESIA OUT OF OR MRI;  Surgeon: Provider, Generic Anesthesia;  Location:  OR     BONE MARROW BIOPSY, BONE SPECIMEN, NEEDLE/TROCAR N/A 6/7/2018    Procedure: BIOPSY BONE MARROW;  bone marrow biopsy;  Surgeon: Farzana Tiexeira MD;  Location:  GI     CYSTOSCOPY N/A 8/8/2018    Procedure: CYSTOSCOPY;;  Surgeon: Trudy Cortez MD;  [facility]     DAVINCI HYSTERECTOMY TOTAL, BILATERAL SALPINGO-OOPHORECTOMY, COMBINED N/A 8/8/2018    Procedure: COMBINED DAVINCI HYSTERECTOMY TOTAL, SALPINGO-OOPHORECTOMY;  DaVinci Assisted Total Laparoscopic Hysterectomy, Bilateral Salpingo-Oophorectomy, Cystoscopy, Bilateral Pelvic Lymph Node Dissection;  Surgeon: Trudy Cortez MD;  [facility]     DILATION AND CURETTAGE N/A 6/6/2018    Procedure: DILATION AND CURETTAGE;  DILATION AND CURETTAGE ;  Surgeon: Tip Ortega MD;  Location:  OR     EXPLORE SPINE, REMOVE HARDWARE, COMBINED  10/11/2013    Procedure: COMBINED EXPLORE SPINE, REMOVE HARDWARE;  EXPLORATION AND REVISION POSTERIOR THORACIC WOUND WITH WOUND VAC PLACEMENT.;  Surgeon: Thomas Ellis MD;  Location:  OR     FUSION SPINE ANTERIOR THORACIC ONE LEVEL  9/5/2013    Procedure: FUSION SPINE ANTERIOR THORACIC ONE LEVEL;  Right Thoracotomy For  T7-T8 Thorasic Vertebral Body Resection with Strut Graft, [place] Instrumentation T6-T9, BMP, Pyramesh and Intra-operative Neuro Monitoring ;  Surgeon: Thomas Ellis MD;  Location:  OR     HYSTERECTOMY RADICAL, SALPINGO-OOPHORECTOMY, NODE DISSECTION  08/08/2018    DaVinci assisted TLH, BSO, BPLND; Surgeon: Trudy Cortez MD     INCISION AND DRAINAGE RECTUM, COMBINED  6/11/2012    Procedure: COMBINED INCISION AND DRAINAGE RECTUM;  I&D ABSCESS RIGHT BUTTOCK (MRSA );  Surgeon: Christos Linton MD;  Location:  OR     INCISION AND DRAINAGE SPINE, CLOSE WOUND, COMBINED  10/14/2013    Procedure: COMBINED INCISION AND DRAINAGE SPINE,  CLOSE WOUND;  EXPLORATION/REVISION POSTERIOR THORACIC WOUND COMPLEX 15 CM. ;  Surgeon: Thomas Ellis MD;  Location: [facility]     IRRIGATION AND DEBRIDEMENT TRUNK, COMBINED  6/10/2012    Procedure: COMBINED IRRIGATION AND DEBRIDEMENT TRUNK;  Incision and drainage abscess right buttock;  Surgeon: Christos Linton MD;  Location:  OR     OPTICAL TRACKING SYSTEM FUSION POSTERIOR SPINE THORACIC THREE+ LEVELS  9/6/2013    Procedure: OPTICAL TRACKING SYSTEM FUSION POSTERIOR SPINE THORACIC THREE+ LEVELS;  T4-T11 POSTERIOR LATERAL PEDICLE SCREW INSTRUMENTATION WITH IMAGE GUIDANCE AND NEURO-MONITORING;  Surgeon: Thomas Ellis MD;  Location:  OR                 Medications:     Current Outpatient Prescriptions   Medication     B Complex Vitamins (VITAMIN  B COMPLEX) tablet     ferrous sulfate (IRON) 325 (65 Fe) MG tablet     furosemide (LASIX) 40 MG tablet     GABAPENTIN PO     HYDROmorphone (DILAUDID) 2 MG tablet     Insulin Aspart (NOVOLOG SC)     Insulin Aspart (NOVOLOG SC)     Insulin Aspart (NOVOLOG SC)     insulin detemir (LEVEMIR FLEXPEN/FLEXTOUCH) 100 UNIT/ML injection     Lactobacillus (ACIDOPHILUS PO)     LEVOTHYROXINE SODIUM PO     METFORMIN HCL PO     nitroFURantoin, macrocrystal-monohydrate, (MACROBID) 100 MG capsule     order for DME     phenazopyridine (AZO) 97.5 MG tablet     polyethylene glycol (MIRALAX) powder     potassium chloride (KLOR-CON) 8 MEQ CR tablet     senna-docusate (SENOKOT-S;PERICOLACE) 8.6-50 MG per tablet     Simethicone 125 MG CAPS     spironolactone (ALDACTONE) 100 MG tablet     sulfamethoxazole-trimethoprim (BACTRIM DS/SEPTRA DS) 800-160 MG per tablet     No current facility-administered medications for this visit.             Allergies:     Allergies   Allergen Reactions     Adhesive Tape      Cephalosporins Anaphylaxis     Naproxen Anaphylaxis     Penicillins      Augmentin Rash     Benadryl [Anti-Itch] Rash     Morphine Hcl Itching and Rash            Review  "of Systems:   10 points ROS was obtained and highlighted in the HPI, otherwise negative.          Physical Exam:   VS:  /61  Pulse 88  Temp 98.3  F (36.8  C) (Oral)  Ht 1.753 m (5' 9\")  SpO2 95%  BMI 38.69 kg/m2      Gen: A&Ox3, NAD, obese, appears chronically ill  HEENT: non-icteric   CV: RRR, no overt murmurs  Lung: CTA Bilatererally, no wheezing or crackles.   Lym- no palpable lymphadenopathy  Abd: soft, NT, ND, well healed surgical scars. Pannus with mild generalized pitting edema, no induration or erythema.  Ext: intact pulses, R BKA, minimal pitting edema bilaterally   Skin: No rash no palmar erythema, telangiectasias or jaundice  Neuro: grossly intact, no asterixis   Psych: appropriate mood and affects           Data:   Reviewed in person and significant for:    Lab Results   Component Value Date     08/31/2018      Lab Results   Component Value Date    POTASSIUM 4.8 08/31/2018     Lab Results   Component Value Date    CHLORIDE 102 08/31/2018     Lab Results   Component Value Date    CO2 27 08/31/2018     Lab Results   Component Value Date    BUN 26 08/31/2018     Lab Results   Component Value Date    CR 0.91 08/31/2018       Lab Results   Component Value Date    WBC 6.3 08/21/2018     Lab Results   Component Value Date    HGB 10.8 08/21/2018     Lab Results   Component Value Date    HCT 32.9 08/21/2018     Lab Results   Component Value Date    MCV 95 08/21/2018     Lab Results   Component Value Date    PLT 85 08/21/2018       Lab Results   Component Value Date    AST 64 08/21/2018     Lab Results   Component Value Date    ALT 38 08/21/2018     Lab Results   Component Value Date    BILICONJ 0.0 10/14/2013      Lab Results   Component Value Date    BILITOTAL 0.6 08/21/2018       Lab Results   Component Value Date    ALBUMIN 2.7 08/21/2018     Lab Results   Component Value Date    PROTTOTAL 6.9 08/21/2018      Lab Results   Component Value Date    ALKPHOS 93 08/21/2018       Lab Results "   Component Value Date    INR 1.1 08/21/2018    INR 1.27 08/09/2018       Moon Carpenter PA-C

## 2018-09-24 NOTE — NURSING NOTE
Chief Complaint   Patient presents with     RECHECK     [place] cirrhosis/ascites     Nemesio Gibbs MA

## 2018-09-25 NOTE — NURSING NOTE
Met with patient and her  along with Moon Carpenter PA-C. Patient reports poor appetite, adequate fluid intake, BM every other day, improved lower extremity edema with lessening cramping, no fluid in abdomen to drain at last attempted para, confusion or fogginess at times, healed incisional site with no further drainage. Plan per Moon: Start miralax to obtain daily bowel movements, EGD with Dr. Stinson and follow up in clinic with Dr. Stinson prior to leaving for [place] for winter. Will plan to have ultrasound in January, patient will follow at [facility] in [facility] [phone], with Dr. Mariano (preferred) or Dr. Sherry Rivera.

## 2018-10-03 ENCOUNTER — TELEPHONE (OUTPATIENT)
Dept: GASTROENTEROLOGY | Facility: CLINIC | Age: 63
End: 2018-10-03

## 2018-10-03 NOTE — TELEPHONE ENCOUNTER
Spoke with pt regarding medication refills. Pt stated that she is out of her Aldactone and Lasix. Reviewed pt's medications and pt was prescribed the following medications on 8/22/18 with 1 refill.   Aldactone 100 mg tablets- Take 2 tablets (200 mg total) by mouth daily  Lasix 40 mg tablets- Take 2 tablets (80 mg total) by mouth daily    Discussed with pt that she should still have 1 refill left at pharmacy and pt stated that she does not have any refills. Spoke with pharmacist at [facility] pharmacy who confirmed prescriptions on file and stated that pt picked up refills of Aldactone and Lasix on 9/21/18. Called pt back to see if pt's  or family member may have picked up the refills without her knowing. Pt spoke with  and found that  had picked up refills of these medications. Will send 3 month supply of medications to mail order pharmacy as requested and update Veronica Vo, pt's RN Care Coordinator.

## 2018-10-03 NOTE — TELEPHONE ENCOUNTER
[facility] Call Center    Phone Message    May a detailed message be left on voicemail: yes    Reason for Call: Medication Refill Request    Has the patient contacted the pharmacy for the refill? Yes   Name of medication being requested: spironolactone (ALDACTONE) 100 MG tablet and furosemide (LASIX) 40 MG tablet  Provider who prescribed the medication: Dr. Milad Mendez  Pharmacy: [facility]  Date medication is needed: ASAP-pt states she is out of both of these meds. [email or web address], and a 3 month supply sent to the [facility] mail order pharmacy. Please advise.     Action Taken: Message routed to:  [facility] Hepatology

## 2018-10-04 DIAGNOSIS — K74.60 HEPATIC CIRRHOSIS, UNSPECIFIED HEPATIC CIRRHOSIS TYPE (H): ICD-10-CM

## 2018-10-04 RX ORDER — FUROSEMIDE 40 MG
80 TABLET ORAL DAILY
Qty: 180 TABLET | Refills: 3 | Status: SHIPPED | OUTPATIENT
Start: 2018-10-04 | End: 2020-10-23

## 2018-10-04 RX ORDER — SPIRONOLACTONE 100 MG/1
200 TABLET, FILM COATED ORAL DAILY
Qty: 180 TABLET | Refills: 3 | Status: SHIPPED | OUTPATIENT
Start: 2018-10-04

## 2018-10-04 NOTE — PROGRESS NOTES
Notified patient of refills for lasix and spironolactone sent to [email or web address] mail order pharmacy. Patient requests ultrasound be scheduled at next office visit on 11/5. Request sent to scheduling. Patient has no further concerns at this time.

## 2018-10-10 DIAGNOSIS — K74.60 HEPATIC CIRRHOSIS, UNSPECIFIED HEPATIC CIRRHOSIS TYPE (H): Primary | ICD-10-CM

## 2018-10-24 ENCOUNTER — TELEPHONE (OUTPATIENT)
Dept: GASTROENTEROLOGY | Facility: CLINIC | Age: 63
End: 2018-10-24

## 2018-11-01 ENCOUNTER — HOSPITAL ENCOUNTER (OUTPATIENT)
Facility: CLINIC | Age: 63
Discharge: HOME | End: 2018-11-01
Attending: INTERNAL MEDICINE | Admitting: INTERNAL MEDICINE
Payer: COMMERCIAL

## 2018-11-01 ENCOUNTER — ANESTHESIA EVENT (OUTPATIENT)
Dept: GASTROENTEROLOGY | Facility: CLINIC | Age: 63
End: 2018-11-01
Payer: COMMERCIAL

## 2018-11-01 ENCOUNTER — ANESTHESIA (OUTPATIENT)
Dept: GASTROENTEROLOGY | Facility: CLINIC | Age: 63
End: 2018-11-01
Payer: COMMERCIAL

## 2018-11-01 VITALS
SYSTOLIC BLOOD PRESSURE: 96 MMHG | TEMPERATURE: 97.7 F | RESPIRATION RATE: 20 BRPM | DIASTOLIC BLOOD PRESSURE: 49 MMHG | OXYGEN SATURATION: 95 %

## 2018-11-01 DIAGNOSIS — K74.60 HEPATIC CIRRHOSIS, UNSPECIFIED HEPATIC CIRRHOSIS TYPE (H): ICD-10-CM

## 2018-11-01 LAB
ANION GAP SERPL CALCULATED.3IONS-SCNC: 10 MMOL/L (ref 3–14)
BUN SERPL-MCNC: 24 MG/DL (ref 7–30)
CALCIUM SERPL-MCNC: 8.7 MG/DL (ref 8.5–10.1)
CHLORIDE SERPL-SCNC: 96 MMOL/L (ref 94–109)
CO2 SERPL-SCNC: 23 MMOL/L (ref 20–32)
CREAT SERPL-MCNC: 0.97 MG/DL (ref 0.52–1.04)
GFR SERPL CREATININE-BSD FRML MDRD: 58 ML/MIN/1.7M2
GLUCOSE BLDC GLUCOMTR-MCNC: 273 MG/DL (ref 70–99)
GLUCOSE BLDC GLUCOMTR-MCNC: 344 MG/DL (ref 70–99)
GLUCOSE SERPL-MCNC: 335 MG/DL (ref 70–99)
POTASSIUM SERPL-SCNC: 5.2 MMOL/L (ref 3.4–5.3)
SODIUM SERPL-SCNC: 129 MMOL/L (ref 133–144)
UPPER GI ENDOSCOPY: NORMAL

## 2018-11-01 PROCEDURE — 80048 BASIC METABOLIC PNL TOTAL CA: CPT | Performed by: ANESTHESIOLOGY

## 2018-11-01 PROCEDURE — 43244 EGD VARICES LIGATION: CPT | Performed by: INTERNAL MEDICINE

## 2018-11-01 PROCEDURE — 40000141 ZZH STATISTIC PERIPHERAL IV START W/O US GUIDANCE

## 2018-11-01 PROCEDURE — 25000125 ZZHC RX 250: Performed by: NURSE ANESTHETIST, CERTIFIED REGISTERED

## 2018-11-01 PROCEDURE — 37000008 ZZH ANESTHESIA TECHNICAL FEE, 1ST 30 MIN: Performed by: INTERNAL MEDICINE

## 2018-11-01 PROCEDURE — 25000128 H RX IP 250 OP 636: Performed by: NURSE ANESTHETIST, CERTIFIED REGISTERED

## 2018-11-01 PROCEDURE — 36415 COLL VENOUS BLD VENIPUNCTURE: CPT | Performed by: ANESTHESIOLOGY

## 2018-11-01 PROCEDURE — 37000009 ZZH ANESTHESIA TECHNICAL FEE, EACH ADDTL 15 MIN: Performed by: INTERNAL MEDICINE

## 2018-11-01 PROCEDURE — 82962 GLUCOSE BLOOD TEST: CPT

## 2018-11-01 RX ORDER — SODIUM CHLORIDE, SODIUM LACTATE, POTASSIUM CHLORIDE, CALCIUM CHLORIDE 600; 310; 30; 20 MG/100ML; MG/100ML; MG/100ML; MG/100ML
INJECTION, SOLUTION INTRAVENOUS CONTINUOUS
Status: DISCONTINUED | OUTPATIENT
Start: 2018-11-01 | End: 2018-11-06 | Stop reason: HOSPADM

## 2018-11-01 RX ORDER — ONDANSETRON 4 MG/1
4 TABLET, ORALLY DISINTEGRATING ORAL EVERY 6 HOURS PRN
Status: DISCONTINUED | OUTPATIENT
Start: 2018-11-01 | End: 2018-11-06 | Stop reason: HOSPADM

## 2018-11-01 RX ORDER — FENTANYL CITRATE 50 UG/ML
INJECTION, SOLUTION INTRAMUSCULAR; INTRAVENOUS PRN
Status: DISCONTINUED | OUTPATIENT
Start: 2018-11-01 | End: 2018-11-01

## 2018-11-01 RX ORDER — ONDANSETRON 2 MG/ML
4 INJECTION INTRAMUSCULAR; INTRAVENOUS
Status: DISCONTINUED | OUTPATIENT
Start: 2018-11-01 | End: 2018-11-06 | Stop reason: HOSPADM

## 2018-11-01 RX ORDER — MEPERIDINE HYDROCHLORIDE 50 MG/ML
12.5 INJECTION INTRAMUSCULAR; INTRAVENOUS; SUBCUTANEOUS
Status: DISCONTINUED | OUTPATIENT
Start: 2018-11-01 | End: 2018-11-06 | Stop reason: HOSPADM

## 2018-11-01 RX ORDER — ONDANSETRON 2 MG/ML
4 INJECTION INTRAMUSCULAR; INTRAVENOUS EVERY 30 MIN PRN
Status: DISCONTINUED | OUTPATIENT
Start: 2018-11-01 | End: 2018-11-06 | Stop reason: HOSPADM

## 2018-11-01 RX ORDER — NALOXONE HYDROCHLORIDE 0.4 MG/ML
.1-.4 INJECTION, SOLUTION INTRAMUSCULAR; INTRAVENOUS; SUBCUTANEOUS
Status: ACTIVE | OUTPATIENT
Start: 2018-11-01 | End: 2018-11-02

## 2018-11-01 RX ORDER — FLUMAZENIL 0.1 MG/ML
0.2 INJECTION, SOLUTION INTRAVENOUS
Status: ACTIVE | OUTPATIENT
Start: 2018-11-01 | End: 2018-11-02

## 2018-11-01 RX ORDER — LIDOCAINE HYDROCHLORIDE 40 MG/ML
SOLUTION TOPICAL PRN
Status: DISCONTINUED | OUTPATIENT
Start: 2018-11-01 | End: 2018-11-01

## 2018-11-01 RX ORDER — ONDANSETRON 2 MG/ML
INJECTION INTRAMUSCULAR; INTRAVENOUS PRN
Status: DISCONTINUED | OUTPATIENT
Start: 2018-11-01 | End: 2018-11-01

## 2018-11-01 RX ORDER — SODIUM CHLORIDE, SODIUM LACTATE, POTASSIUM CHLORIDE, CALCIUM CHLORIDE 600; 310; 30; 20 MG/100ML; MG/100ML; MG/100ML; MG/100ML
INJECTION, SOLUTION INTRAVENOUS CONTINUOUS PRN
Status: DISCONTINUED | OUTPATIENT
Start: 2018-11-01 | End: 2018-11-01

## 2018-11-01 RX ORDER — ONDANSETRON 2 MG/ML
4 INJECTION INTRAMUSCULAR; INTRAVENOUS EVERY 6 HOURS PRN
Status: DISCONTINUED | OUTPATIENT
Start: 2018-11-01 | End: 2018-11-06 | Stop reason: HOSPADM

## 2018-11-01 RX ORDER — PROPOFOL 10 MG/ML
INJECTION, EMULSION INTRAVENOUS CONTINUOUS PRN
Status: DISCONTINUED | OUTPATIENT
Start: 2018-11-01 | End: 2018-11-01

## 2018-11-01 RX ORDER — ONDANSETRON 4 MG/1
4 TABLET, ORALLY DISINTEGRATING ORAL EVERY 30 MIN PRN
Status: DISCONTINUED | OUTPATIENT
Start: 2018-11-01 | End: 2018-11-06 | Stop reason: HOSPADM

## 2018-11-01 RX ORDER — LIDOCAINE 40 MG/G
CREAM TOPICAL
Status: DISCONTINUED | OUTPATIENT
Start: 2018-11-01 | End: 2018-11-06 | Stop reason: HOSPADM

## 2018-11-01 RX ADMIN — LIDOCAINE HYDROCHLORIDE 8 ML: 40 SOLUTION TOPICAL at 14:30

## 2018-11-01 RX ADMIN — FENTANYL CITRATE 25 MCG: 50 INJECTION, SOLUTION INTRAMUSCULAR; INTRAVENOUS at 14:27

## 2018-11-01 RX ADMIN — MIDAZOLAM 1 MG: 1 INJECTION INTRAMUSCULAR; INTRAVENOUS at 14:23

## 2018-11-01 RX ADMIN — ONDANSETRON 4 MG: 2 INJECTION INTRAMUSCULAR; INTRAVENOUS at 14:53

## 2018-11-01 RX ADMIN — LIDOCAINE HYDROCHLORIDE 10 ML: 20 SOLUTION ORAL; TOPICAL at 14:34

## 2018-11-01 RX ADMIN — PROPOFOL 25 MCG/KG/MIN: 10 INJECTION, EMULSION INTRAVENOUS at 14:32

## 2018-11-01 RX ADMIN — SODIUM CHLORIDE, POTASSIUM CHLORIDE, SODIUM LACTATE AND CALCIUM CHLORIDE: 600; 310; 30; 20 INJECTION, SOLUTION INTRAVENOUS at 14:23

## 2018-11-01 RX ADMIN — FENTANYL CITRATE 25 MCG: 50 INJECTION, SOLUTION INTRAMUSCULAR; INTRAVENOUS at 14:32

## 2018-11-01 NOTE — ANESTHESIA POSTPROCEDURE EVALUATION
Anesthesia POST Procedure Evaluation    Patient: Fani Escobedo   MRN:     [id number] Gender:   female   Age:    63 year old :      1955        Preoperative Diagnosis: Hepatic cirrhosis, unspecified hepatic cirrhosis type - Prep mailed   Procedure(s):  COMBINED ESOPHAGOSCOPY, GASTROSCOPY, DUODENOSCOPY (EGD), BANDING/LIGATION VARICES   Postop Comments: No value filed.       Anesthesia Type:  MAC    Reportable Event: NO     PAIN: Uncomplicated   Sign Out status: Comfortable, Well controlled pain     PONV: No PONV   Sign Out status:  No Nausea or Vomiting     Neuro/Psych: Uneventful perioperative course   Sign Out Status: Preoperative baseline; Age appropriate mentation     Airway/Resp.: Uneventful perioperative course   Sign Out Status: Non labored breathing, age appropriate RR; Resp. Status within EXPECTED Parameters     CV: Uneventful perioperative course   Sign Out status: Appropriate BP and perfusion indices; Appropriate HR/Rhythm     Disposition:   Sign Out in:  PACU  Disposition:  Phase II; Home  Recovery Course: Uneventful  Follow-Up: Not required           Last Anesthesia Record Vitals:  CRNA VITALS  2018 1428 - 2018 1514      2018             Resp Rate (observed): 18    EKG: Sinus rhythm          Last PACU/Preop Vitals:  Vitals:    18 1411 18 1502 18 1503   BP:  109/60    Resp:  18 18   Temp: 36.5  C (97.7  F)     SpO2:  94% 94%         Electronically Signed By: Sam Hill MD, 2018, 3:14 PM

## 2018-11-01 NOTE — ANESTHESIA CARE TRANSFER NOTE
Patient: Fani Escobedo    Procedure(s):  COMBINED ESOPHAGOSCOPY, GASTROSCOPY, DUODENOSCOPY (EGD), BANDING/LIGATION VARICES    Diagnosis: Hepatic cirrhosis, unspecified hepatic cirrhosis type - Prep mailed  Diagnosis Additional Information: No value filed.    Anesthesia Type:   No value filed.     Note:  Airway :Room Air  Patient transferred to:PACU (endo pacu)  Comments: Pt responsive, following commands, on RA, spont resps, VSS upon transfer of care.Handoff Report: Identifed the Patient, Identified the Reponsible Provider, Reviewed the pertinent medical history, Discussed the surgical course, Reviewed Intra-OP anesthesia mangement and issues during anesthesia, Set expectations for post-procedure period and Allowed opportunity for questions and acknowledgement of understanding      Vitals: (Last set prior to Anesthesia Care Transfer)    CRNA VITALS  11/1/2018 1428 - 11/1/2018 1507      11/1/2018             Resp Rate (observed): 18    EKG: Sinus rhythm                Electronically Signed By: SHAUNA Santamaria CRNA  November 1, 2018  3:07 PM

## 2018-11-01 NOTE — PROGRESS NOTES
Called pt and she will arrive at 1230 in check in for EGD.  States had pre op physical at [facility] on 10/23/18.  Aware to stay NPO and  will escort pt.

## 2018-11-01 NOTE — DISCHARGE INSTRUCTIONS
Discharge Instructions after  Upper Endoscopy (EGD)    Activity and Diet  You were given medicine for pain. You may be dizzy or sleepy.  For 24 hours:    Do not drive or use heavy equipment.    Do not make important decisions.    Do not drink any alcohol.    Discomfort  You may have a sore throat for 2 to 3 days. It may help to:    Avoid hot liquids for 24 hours.    Use sore throat lozenges.    Gargle as needed with salt water up to 4 times a day. Mix 1 cup of warm water  with 1 teaspoon of salt. Do not swallow.    Your esophagus was dilated (opened) or banded during the exam:    Drink only cool liquids for the rest of the day. Eat a soft diet for the next few days.    You may have a sore chest for 2 to 3 days.    You may take Tylenol (acetaminophen) for pain unless your doctor has told you not to.    Do not take aspirin or ibuprofen (Advil, Motrin) or other NSAIDS  (anti-inflammatory drugs)      Follow-up    Keep all appointments as scheduled including laboratory appointments      When to call us:  Problems are rare. Call right away if you have:    Unusual throat pain or trouble swallowing    Unusual pain in belly or chest that is not relieved by belching or passing air    Black stools (tar-like looking bowel movement)    Temperature above 100.6  F. (37.5  C).    If you vomit blood or have severe pain, go to an emergency room.    If you have questions, call:  Monday to Friday, 7 a.m. to 4:30 p.m.: Endoscopy: [phone] (We may have to call you back)    After hours: Hospital: [phone] (Ask for the GI fellow on call)

## 2018-11-01 NOTE — ANESTHESIA PREPROCEDURE EVALUATION
Anesthesia Pre-Procedure Evaluation    Patient: Fani Escobedo   MRN:     [id number] Gender:   female   Age:    63 year old :      1955        Preoperative Diagnosis: Hepatic cirrhosis, unspecified hepatic cirrhosis type - Prep mailed   Procedure(s):  EGD     Past Medical History:   Diagnosis Date     Charcot foot due to diabetes mellitus (H)     (R) foot -> BKA     COPD (chronic obstructive pulmonary disease) (H)      Depression      Diabetes mellitus (H)     type II     Diabetic neuropathy (H)      Endometrial cancer (H)      Hypothyroid      Iron deficiency anemia      Obesity     bmi 46     USHA (obstructive sleep apnea)     does not use CPAP     Osteomyelitis (H)      Tobacco abuse       Past Surgical History:   Procedure Laterality Date     AMPUTATE LEG BELOW KNEE  2012    Procedure:AMPUTATE LEG BELOW KNEE; right leg below knee amputation; Surgeon:WILMAR LUI; Location: OR     AMPUTATE TOE(S)  2013    Procedure: AMPUTATE TOE(S);  PARTIAL LEFT 3RD TOE AMPUTATION;  Surgeon: Juancarlos Gamez DPM;  Location:  OR     AMPUTATION      (L) 2nd toe     ANESTHESIA OUT OF OR MRI  2013    Procedure: ANESTHESIA OUT OF OR MRI;  MRI UNDER GENERAL ANESTHESIA;  Surgeon: Provider, Generic Anesthesia;  Location:  OR     ANESTHESIA OUT OF OR MRI  2013    Procedure: ANESTHESIA OUT OF OR MRI;  ANESTHESIA OUT OF OR MRI;  Surgeon: Provider, Generic Anesthesia;  Location:  OR     BONE MARROW BIOPSY, BONE SPECIMEN, NEEDLE/TROCAR N/A 2018    Procedure: BIOPSY BONE MARROW;  bone marrow biopsy;  Surgeon: Farzana Teixeira MD;  Location:  GI     CYSTOSCOPY N/A 2018    Procedure: CYSTOSCOPY;;  Surgeon: Trudy Cortez MD;  [facility]     DAVINCI HYSTERECTOMY TOTAL, BILATERAL SALPINGO-OOPHORECTOMY, COMBINED N/A 2018    Procedure: COMBINED DAVINCI HYSTERECTOMY TOTAL, SALPINGO-OOPHORECTOMY;  DaVinci Assisted Total Laparoscopic Hysterectomy, Bilateral Salpingo-Oophorectomy,  Cystoscopy, Bilateral Pelvic Lymph Node Dissection;  Surgeon: Trudy Cortez MD;  Location: [facility] OR     DILATION AND CURETTAGE N/A 6/6/2018    Procedure: DILATION AND CURETTAGE;  DILATION AND CURETTAGE ;  Surgeon: Tip Ortega MD;  Location:  OR     EXPLORE SPINE, REMOVE HARDWARE, COMBINED  10/11/2013    Procedure: COMBINED EXPLORE SPINE, REMOVE HARDWARE;  EXPLORATION AND REVISION POSTERIOR THORACIC WOUND WITH WOUND VAC PLACEMENT.;  Surgeon: Thomas Ellis MD;  Location: [facility] OR     FUSION SPINE ANTERIOR THORACIC ONE LEVEL  9/5/2013    Procedure: FUSION SPINE ANTERIOR THORACIC ONE LEVEL;  Right Thoracotomy For  T7-T8 Thorasic Vertebral Body Resection with Strut Graft, [place] Instrumentation T6-T9, BMP, Pyramesh and Intra-operative Neuro Monitoring ;  Surgeon: Thomas Ellis MD;  Location:  OR     HYSTERECTOMY RADICAL, SALPINGO-OOPHORECTOMY, NODE DISSECTION  08/08/2018    DaVinci assisted TLH, BSO, BPLND; Surgeon: Trudy Cortez MD     INCISION AND DRAINAGE RECTUM, COMBINED  6/11/2012    Procedure: COMBINED INCISION AND DRAINAGE RECTUM;  I&D ABSCESS RIGHT BUTTOCK (MRSA );  Surgeon: Christos Linton MD;  Location:  OR     INCISION AND DRAINAGE SPINE, CLOSE WOUND, COMBINED  10/14/2013    Procedure: COMBINED INCISION AND DRAINAGE SPINE, CLOSE WOUND;  EXPLORATION/REVISION POSTERIOR THORACIC WOUND COMPLEX 15 CM. ;  Surgeon: Thomas Ellis MD;  Location:  OR     IRRIGATION AND DEBRIDEMENT TRUNK, COMBINED  6/10/2012    Procedure: COMBINED IRRIGATION AND DEBRIDEMENT TRUNK;  Incision and drainage abscess right buttock;  Surgeon: Christos Linton MD;  Location:  OR     OPTICAL TRACKING SYSTEM FUSION POSTERIOR SPINE THORACIC THREE+ LEVELS  9/6/2013    Procedure: OPTICAL TRACKING SYSTEM FUSION POSTERIOR SPINE THORACIC THREE+ LEVELS;  T4-T11 POSTERIOR LATERAL PEDICLE SCREW INSTRUMENTATION WITH IMAGE GUIDANCE AND NEURO-MONITORING;  Surgeon: Thomas Ellis MD;   "Location:  OR               [facility] AN PHYSICAL EXAM    Lab Results   Component Value Date    WBC 4.3 09/24/2018    HGB 12.7 09/24/2018    HCT 38.9 09/24/2018    PLT 62 (L) 09/24/2018    CRP 27.5 (H) 10/12/2013    SED 27 10/12/2013     09/24/2018    POTASSIUM 4.7 09/24/2018    CHLORIDE 99 09/24/2018    CO2 24 09/24/2018    BUN 25 09/24/2018    CR 0.87 09/24/2018     (H) 09/24/2018    DESIREE 10.1 09/24/2018    PHOS 3.1 09/06/2013    MAG 1.8 09/16/2013    ALBUMIN 3.6 09/24/2018    PROTTOTAL 8.0 09/24/2018    ALT 32 09/24/2018    AST 40 09/24/2018    ALKPHOS 93 09/24/2018    BILITOTAL 0.7 09/24/2018    LIPASE 38 10/14/2013    AMYLASE <30 (L) 10/14/2013    PTT 30 08/08/2018    INR 1.09 09/24/2018    FIBR 321 08/08/2018    TSH 1.24 06/05/2018       Preop Vitals  BP Readings from Last 3 Encounters:   09/24/18 112/61   08/27/18 97/64   08/15/18 114/57    Pulse Readings from Last 3 Encounters:   09/24/18 88   08/27/18 85   08/15/18 89      Resp Readings from Last 3 Encounters:   08/27/18 16   08/15/18 20   08/13/18 18    SpO2 Readings from Last 3 Encounters:   09/24/18 95%   08/27/18 96%   08/15/18 96%      Temp Readings from Last 1 Encounters:   09/24/18 36.8  C (98.3  F) (Oral)    Ht Readings from Last 1 Encounters:   09/24/18 1.753 m (5' 9\")      Wt Readings from Last 1 Encounters:   08/15/18 118.8 kg (262 lb)    Estimated body mass index is 38.69 kg/(m^2) as calculated from the following:    Height as of 9/24/18: 1.753 m (5' 9\").    Weight as of 8/15/18: 118.8 kg (262 lb).     LDA:            Assessment:   ASA SCORE: 3    NPO Status: > 6 hours since completed Solid [employer]   Documentation: H&P complete; Preop Testing complete; Consents complete   Proceeding: Proceed without further delay  Tobacco Use:  NO Active use of Tobacco/UNKNOWN Tobacco use status     Plan:   Anes. Type:  MAC   Pre-Induction: Midazolam IV   Induction:  Not applicable; IV (Standard)   Airway: Native Airway   Access/Monitoring: PIV "   Maintenance: IV   Emergence: Procedure Site   Logistics: Same Day Surgery     Postop Pain/Sedation Strategy:  Standard-Options: Opioids PRN     PONV Management:  Adult Risk Factors: Female, Non-Smoker, Postop Opioids     CONSENT: Direct conversation   Plan and risks discussed with: Patient   Blood Products: Consent Deferred (Minimal Blood Loss)                  I have examined the patient and reviewed the record with the above resident or APN and agree with above assessment and recommendations.     Sam Hill M.D.  Staff Anesthesiologist  November 1, 2018, 12:36 PM             Sam Hill MD

## 2018-11-01 NOTE — ANESTHESIA POSTPROCEDURE EVALUATION
Anesthesia POST Procedure Evaluation    Patient: Fani Escobedo   MRN:     [id number] Gender:   female   Age:    63 year old :      1955        Preoperative Diagnosis: Hepatic cirrhosis, unspecified hepatic cirrhosis type - Prep mailed   Procedure(s):  COMBINED ESOPHAGOSCOPY, GASTROSCOPY, DUODENOSCOPY (EGD), BANDING/LIGATION VARICES   Postop Comments: No value filed.       Anesthesia Type:  Not documented    Reportable Event: NO     PAIN: Uncomplicated   Sign Out status: Comfortable, Well controlled pain     PONV: No PONV   Sign Out status:  No Nausea or Vomiting     Neuro/Psych: Uneventful perioperative course   Sign Out Status: Preoperative baseline; Age appropriate mentation     Airway/Resp.: Uneventful perioperative course   Sign Out Status: Non labored breathing, age appropriate RR; Resp. Status within EXPECTED Parameters     CV: Uneventful perioperative course   Sign Out status: Appropriate BP and perfusion indices; Appropriate HR/Rhythm     Disposition:   Sign Out in:  Phase II  Disposition:  Home  Recovery Course: Uneventful  Follow-Up: Not required           Last Anesthesia Record Vitals:  CRNA VITALS  2018 1428 - 2018 1513      2018             Resp Rate (observed): 18    EKG: Sinus rhythm          Last PACU/Preop Vitals:  Vitals:    18 1411 18 1502 18 1503   BP:  109/60    Resp:  18 18   Temp: 36.5  C (97.7  F)     SpO2:  94% 94%         Electronically Signed By: Sam Hill MD, 2018, 3:13 PM

## 2018-11-01 NOTE — IP AVS SNAPSHOT
MRN:[id number]                      After Visit Summary   11/1/2018    Fani Escobedo    MRN: [id number]           Thank you!     Thank you for choosing [facility] for your care. Our goal is always to provide you with excellent care. Hearing back from our patients is one way we can continue to improve our services. Please take a few minutes to complete the written survey that you may receive in the mail after you visit with us. Thank you!        Patient Information     Date Of Birth          1955        About your hospital stay     You were admitted on:  November 1, 2018 You last received care in the:  [facility], Endoscopy    You were discharged on:  November 1, 2018       Who to Call     For medical emergencies, please call 911.  For non-urgent questions about your medical care, please call your primary care provider or clinic, [phone]  For questions related to your surgery, please call your surgery clinic        Attending Provider     Provider Specialty    Chapo Vargas MD Gastroenterology       Primary Care Provider Office Phone # Fax #    Marielos MG DO Bharat [phone]      Your next 10 appointments already scheduled     Nov 05, 2018 12:15 PM CST   US ABDOMEN COMPLETE with [email or web address]   [facility] ([facility])    [address]  [facility] [address] [phone]           How do I prepare for my exam? (Food and drink instructions) Adults: No eating, smoking, gum chewing or drinking for 8 hours before the exam. You may take medicine with a small sip of water.  Children: * Infants, breast-fed: may have breast milk up to 2 hours before exam. * Infants, formula: may have bottle until 4 hours before exam. * Children 1-5 years: No food or drink for 4 hours before exam. * Children 6 -12 years: No food or drink for 6 hours before exam. * Children over 12 years: No food or drink for 8 hours before exam.  * J  Tube Fed: No need to stop feedings.  What should I wear: Wear comfortable clothes.  How long does the exam take: Most ultrasounds take 30 to 60 minutes.  What should I bring: Bring a list of your medicines, including vitamins, minerals and over-the-counter drugs. It is safest to leave personal items at home.  Do I need a :  No  is needed.  What do I need to tell my doctor: Tell your doctor about any allergies you may have.  What should I do after the exam: No restrictions, You may resume normal activities.  What is this test: An ultrasound uses sound waves to make pictures of the body. Sound waves do not cause pain. The only discomfort may be the pressure of the wand against your skin or full bladder.  Who should I call with questions: If you have any questions, please call the Imaging Department where you will have your exam. Directions, parking instructions, and other information is available on our website, [email or web address].            Nov 05, 2018  1:00 PM CST   Lab with  LAB   [facility])    [address] [place] [address]  1st Floor  [facility] [id number] [phone]            Nov 05, 2018  2:00 PM CST   (Arrive by 1:45 PM)   Return General Liver with Chapo Mendez MD   [facility] ([facility])    [address] [place] [address]  [address]  [facility] [id number] [phone]            Jul 15, 2019  1:00 PM CDT   Return Visit with SHAUNA Govea CNP   [facility] ([facility])    [address] [place] [id number] [phone]              Additional Services     GASTROENTEROLOGY ADULT REF PROCEDURE ONLY       Last Lab Result: Creatinine (mg/dL)       Date                     Value                 11/01/2018               0.97             ----------  There is no height or weight on file to calculate BMI.     Needed:  No  Language:  English    Patient  will be contacted to schedule procedure.     Please be aware that coverage of these services is subject to the terms and limitations of your health insurance plan.  Call member services at your health plan with any benefit or coverage questions.  Any procedures must be performed at a [place] facility OR coordinated by your clinic's referral office.    Please bring the following with you to your appointment:    (1) Any X-Rays, CTs or MRIs which have been performed.  Contact the facility where they were done to arrange for  prior to your scheduled appointment.    (2) List of current medications   (3) This referral request   (4) Any documents/labs given to you for this referral                  Further instructions from your care team       Discharge Instructions after  Upper Endoscopy (EGD)    Activity and Diet  You were given medicine for pain. You may be dizzy or sleepy.  For 24 hours:    Do not drive or use heavy equipment.    Do not make important decisions.    Do not drink any alcohol.    Discomfort  You may have a sore throat for 2 to 3 days. It may help to:    Avoid hot liquids for 24 hours.    Use sore throat lozenges.    Gargle as needed with salt water up to 4 times a day. Mix 1 cup of warm water  with 1 teaspoon of salt. Do not swallow.    Your esophagus was dilated (opened) or banded during the exam:    Drink only cool liquids for the rest of the day. Eat a soft diet for the next few days.    You may have a sore chest for 2 to 3 days.    You may take Tylenol (acetaminophen) for pain unless your doctor has told you not to.    Do not take aspirin or ibuprofen (Advil, Motrin) or other NSAIDS  (anti-inflammatory drugs)      Follow-up    Keep all appointments as scheduled including laboratory appointments      When to call us:  Problems are rare. Call right away if you have:    Unusual throat pain or trouble swallowing    Unusual pain in belly or chest that is not relieved by belching or passing air     "Black stools (tar-like looking bowel movement)    Temperature above 100.6  F. (37.5  C).    If you vomit blood or have severe pain, go to an emergency room.    If you have questions, call:  Monday to Friday, 7 a.m. to 4:30 p.m.: Endoscopy: [phone] (We may have to call you back)    After hours: Hospital: [phone] (Ask for the GI fellow on call)    Pending Results     No orders found from 10/30/2018 to 2018.            Admission Information     Date & Time Provider Department Dept. Phone    2018 Chapo Vargas MD [facility] [place], Endoscopy [phone]      Your Vitals Were     Blood Pressure Temperature Respirations Pulse Oximetry          109/60 97.7  F (36.5  C) 18 94%        MyChart Information     [facility] lets you send messages to your doctor, view your test results, renew your prescriptions, schedule appointments and more. To sign up, go to [email or web address] . Click on \"Log in\" on the left side of the screen, which will take you to the Welcome page. Then click on \"Sign up Now\" on the right side of the page.     You will be asked to enter the access code listed below, as well as some personal information. Please follow the directions to create your username and password.     Your access code is: [email or web address]  Expires: 2019  3:22 PM     Your access code will  in 90 days. If you need help or a new code, please call your [place] clinic or [phone].        Care EveryWhere ID     This is your Care EveryWhere ID. This could be used by other organizations to access your [facility] medical records  [id number]        Equal Access to Services     CANDACE BENITEZ : Olvin Luis, basilia styles, kaia salinas, grace keene So [facility] [phone].    ATENCIÓN: Si habla español, tiene a hayes disposición servicios gratuitos de asistencia lingüística. Alee al [phone].    We comply with applicable federal civil rights laws " and [place] laws. We do not discriminate on the basis of race, color, national origin, age, disability, sex, sexual orientation, or gender identity.               Review of your medicines      UNREVIEWED medicines. Ask your doctor about these medicines        Dose / Directions    ACIDOPHILUS PO        Dose:  1 capsule   Take 1 capsule by mouth daily   Refills:  0       ferrous sulfate 325 (65 Fe) MG tablet   Commonly known as:  IRON   Used for:  Pancytopenia (H), Endometrial cancer (H), Excessive or frequent menstruation        Dose:  325 mg   Take 1 tablet (325 mg) by mouth daily   Quantity:  30 tablet   Refills:  0       furosemide 40 MG tablet   Commonly known as:  LASIX   Used for:  Hepatic cirrhosis, unspecified hepatic cirrhosis type (H)        Dose:  80 mg   Take 2 tablets (80 mg) by mouth daily   Quantity:  180 tablet   Refills:  3       GABAPENTIN PO        Dose:  1200 mg   Take 1,200 mg by mouth 3 times daily 2 x 600 mg tab   Refills:  0       HYDROmorphone 2 MG tablet   Commonly known as:  DILAUDID   Used for:  Endometrial cancer (H)        Dose:  2-4 mg   Take 1-2 tablets (2-4 mg) by mouth every 6 hours as needed for moderate to severe pain   Quantity:  10 tablet   Refills:  0       KLOR-CON 8 MEQ CR tablet   Generic drug:  potassium chloride        Dose:  16 mEq   Take 16 mEq by mouth 2 times daily AM and PM in addition to afternoon dose   Refills:  0       LEVEMIR FLEXPEN/FLEXTOUCH 100 UNIT/ML injection   Generic drug:  insulin detemir        Dose:  42 Units   Inject 42 Units Subcutaneous 2 times daily   Refills:  0       LEVOTHYROXINE SODIUM PO        Dose:  50 mcg   Take 50 mcg by mouth daily   Refills:  0       METFORMIN HCL PO        Dose:  1000 mg   Take 1,000 mg by mouth 2 times daily (with meals)   Refills:  0       nitroFURantoin (macrocrystal-monohydrate) 100 MG capsule   Commonly known as:  MACROBID   Used for:  Acute cystitis without hematuria        Dose:  100 mg   Take 1 capsule (100 mg)  by mouth 2 times daily   Quantity:  14 capsule   Refills:  0       * NOVOLOG SC        Dose:  28 Units   Inject 28 Units Subcutaneous daily (with breakfast)   Refills:  0       * NOVOLOG SC        Dose:  30 Units   Inject 30 Units Subcutaneous daily (with lunch)   Refills:  0       * NOVOLOG SC        Dose:  28 Units   Inject 28 Units Subcutaneous daily (with dinner)   Refills:  0       phenazopyridine 97.5 MG tablet   Commonly known as:  AZO   Used for:  Acute cystitis without hematuria        Dose:  195 mg   Take 2 tablets (195 mg) by mouth 3 times daily   Quantity:  12 tablet   Refills:  0       polyethylene glycol powder   Commonly known as:  MIRALAX   Used for:  Hepatic cirrhosis, unspecified hepatic cirrhosis type (H)        Take one to two capfuls a day by mouth. Adjust dose so having 2-3 bowel movements a day.   Quantity:  238 g   Refills:  3       senna-docusate 8.6-50 MG per tablet   Commonly known as:  SENOKOT-S;PERICOLACE   Used for:  Endometrial cancer (H)        Dose:  2 tablet   Take 2 tablets by mouth 2 times daily as needed for constipation   Quantity:  100 tablet   Refills:  0       Simethicone 125 MG Caps   Used for:  Abdominal distension (gaseous)        Dose:  125 mg   Take 125 mg by mouth 2 times daily as needed   Quantity:  28 capsule   Refills:  0       spironolactone 100 MG tablet   Commonly known as:  ALDACTONE   Used for:  Hepatic cirrhosis, unspecified hepatic cirrhosis type (H)        Dose:  200 mg   Take 2 tablets (200 mg) by mouth daily   Quantity:  180 tablet   Refills:  3       sulfamethoxazole-trimethoprim 800-160 MG per tablet   Commonly known as:  BACTRIM DS/SEPTRA DS   Indication:  History of Osteomyelitis.        Dose:  1 tablet   Take 1 tablet by mouth 2 times daily   Refills:  0       vitamin B complex with vitamin C Tabs tablet   Commonly known as:  STRESS TAB        Dose:  1 tablet   Take 1 tablet by mouth daily.   Refills:  0       * Notice:  This list has 3 medication(s)  that are the same as other medications prescribed for you. Read the directions carefully, and ask your doctor or other care provider to review them with you.      CONTINUE these medicines which have NOT CHANGED        Dose / Directions    order for DME   Used for:  S/P hysterectomy        Equipment being ordered: Ostomy supplies,Misc [place] ostomy drainable pouches. #[id number]   Quantity:  10 each   Refills:  1                Protect others around you: Learn how to safely use, store and throw away your medicines at www.disposemymeds.org.             Medication List: This is a list of all your medications and when to take them. Check marks below indicate your daily home schedule. Keep this list as a reference.      Medications           Morning Afternoon Evening Bedtime As Needed    ACIDOPHILUS PO   Take 1 capsule by mouth daily                                ferrous sulfate 325 (65 Fe) MG tablet   Commonly known as:  IRON   Take 1 tablet (325 mg) by mouth daily                                furosemide 40 MG tablet   Commonly known as:  LASIX   Take 2 tablets (80 mg) by mouth daily                                GABAPENTIN PO   Take 1,200 mg by mouth 3 times daily 2 x 600 mg tab                                HYDROmorphone 2 MG tablet   Commonly known as:  DILAUDID   Take 1-2 tablets (2-4 mg) by mouth every 6 hours as needed for moderate to severe pain                                KLOR-CON 8 MEQ CR tablet   Take 16 mEq by mouth 2 times daily AM and PM in addition to afternoon dose   Generic drug:  potassium chloride                                LEVEMIR FLEXPEN/FLEXTOUCH 100 UNIT/ML injection   Inject 42 Units Subcutaneous 2 times daily   Generic drug:  insulin detemir                                LEVOTHYROXINE SODIUM PO   Take 50 mcg by mouth daily                                METFORMIN HCL PO   Take 1,000 mg by mouth 2 times daily (with meals)                                nitroFURantoin  (macrocrystal-monohydrate) 100 MG capsule   Commonly known as:  MACROBID   Take 1 capsule (100 mg) by mouth 2 times daily                                * NOVOLOG SC   Inject 28 Units Subcutaneous daily (with breakfast)                                * NOVOLOG SC   Inject 30 Units Subcutaneous daily (with lunch)                                * NOVOLOG SC   Inject 28 Units Subcutaneous daily (with dinner)                                order for DME   Equipment being ordered: Ostomy supplies,Misc [place] ostomy drainable pouches. #[id number]                                phenazopyridine 97.5 MG tablet   Commonly known as:  AZO   Take 2 tablets (195 mg) by mouth 3 times daily                                polyethylene glycol powder   Commonly known as:  MIRALAX   Take one to two capfuls a day by mouth. Adjust dose so having 2-3 bowel movements a day.                                senna-docusate 8.6-50 MG per tablet   Commonly known as:  SENOKOT-S;PERICOLACE   Take 2 tablets by mouth 2 times daily as needed for constipation                                Simethicone 125 MG Caps   Take 125 mg by mouth 2 times daily as needed                                spironolactone 100 MG tablet   Commonly known as:  ALDACTONE   Take 2 tablets (200 mg) by mouth daily                                sulfamethoxazole-trimethoprim 800-160 MG per tablet   Commonly known as:  BACTRIM DS/SEPTRA DS   Take 1 tablet by mouth 2 times daily                                vitamin B complex with vitamin C Tabs tablet   Commonly known as:  STRESS TAB   Take 1 tablet by mouth daily.                                * Notice:  This list has 3 medication(s) that are the same as other medications prescribed for you. Read the directions carefully, and ask your doctor or other care provider to review them with you.

## 2018-11-02 ENCOUNTER — TELEPHONE (OUTPATIENT)
Dept: GASTROENTEROLOGY | Facility: CLINIC | Age: 63
End: 2018-11-02

## 2018-11-05 ENCOUNTER — CARE COORDINATION (OUTPATIENT)
Dept: GASTROENTEROLOGY | Facility: CLINIC | Age: 63
End: 2018-11-05

## 2018-11-05 NOTE — PROGRESS NOTES
Patient's appointments were cancelled today in error and she was unable to be seen. She rescheduled to December and will have labs and ultrasound at that time. She has concerns of throat and jaw still having soreness after her EGD last week. She states she cannot eat for about an hour after taking medications because of this pain. She states it is not like a sore throat or scratchy throat, but more of an extreme aching. She does not feel it has gotten any worse, but not any better yet either. She will continue to eat soft foods and warm or cool liquids, and see how it does the next few days.

## 2018-12-10 ENCOUNTER — ANCILLARY PROCEDURE (OUTPATIENT)
Dept: ULTRASOUND IMAGING | Facility: CLINIC | Age: 63
End: 2018-12-10
Attending: PHYSICIAN ASSISTANT
Payer: COMMERCIAL

## 2018-12-10 ENCOUNTER — OFFICE VISIT (OUTPATIENT)
Dept: GASTROENTEROLOGY | Facility: CLINIC | Age: 63
End: 2018-12-10
Attending: INTERNAL MEDICINE
Payer: COMMERCIAL

## 2018-12-10 VITALS
BODY MASS INDEX: 38.69 KG/M2 | SYSTOLIC BLOOD PRESSURE: 97 MMHG | TEMPERATURE: 97 F | HEIGHT: 69 IN | OXYGEN SATURATION: 97 % | DIASTOLIC BLOOD PRESSURE: 63 MMHG | HEART RATE: 98 BPM

## 2018-12-10 DIAGNOSIS — K74.60 HEPATIC CIRRHOSIS, UNSPECIFIED HEPATIC CIRRHOSIS TYPE (H): ICD-10-CM

## 2018-12-10 DIAGNOSIS — K74.60 LIVER CIRRHOSIS SECONDARY TO NASH (H): Primary | ICD-10-CM

## 2018-12-10 DIAGNOSIS — K75.81 LIVER CIRRHOSIS SECONDARY TO NASH (H): Primary | ICD-10-CM

## 2018-12-10 LAB
ALBUMIN SERPL-MCNC: 3.5 G/DL (ref 3.4–5)
ALP SERPL-CCNC: 87 U/L (ref 40–150)
ALT SERPL W P-5'-P-CCNC: 39 U/L (ref 0–50)
AST SERPL W P-5'-P-CCNC: 43 U/L (ref 0–45)
BILIRUB DIRECT SERPL-MCNC: 0.3 MG/DL (ref 0–0.2)
BILIRUB SERPL-MCNC: 0.7 MG/DL (ref 0.2–1.3)
ERYTHROCYTE [DISTWIDTH] IN BLOOD BY AUTOMATED COUNT: 14.3 % (ref 10–15)
HCT VFR BLD AUTO: 38 % (ref 35–47)
HGB BLD-MCNC: 13 G/DL (ref 11.7–15.7)
INR PPP: 1.1 (ref 0.86–1.14)
MCH RBC QN AUTO: 33.7 PG (ref 26.5–33)
MCHC RBC AUTO-ENTMCNC: 34.2 G/DL (ref 31.5–36.5)
MCV RBC AUTO: 98 FL (ref 78–100)
PLATELET # BLD AUTO: 50 10E9/L (ref 150–450)
PROT SERPL-MCNC: 7.7 G/DL (ref 6.8–8.8)
RBC # BLD AUTO: 3.86 10E12/L (ref 3.8–5.2)
WBC # BLD AUTO: 5.1 10E9/L (ref 4–11)

## 2018-12-10 PROCEDURE — 80076 HEPATIC FUNCTION PANEL: CPT | Performed by: INTERNAL MEDICINE

## 2018-12-10 PROCEDURE — 85610 PROTHROMBIN TIME: CPT | Performed by: INTERNAL MEDICINE

## 2018-12-10 PROCEDURE — G0463 HOSPITAL OUTPT CLINIC VISIT: HCPCS | Mod: ZF

## 2018-12-10 PROCEDURE — 36415 COLL VENOUS BLD VENIPUNCTURE: CPT | Performed by: INTERNAL MEDICINE

## 2018-12-10 PROCEDURE — 85027 COMPLETE CBC AUTOMATED: CPT | Performed by: INTERNAL MEDICINE

## 2018-12-10 ASSESSMENT — PAIN SCALES - GENERAL: PAINLEVEL: NO PAIN (0)

## 2018-12-10 NOTE — PROGRESS NOTES
[facility]    Hepatology follow-up    Follow-up visit for cirrhosis    Subjective:  63 year old female    Cirrhosis  - dx Aug 2018  - ?SIN  - hx ascites  - no hx HE or variceal bleed  - last EGD 11/1/18- medium EV with bandx2, mild portal hypertensive gastropathy  - HCC screening- CT C/A/P July 2018    Patient comes to clinic this afternoon with her  for follow-up of cirrhosis.  Last clinic visit with PA Sep 2018.  EGD in Nov 2018 showed 2 medium-sized varices requiring banding.  Follow-up EGD planned for 12/20/18.  No new medications, ER visits or hospital admissions since last clinic visit.    Patient is well today.  She denies abdominal distension or lower extremity edema.  She continues to take diuretics.  She did not need paracentesis in the end.    Patient denies jaundice, lethargy or confusion.    Patient denies melena, hematemesis or hematochezia.    Patient denies fevers, sweats or chills.      Weight stable since last clinic visit.    Patient does not drink alcohol.  She continues to smoke.  She is hoping to go to [facility] at the end of the month.      Medical hx Surgical hx   Past Medical History:   Diagnosis Date     Charcot foot due to diabetes mellitus (H)      COPD (chronic obstructive pulmonary disease) (H)      Depression      Diabetes mellitus (H)      Diabetic neuropathy (H)      Endometrial cancer (H)      Hypothyroid      Iron deficiency anemia      Obesity      USHA (obstructive sleep apnea)      Osteomyelitis (H)      Tobacco abuse       Past Surgical History:   Procedure Laterality Date     AMPUTATE LEG BELOW KNEE  4/14/2012    Procedure:AMPUTATE LEG BELOW KNEE; right leg below knee amputation; Surgeon:WILMAR LUI; Location:[facility]     AMPUTATE TOE(S)  5/1/2013    Procedure: AMPUTATE TOE(S);  PARTIAL LEFT 3RD TOE AMPUTATION;  Surgeon: Juancarlos Gamez DPM;  Location: [facility] OR     AMPUTATION      (L) 2nd toe     ANESTHESIA OUT OF OR MRI  6/11/2013     Procedure: ANESTHESIA OUT OF OR MRI;  MRI UNDER GENERAL ANESTHESIA;  Surgeon: Provider, Generic Anesthesia;  Location:  OR     ANESTHESIA OUT OF OR MRI  9/4/2013    Procedure: ANESTHESIA OUT OF OR MRI;  ANESTHESIA OUT OF OR MRI;  Surgeon: Provider, Generic Anesthesia;  Location:  OR     BONE MARROW BIOPSY, BONE SPECIMEN, NEEDLE/TROCAR N/A 6/7/2018    Procedure: BIOPSY BONE MARROW;  bone marrow biopsy;  Surgeon: Farzana Teixeira MD;  Location:  GI     CYSTOSCOPY N/A 8/8/2018    Procedure: CYSTOSCOPY;;  Surgeon: Trudy Cortze MD;  [facility]     DAVINCI HYSTERECTOMY TOTAL, BILATERAL SALPINGO-OOPHORECTOMY, COMBINED N/A 8/8/2018    Procedure: COMBINED DAVINCI HYSTERECTOMY TOTAL, SALPINGO-OOPHORECTOMY;  DaVinci Assisted Total Laparoscopic Hysterectomy, Bilateral Salpingo-Oophorectomy, Cystoscopy, Bilateral Pelvic Lymph Node Dissection;  Surgeon: Trudy Cortez MD;  [facility]     DILATION AND CURETTAGE N/A 6/6/2018    Procedure: DILATION AND CURETTAGE;  DILATION AND CURETTAGE ;  Surgeon: Tip Ortega MD;  Location:  OR     EXPLORE SPINE, REMOVE HARDWARE, COMBINED  10/11/2013    Procedure: COMBINED EXPLORE SPINE, REMOVE HARDWARE;  EXPLORATION AND REVISION POSTERIOR THORACIC WOUND WITH WOUND VAC PLACEMENT.;  Surgeon: Thomas Ellis MD;  Location:  OR     FUSION SPINE ANTERIOR THORACIC ONE LEVEL  9/5/2013    Procedure: FUSION SPINE ANTERIOR THORACIC ONE LEVEL;  Right Thoracotomy For  T7-T8 Thorasic Vertebral Body Resection with Strut Graft, [place] Instrumentation T6-T9, BMP, Pyramesh and Intra-operative Neuro Monitoring ;  Surgeon: Tohmas Ellis MD;  Location:  OR     HYSTERECTOMY RADICAL, SALPINGO-OOPHORECTOMY, NODE DISSECTION  08/08/2018    DaVinci assisted TLH, BSO, BPLND; Surgeon: Trudy Cortez MD     INCISION AND DRAINAGE RECTUM, COMBINED  6/11/2012    Procedure: COMBINED INCISION AND DRAINAGE RECTUM;  I&D ABSCESS RIGHT BUTTOCK (MRSA );  Surgeon: Royer  Christos Kohli MD;  [facility]: [facility]     INCISION AND DRAINAGE SPINE, CLOSE WOUND, COMBINED  10/14/2013    Procedure: COMBINED INCISION AND DRAINAGE SPINE, CLOSE WOUND;  EXPLORATION/REVISION POSTERIOR THORACIC WOUND COMPLEX 15 CM. ;  Surgeon: Thomas Ellis MD;  [facility]: [facility]     IRRIGATION AND DEBRIDEMENT TRUNK, COMBINED  6/10/2012    Procedure: COMBINED IRRIGATION AND DEBRIDEMENT TRUNK;  Incision and drainage abscess right buttock;  Surgeon: Christos Linton MD;  [facility]: [facility]     OPTICAL TRACKING SYSTEM FUSION POSTERIOR SPINE THORACIC THREE+ LEVELS  9/6/2013    Procedure: OPTICAL TRACKING SYSTEM FUSION POSTERIOR SPINE THORACIC THREE+ LEVELS;  T4-T11 POSTERIOR LATERAL PEDICLE SCREW INSTRUMENTATION WITH IMAGE GUIDANCE AND NEURO-MONITORING;  Surgeon: Thomas Ellis MD;  Location: [facility] OR          Medications  Prior to Admission medications    Medication Sig Start Date End Date Taking? Authorizing Provider   B Complex Vitamins (VITAMIN  B COMPLEX) tablet Take 1 tablet by mouth daily.   Yes Reported, Patient   ferrous sulfate (IRON) 325 (65 Fe) MG tablet Take 1 tablet (325 mg) by mouth daily 8/10/18  Yes Marielle Garibay APRN CNP   furosemide (LASIX) 40 MG tablet Take 2 tablets (80 mg) by mouth daily 10/4/18  Yes Chapo Vargas MD   GABAPENTIN PO Take 1,200 mg by mouth 3 times daily 2 x 600 mg tab   Yes Unknown, Entered By History   HYDROmorphone (DILAUDID) 2 MG tablet Take 1-2 tablets (2-4 mg) by mouth every 6 hours as needed for moderate to severe pain 8/10/18  Yes Marielle Garibay APRN CNP   Insulin Aspart (NOVOLOG SC) Inject 28 Units Subcutaneous daily (with breakfast)   Yes Unknown, Entered By History   Insulin Aspart (NOVOLOG SC) Inject 30 Units Subcutaneous daily (with lunch)   Yes Unknown, Entered By History   Insulin Aspart (NOVOLOG SC) Inject 28 Units Subcutaneous daily (with dinner)   Yes Unknown, Entered By History   insulin detemir (LEVEMIR FLEXPEN/FLEXTOUCH) 100  UNIT/ML injection Inject 42 Units Subcutaneous 2 times daily   Yes Unknown, Entered By History   Lactobacillus (ACIDOPHILUS PO) Take 1 capsule by mouth daily   Yes Unknown, Entered By History   LEVOTHYROXINE SODIUM PO Take 50 mcg by mouth daily    Yes Reported, Patient   METFORMIN HCL PO Take 1,000 mg by mouth 2 times daily (with meals)   Yes Unknown, Entered By History   nitroFURantoin, macrocrystal-monohydrate, (MACROBID) 100 MG capsule Take 1 capsule (100 mg) by mouth 2 times daily 8/11/18  Yes Lewis Segal MD   order for DME Equipment being ordered: Ostomy supplies,[facility]  Carmen ostomy drainable pouches. #[id number] 9/4/18  Yes Sakina Berger MD   phenazopyridine (AZO) 97.5 MG tablet Take 2 tablets (195 mg) by mouth 3 times daily 8/11/18  Yes Lewis Segal MD   polyethylene glycol (MIRALAX) powder Take one to two capfuls a day by mouth. Adjust dose so having 2-3 bowel movements a day. 9/24/18  Yes Moon Carpenter PA-C   potassium chloride (KLOR-CON) 8 MEQ CR tablet Take 16 mEq by mouth 2 times daily AM and PM in addition to afternoon dose   Yes Unknown, Entered By History   senna-docusate (SENOKOT-S;PERICOLACE) 8.6-50 MG per tablet Take 2 tablets by mouth 2 times daily as needed for constipation 8/10/18  Yes Marielle Garibay APRN CNP   Simethicone 125 MG CAPS Take 125 mg by mouth 2 times daily as needed 8/11/18  Yes Lewis Segal MD   spironolactone (ALDACTONE) 100 MG tablet Take 2 tablets (200 mg) by mouth daily 10/4/18  Yes Chapo Vargas MD   sulfamethoxazole-trimethoprim (BACTRIM DS/SEPTRA DS) 800-160 MG per tablet Take 1 tablet by mouth 2 times daily   Yes Unknown, Entered By History       Allergies  Allergies   Allergen Reactions     Adhesive Tape      Cephalosporins Anaphylaxis     Naproxen Anaphylaxis     Penicillins      Augmentin Rash     Benadryl [Anti-Itch] Rash     Morphine Hcl Itching and Rash       Review of systems  A 10-point review  "of systems was negative    Examination  BP 97/63   Pulse 98   Temp 97  F (36.1  C) (Oral)   Ht 1.753 m (5' 9\")   SpO2 97%   BMI 38.69 kg/m    Body mass index is 38.69 kg/m .    Gen- well, NAD, A+Ox3, normal color  CVS- RRR  RS- CTA  Abd- obese, healed laparoscopy wounds, SNT, no obvious ascites or organomegaly on palpation or percussion, BS+  Extr- R BKA, hands normal, no ANNY  Skin- no rash or jaundice  Neuro- no asterixis  Psych- normal mood    Laboratory  Lab Results   Component Value Date     11/01/2018    POTASSIUM 5.2 11/01/2018    CHLORIDE 96 11/01/2018    CO2 23 11/01/2018    BUN 24 11/01/2018    CR 0.97 11/01/2018       Lab Results   Component Value Date    BILITOTAL 0.7 09/24/2018    ALT 32 09/24/2018    AST 40 09/24/2018    ALKPHOS 93 09/24/2018       Lab Results   Component Value Date    ALBUMIN 3.6 09/24/2018    PROTTOTAL 8.0 09/24/2018        Lab Results   Component Value Date    WBC 4.3 09/24/2018    HGB 12.7 09/24/2018    MCV 94 09/24/2018    PLT 62 09/24/2018       Lab Results   Component Value Date    INR 1.09 09/24/2018       Radiology  EGD Nov 2018 personally reviewed    Assessment  63 year old female who presents for routine follow-up of decompensated SIN cirrhosis complicated with ascites.  MELD-Na= 7 (stable).  Ascites resolved on current diuretics- will taper down if no evidence of ascites on ultrasound today.  No evidence of hepatic encephalopathy.  Due for EGD later this month for surveillance of esophageal varices.  Due for HCC screening.    Plan  1.  Decrease diuretics if no ascites on U/S  2.  Low Na diet  3.  EGD as scheduled  4.  Abd U/S as scheduled  5.  Smoking cessation  6.  Follow-up with PA in 6 months    Chapo Stinson MD  Hepatology  [facility]  "

## 2018-12-10 NOTE — LETTER
12/10/2018       RE: Fani Escobedo  [address] [place] [address]     Dear Colleague,    Thank you for referring your patient, Fani Escobedo, to the [facility]PATOLOGY at [facility]. Please see a copy of my visit note below.    [facility]    Hepatology follow-up    Follow-up visit for cirrhosis    Subjective:  63 year old female    Cirrhosis  - dx Aug 2018  - ?SIN  - hx ascites  - no hx HE or variceal bleed  - last EGD 11/1/18- medium EV with bandx2, mild portal hypertensive gastropathy  - HCC screening- CT C/A/P July 2018    Patient comes to clinic this afternoon with her  for follow-up of cirrhosis.  Last clinic visit with PA Sep 2018.  EGD in Nov 2018 showed 2 medium-sized varices requiring banding.  Follow-up EGD planned for 12/20/18.  No new medications, ER visits or hospital admissions since last clinic visit.    Patient is well today.  She denies abdominal distension or lower extremity edema.  She continues to take diuretics.  She did not need paracentesis in the end.    Patient denies jaundice, lethargy or confusion.    Patient denies melena, hematemesis or hematochezia.    Patient denies fevers, sweats or chills.      Weight stable since last clinic visit.    Patient does not drink alcohol.  She continues to smoke.  She is hoping to go to [facility] at the end of the month.      Medical hx Surgical hx   Past Medical History:   Diagnosis Date     Charcot foot due to diabetes mellitus (H)      COPD (chronic obstructive pulmonary disease) (H)      Depression      Diabetes mellitus (H)      Diabetic neuropathy (H)      Endometrial cancer (H)      Hypothyroid      Iron deficiency anemia      Obesity      USHA (obstructive sleep apnea)      Osteomyelitis (H)      Tobacco abuse       Past Surgical History:   Procedure Laterality Date     AMPUTATE LEG BELOW KNEE  4/14/2012    Procedure:AMPUTATE LEG BELOW KNEE; right leg below knee  amputation; Surgeon:WILMAR LUI; Location: OR     AMPUTATE TOE(S)  5/1/2013    Procedure: AMPUTATE TOE(S);  PARTIAL LEFT 3RD TOE AMPUTATION;  Surgeon: Juancarlos Gamez DPM;  Location:  OR     AMPUTATION      (L) 2nd toe     ANESTHESIA OUT OF OR MRI  6/11/2013    Procedure: ANESTHESIA OUT OF OR MRI;  MRI UNDER GENERAL ANESTHESIA;  Surgeon: Provider, Generic Anesthesia;  Location:  OR     ANESTHESIA OUT OF OR MRI  9/4/2013    Procedure: ANESTHESIA OUT OF OR MRI;  ANESTHESIA OUT OF OR MRI;  Surgeon: Provider, Generic Anesthesia;  Location:  OR     BONE MARROW BIOPSY, BONE SPECIMEN, NEEDLE/TROCAR N/A 6/7/2018    Procedure: BIOPSY BONE MARROW;  bone marrow biopsy;  Surgeon: Farzana Teixeira MD;  Location:  GI     CYSTOSCOPY N/A 8/8/2018    Procedure: CYSTOSCOPY;;  Surgeon: Trudy Cortez MD;  [facility]     DAVINCI HYSTERECTOMY TOTAL, BILATERAL SALPINGO-OOPHORECTOMY, COMBINED N/A 8/8/2018    Procedure: COMBINED DAVINCI HYSTERECTOMY TOTAL, SALPINGO-OOPHORECTOMY;  DaVinci Assisted Total Laparoscopic Hysterectomy, Bilateral Salpingo-Oophorectomy, Cystoscopy, Bilateral Pelvic Lymph Node Dissection;  Surgeon: Trudy Cortez MD;  [facility]     DILATION AND CURETTAGE N/A 6/6/2018    Procedure: DILATION AND CURETTAGE;  DILATION AND CURETTAGE ;  Surgeon: Tip Ortega MD;  Location:  OR     EXPLORE SPINE, REMOVE HARDWARE, COMBINED  10/11/2013    Procedure: COMBINED EXPLORE SPINE, REMOVE HARDWARE;  EXPLORATION AND REVISION POSTERIOR THORACIC WOUND WITH WOUND VAC PLACEMENT.;  Surgeon: Thomas Ellis MD;  Location:  OR     FUSION SPINE ANTERIOR THORACIC ONE LEVEL  9/5/2013    Procedure: FUSION SPINE ANTERIOR THORACIC ONE LEVEL;  Right Thoracotomy For  T7-T8 Thorasic Vertebral Body Resection with Strut Graft, [place] Instrumentation T6-T9, BMP, Pyramesh and Intra-operative Neuro Monitoring ;  Surgeon: Thomas Ellis MD;  Location:  OR     HYSTERECTOMY RADICAL,  SALPINGO-OOPHORECTOMY, NODE DISSECTION  08/08/2018    DaVinci assisted TLH, BSO, BPLND; Surgeon: Trudy Cortez MD     INCISION AND DRAINAGE RECTUM, COMBINED  6/11/2012    Procedure: COMBINED INCISION AND DRAINAGE RECTUM;  I&D ABSCESS RIGHT BUTTOCK (MRSA );  Surgeon: Christos Linton MD;  Location: [facility] OR     INCISION AND DRAINAGE SPINE, CLOSE WOUND, COMBINED  10/14/2013    Procedure: COMBINED INCISION AND DRAINAGE SPINE, CLOSE WOUND;  EXPLORATION/REVISION POSTERIOR THORACIC WOUND COMPLEX 15 CM. ;  Surgeon: Thomas Ellis MD;  Location: [facility]     IRRIGATION AND DEBRIDEMENT TRUNK, COMBINED  6/10/2012    Procedure: COMBINED IRRIGATION AND DEBRIDEMENT TRUNK;  Incision and drainage abscess right buttock;  Surgeon: Christos Linton MD;  Location: [facility] OR     OPTICAL TRACKING SYSTEM FUSION POSTERIOR SPINE THORACIC THREE+ LEVELS  9/6/2013    Procedure: OPTICAL TRACKING SYSTEM FUSION POSTERIOR SPINE THORACIC THREE+ LEVELS;  T4-T11 POSTERIOR LATERAL PEDICLE SCREW INSTRUMENTATION WITH IMAGE GUIDANCE AND NEURO-MONITORING;  Surgeon: Thomas Ellis MD;  Location: [facility] OR          Medications  Prior to Admission medications    Medication Sig Start Date End Date Taking? Authorizing Provider   B Complex Vitamins (VITAMIN  B COMPLEX) tablet Take 1 tablet by mouth daily.   Yes Reported, Patient   ferrous sulfate (IRON) 325 (65 Fe) MG tablet Take 1 tablet (325 mg) by mouth daily 8/10/18  Yes Marielle Garibay APRN CNP   furosemide (LASIX) 40 MG tablet Take 2 tablets (80 mg) by mouth daily 10/4/18  Yes Chapo Vargas MD   GABAPENTIN PO Take 1,200 mg by mouth 3 times daily 2 x 600 mg tab   Yes Unknown, Entered By History   HYDROmorphone (DILAUDID) 2 MG tablet Take 1-2 tablets (2-4 mg) by mouth every 6 hours as needed for moderate to severe pain 8/10/18  Yes Marielle Garibay APRN CNP   Insulin Aspart (NOVOLOG SC) Inject 28 Units Subcutaneous daily (with breakfast)   Yes Unknown, Entered By History    Insulin Aspart (NOVOLOG SC) Inject 30 Units Subcutaneous daily (with lunch)   Yes Unknown, Entered By History   Insulin Aspart (NOVOLOG SC) Inject 28 Units Subcutaneous daily (with dinner)   Yes Unknown, Entered By History   insulin detemir (LEVEMIR FLEXPEN/FLEXTOUCH) 100 UNIT/ML injection Inject 42 Units Subcutaneous 2 times daily   Yes Unknown, Entered By History   Lactobacillus (ACIDOPHILUS PO) Take 1 capsule by mouth daily   Yes Unknown, Entered By History   LEVOTHYROXINE SODIUM PO Take 50 mcg by mouth daily    Yes Reported, Patient   METFORMIN HCL PO Take 1,000 mg by mouth 2 times daily (with meals)   Yes Unknown, Entered By History   nitroFURantoin, macrocrystal-monohydrate, (MACROBID) 100 MG capsule Take 1 capsule (100 mg) by mouth 2 times daily 8/11/18  Yes Lewis Segal MD   order for DME Equipment being ordered: Ostomy supplies,Misc  [employer] ostomy drainable pouches. #[id number] 9/4/18  Yes Sakina Berger MD   phenazopyridine (AZO) 97.5 MG tablet Take 2 tablets (195 mg) by mouth 3 times daily 8/11/18  Yes Lewis Segal MD   polyethylene glycol (MIRALAX) powder Take one to two capfuls a day by mouth. Adjust dose so having 2-3 bowel movements a day. 9/24/18  Yes Moon Carpenter PA-C   potassium chloride (KLOR-CON) 8 MEQ CR tablet Take 16 mEq by mouth 2 times daily AM and PM in addition to afternoon dose   Yes Unknown, Entered By History   senna-docusate (SENOKOT-S;PERICOLACE) 8.6-50 MG per tablet Take 2 tablets by mouth 2 times daily as needed for constipation 8/10/18  Yes Marielle Garibay APRN CNP   Simethicone 125 MG CAPS Take 125 mg by mouth 2 times daily as needed 8/11/18  Yes Lewis Segal MD   spironolactone (ALDACTONE) 100 MG tablet Take 2 tablets (200 mg) by mouth daily 10/4/18  Yes Chapo Vargas MD   sulfamethoxazole-trimethoprim (BACTRIM DS/SEPTRA DS) 800-160 MG per tablet Take 1 tablet by mouth 2 times daily   Yes Unknown, Entered By  "History       Allergies  Allergies   Allergen Reactions     Adhesive Tape      Cephalosporins Anaphylaxis     Naproxen Anaphylaxis     Penicillins      Augmentin Rash     Benadryl [Anti-Itch] Rash     Morphine Hcl Itching and Rash       Review of systems  A 10-point review of systems was negative    Examination  BP 97/63   Pulse 98   Temp 97  F (36.1  C) (Oral)   Ht 1.753 m (5' 9\")   SpO2 97%   BMI 38.69 kg/m     Body mass index is 38.69 kg/m .    Gen- well, NAD, A+Ox3, normal color  CVS- RRR  RS- CTA  Abd- obese, healed laparoscopy wounds, SNT, no obvious ascites or organomegaly on palpation or percussion, BS+  Extr- R BKA, hands normal, no ANNY  Skin- no rash or jaundice  Neuro- no asterixis  Psych- normal mood    Laboratory  Lab Results   Component Value Date     11/01/2018    POTASSIUM 5.2 11/01/2018    CHLORIDE 96 11/01/2018    CO2 23 11/01/2018    BUN 24 11/01/2018    CR 0.97 11/01/2018       Lab Results   Component Value Date    BILITOTAL 0.7 09/24/2018    ALT 32 09/24/2018    AST 40 09/24/2018    ALKPHOS 93 09/24/2018       Lab Results   Component Value Date    ALBUMIN 3.6 09/24/2018    PROTTOTAL 8.0 09/24/2018        Lab Results   Component Value Date    WBC 4.3 09/24/2018    HGB 12.7 09/24/2018    MCV 94 09/24/2018    PLT 62 09/24/2018       Lab Results   Component Value Date    INR 1.09 09/24/2018       Radiology  EGD Nov 2018 personally reviewed    Assessment  63 year old female who presents for routine follow-up of decompensated SIN cirrhosis complicated with ascites.  MELD-Na= 7 (stable).  Ascites resolved on current diuretics- will taper down if no evidence of ascites on ultrasound today.  No evidence of hepatic encephalopathy.  Due for EGD later this month for surveillance of esophageal varices.  Due for HCC screening.    Plan  1.  Decrease diuretics if no ascites on U/S  2.  Low Na diet  3.  EGD as scheduled  4.  Abd U/S as scheduled  5.  Smoking cessation  6.  Follow-up with PA in 6 " months    Chapo Stinson MD  Hepatology  [facility]

## 2018-12-10 NOTE — LETTER
12/10/2018      RE: Fani Escobedo  [address] [place] [address]       [facility]    Hepatology follow-up    Follow-up visit for cirrhosis    Subjective:  63 year old female    Cirrhosis  - dx Aug 2018  - ?SIN  - hx ascites  - no hx HE or variceal bleed  - last EGD 11/1/18- medium EV with bandx2, mild portal hypertensive gastropathy  - HCC screening- CT C/A/P July 2018    Patient comes to clinic this afternoon with her  for follow-up of cirrhosis.  Last clinic visit with PA Sep 2018.  EGD in Nov 2018 showed 2 medium-sized varices requiring banding.  Follow-up EGD planned for 12/20/18.  No new medications, ER visits or hospital admissions since last clinic visit.    Patient is well today.  She denies abdominal distension or lower extremity edema.  She continues to take diuretics.  She did not need paracentesis in the end.    Patient denies jaundice, lethargy or confusion.    Patient denies melena, hematemesis or hematochezia.    Patient denies fevers, sweats or chills.      Weight stable since last clinic visit.    Patient does not drink alcohol.  She continues to smoke.  She is hoping to go to [facility] at the end of the month.      Medical hx Surgical hx   Past Medical History:   Diagnosis Date     Charcot foot due to diabetes mellitus (H)      COPD (chronic obstructive pulmonary disease) (H)      Depression      Diabetes mellitus (H)      Diabetic neuropathy (H)      Endometrial cancer (H)      Hypothyroid      Iron deficiency anemia      Obesity      USHA (obstructive sleep apnea)      Osteomyelitis (H)      Tobacco abuse       Past Surgical History:   Procedure Laterality Date     AMPUTATE LEG BELOW KNEE  4/14/2012    Procedure:AMPUTATE LEG BELOW KNEE; right leg below knee amputation; Surgeon:WILMAR LUI; Location: OR     AMPUTATE TOE(S)  5/1/2013    Procedure: AMPUTATE TOE(S);  PARTIAL LEFT 3RD TOE AMPUTATION;  Surgeon: Juancarlos Gamez DPM;  Location:   OR     AMPUTATION      (L) 2nd toe     ANESTHESIA OUT OF OR MRI  6/11/2013    Procedure: ANESTHESIA OUT OF OR MRI;  MRI UNDER GENERAL ANESTHESIA;  Surgeon: Provider, Generic Anesthesia;  Location:  OR     ANESTHESIA OUT OF OR MRI  9/4/2013    Procedure: ANESTHESIA OUT OF OR MRI;  ANESTHESIA OUT OF OR MRI;  Surgeon: Provider, Generic Anesthesia;  Location:  OR     BONE MARROW BIOPSY, BONE SPECIMEN, NEEDLE/TROCAR N/A 6/7/2018    Procedure: BIOPSY BONE MARROW;  bone marrow biopsy;  Surgeon: Farzana Teixeira MD;  Location:  GI     CYSTOSCOPY N/A 8/8/2018    Procedure: CYSTOSCOPY;;  Surgeon: Trudy Cortez MD;  [facility]: [facility] OR     DAVINCI HYSTERECTOMY TOTAL, BILATERAL SALPINGO-OOPHORECTOMY, COMBINED N/A 8/8/2018    Procedure: COMBINED DAVINCI HYSTERECTOMY TOTAL, SALPINGO-OOPHORECTOMY;  DaVinci Assisted Total Laparoscopic Hysterectomy, Bilateral Salpingo-Oophorectomy, Cystoscopy, Bilateral Pelvic Lymph Node Dissection;  Surgeon: Trudy Cortez MD;  Location: [facility] OR     DILATION AND CURETTAGE N/A 6/6/2018    Procedure: DILATION AND CURETTAGE;  DILATION AND CURETTAGE ;  Surgeon: iTp Ortega MD;  Location:  OR     EXPLORE SPINE, REMOVE HARDWARE, COMBINED  10/11/2013    Procedure: COMBINED EXPLORE SPINE, REMOVE HARDWARE;  EXPLORATION AND REVISION POSTERIOR THORACIC WOUND WITH WOUND VAC PLACEMENT.;  Surgeon: Thomas Ellis MD;  Location:  OR     FUSION SPINE ANTERIOR THORACIC ONE LEVEL  9/5/2013    Procedure: FUSION SPINE ANTERIOR THORACIC ONE LEVEL;  Right Thoracotomy For  T7-T8 Thorasic Vertebral Body Resection with Strut Graft, Pomeroy Instrumentation T6-T9, BMP, Pyramesh and Intra-operative Neuro Monitoring ;  Surgeon: Thomas Ellis MD;  Location:  OR     HYSTERECTOMY RADICAL, SALPINGO-OOPHORECTOMY, NODE DISSECTION  08/08/2018    DaVinci assisted TLH, BSO, BPLND; Surgeon: Trudy Cortez MD     INCISION AND DRAINAGE RECTUM, COMBINED  6/11/2012    Procedure: COMBINED  INCISION AND DRAINAGE RECTUM;  I&D ABSCESS RIGHT BUTTOCK (MRSA );  Surgeon: Christos Linton MD;  Location: [facility] OR     INCISION AND DRAINAGE SPINE, CLOSE WOUND, COMBINED  10/14/2013    Procedure: COMBINED INCISION AND DRAINAGE SPINE, CLOSE WOUND;  EXPLORATION/REVISION POSTERIOR THORACIC WOUND COMPLEX 15 CM. ;  Surgeon: Thomas Ellis MD;  Location: [facility]     IRRIGATION AND DEBRIDEMENT TRUNK, COMBINED  6/10/2012    Procedure: COMBINED IRRIGATION AND DEBRIDEMENT TRUNK;  Incision and drainage abscess right buttock;  Surgeon: Christos Linton MD;  Location: [facility] OR     OPTICAL TRACKING SYSTEM FUSION POSTERIOR SPINE THORACIC THREE+ LEVELS  9/6/2013    Procedure: OPTICAL TRACKING SYSTEM FUSION POSTERIOR SPINE THORACIC THREE+ LEVELS;  T4-T11 POSTERIOR LATERAL PEDICLE SCREW INSTRUMENTATION WITH IMAGE GUIDANCE AND NEURO-MONITORING;  Surgeon: Thomas Ellis MD;  Location: [facility] OR          Medications  Prior to Admission medications    Medication Sig Start Date End Date Taking? Authorizing Provider   B Complex Vitamins (VITAMIN  B COMPLEX) tablet Take 1 tablet by mouth daily.   Yes Reported, Patient   ferrous sulfate (IRON) 325 (65 Fe) MG tablet Take 1 tablet (325 mg) by mouth daily 8/10/18  Yes Marielle Garibay APRN CNP   furosemide (LASIX) 40 MG tablet Take 2 tablets (80 mg) by mouth daily 10/4/18  Yes Chapo Vargas MD   GABAPENTIN PO Take 1,200 mg by mouth 3 times daily 2 x 600 mg tab   Yes Unknown, Entered By History   HYDROmorphone (DILAUDID) 2 MG tablet Take 1-2 tablets (2-4 mg) by mouth every 6 hours as needed for moderate to severe pain 8/10/18  Yes Marielle Garibay APRN CNP   Insulin Aspart (NOVOLOG SC) Inject 28 Units Subcutaneous daily (with breakfast)   Yes Unknown, Entered By History   Insulin Aspart (NOVOLOG SC) Inject 30 Units Subcutaneous daily (with lunch)   Yes Unknown, Entered By History   Insulin Aspart (NOVOLOG SC) Inject 28 Units Subcutaneous daily (with dinner)    Yes Unknown, Entered By History   insulin detemir (LEVEMIR FLEXPEN/FLEXTOUCH) 100 UNIT/ML injection Inject 42 Units Subcutaneous 2 times daily   Yes Unknown, Entered By History   Lactobacillus (ACIDOPHILUS PO) Take 1 capsule by mouth daily   Yes Unknown, Entered By History   LEVOTHYROXINE SODIUM PO Take 50 mcg by mouth daily    Yes Reported, Patient   METFORMIN HCL PO Take 1,000 mg by mouth 2 times daily (with meals)   Yes Unknown, Entered By History   nitroFURantoin, macrocrystal-monohydrate, (MACROBID) 100 MG capsule Take 1 capsule (100 mg) by mouth 2 times daily 8/11/18  Yes Lewis Segal MD   order for DME Equipment being ordered: Ostomy supplies,[facility]  [place] ostomy drainable pouches. #[id number] 9/4/18  Yes Sakina Berger MD   phenazopyridine (AZO) 97.5 MG tablet Take 2 tablets (195 mg) by mouth 3 times daily 8/11/18  Yes Lewis Segal MD   polyethylene glycol (MIRALAX) powder Take one to two capfuls a day by mouth. Adjust dose so having 2-3 bowel movements a day. 9/24/18  Yes Moon Carpenter PA-C   potassium chloride (KLOR-CON) 8 MEQ CR tablet Take 16 mEq by mouth 2 times daily AM and PM in addition to afternoon dose   Yes Unknown, Entered By History   senna-docusate (SENOKOT-S;PERICOLACE) 8.6-50 MG per tablet Take 2 tablets by mouth 2 times daily as needed for constipation 8/10/18  Yes Marielle Garibay APRN CNP   Simethicone 125 MG CAPS Take 125 mg by mouth 2 times daily as needed 8/11/18  Yes Lewis Segal MD   spironolactone (ALDACTONE) 100 MG tablet Take 2 tablets (200 mg) by mouth daily 10/4/18  Yes Chapo Vargas MD   sulfamethoxazole-trimethoprim (BACTRIM DS/SEPTRA DS) 800-160 MG per tablet Take 1 tablet by mouth 2 times daily   Yes Unknown, Entered By History       Allergies  Allergies   Allergen Reactions     Adhesive Tape      Cephalosporins Anaphylaxis     Naproxen Anaphylaxis     Penicillins      Augmentin Rash     Benadryl [Anti-Itch]  "Rash     Morphine Hcl Itching and Rash       Review of systems  A 10-point review of systems was negative    Examination  BP 97/63   Pulse 98   Temp 97  F (36.1  C) (Oral)   Ht 1.753 m (5' 9\")   SpO2 97%   BMI 38.69 kg/m     Body mass index is 38.69 kg/m .    Gen- well, NAD, A+Ox3, normal color  CVS- RRR  RS- CTA  Abd- obese, healed laparoscopy wounds, SNT, no obvious ascites or organomegaly on palpation or percussion, BS+  Extr- R BKA, hands normal, no ANNY  Skin- no rash or jaundice  Neuro- no asterixis  Psych- normal mood    Laboratory  Lab Results   Component Value Date     11/01/2018    POTASSIUM 5.2 11/01/2018    CHLORIDE 96 11/01/2018    CO2 23 11/01/2018    BUN 24 11/01/2018    CR 0.97 11/01/2018       Lab Results   Component Value Date    BILITOTAL 0.7 09/24/2018    ALT 32 09/24/2018    AST 40 09/24/2018    ALKPHOS 93 09/24/2018       Lab Results   Component Value Date    ALBUMIN 3.6 09/24/2018    PROTTOTAL 8.0 09/24/2018        Lab Results   Component Value Date    WBC 4.3 09/24/2018    HGB 12.7 09/24/2018    MCV 94 09/24/2018    PLT 62 09/24/2018       Lab Results   Component Value Date    INR 1.09 09/24/2018       Radiology  EGD Nov 2018 personally reviewed    Assessment  63 year old female who presents for routine follow-up of decompensated SIN cirrhosis complicated with ascites.  MELD-Na= 7 (stable).  Ascites resolved on current diuretics- will taper down if no evidence of ascites on ultrasound today.  No evidence of hepatic encephalopathy.  Due for EGD later this month for surveillance of esophageal varices.  Due for HCC screening.    Plan  1.  Decrease diuretics if no ascites on U/S  2.  Low Na diet  3.  EGD as scheduled  4.  Abd U/S as scheduled  5.  Smoking cessation  6.  Follow-up with PA in 6 months    Chapo Stinson MD  Hepatology  [facility]    "

## 2018-12-13 ENCOUNTER — TELEPHONE (OUTPATIENT)
Dept: GASTROENTEROLOGY | Facility: CLINIC | Age: 63
End: 2018-12-13

## 2018-12-13 ENCOUNTER — CARE COORDINATION (OUTPATIENT)
Dept: GASTROENTEROLOGY | Facility: CLINIC | Age: 63
End: 2018-12-13

## 2018-12-13 NOTE — PROGRESS NOTES
Per Dr. Stinson:  LFTs, BMP normal.   CBC stable.   No evidence of tumor or HCC on U/S.   She can reduce furosemide to 40mg PO Q24 and spironolactone to 100mg Q24.   There was no answer, message left asking patient to return call to discuss the above.

## 2018-12-13 NOTE — TELEPHONE ENCOUNTER
VM with request pt contact Endoscopy Pre-assessment RN to review upcoming procedure information.      Telephone call-back number provided.  Caprice Salguero, RN  [facility] Endoscopy    Additional Information regarding appointment:   Patient scheduled for:  EGD  Indication for procedure: Hepatic cirrhosis, unspecified hepatic cirrhosis type   Date/Arrival time: Thurs; 12/20/18; 1330 (MAC)  Procedure Provider:  Milad  Referring Provider. Pauline  Prep Type: NPO /p MN, No solid food /p 2200 the night before  Facility location:  [address], [address], [id number]  Anticoagulants or blood thinners: No  FeSO4    **PLT 50 - Stinson reviewed CBC 13-Dec-18 per Chart Review Note

## 2018-12-17 NOTE — PROGRESS NOTES
Reached patient by phone. Reviewed lab results, ultrasound results and comments per Dr. Stinson. Patient asked appropriate questions. She states that she is concerned about decreasing her diuretics at this time due to the fact that her lower extremities swell up any time she does not have them wrapped. She is concerned that at the lower dose this will be more of a problem for her, especially [place]. She plans to continue at her current dosing for now, and wanted Dr. Stinson to be aware of her reasons. Will route to Dr. Stinson for review.

## 2018-12-20 ENCOUNTER — HOSPITAL ENCOUNTER (OUTPATIENT)
Facility: CLINIC | Age: 63
Discharge: HOME | End: 2018-12-20
Attending: INTERNAL MEDICINE | Admitting: INTERNAL MEDICINE
Payer: COMMERCIAL

## 2018-12-20 ENCOUNTER — ANESTHESIA EVENT (OUTPATIENT)
Dept: GASTROENTEROLOGY | Facility: CLINIC | Age: 63
End: 2018-12-20
Payer: COMMERCIAL

## 2018-12-20 ENCOUNTER — ANESTHESIA (OUTPATIENT)
Dept: GASTROENTEROLOGY | Facility: CLINIC | Age: 63
End: 2018-12-20
Payer: COMMERCIAL

## 2018-12-20 VITALS
DIASTOLIC BLOOD PRESSURE: 56 MMHG | OXYGEN SATURATION: 96 % | RESPIRATION RATE: 9 BRPM | HEART RATE: 85 BPM | SYSTOLIC BLOOD PRESSURE: 113 MMHG

## 2018-12-20 LAB
GLUCOSE BLDC GLUCOMTR-MCNC: 321 MG/DL (ref 70–99)
UPPER GI ENDOSCOPY: NORMAL

## 2018-12-20 PROCEDURE — 43235 EGD DIAGNOSTIC BRUSH WASH: CPT | Performed by: INTERNAL MEDICINE

## 2018-12-20 PROCEDURE — 25000125 ZZHC RX 250: Performed by: NURSE ANESTHETIST, CERTIFIED REGISTERED

## 2018-12-20 PROCEDURE — 37000008 ZZH ANESTHESIA TECHNICAL FEE, 1ST 30 MIN: Performed by: INTERNAL MEDICINE

## 2018-12-20 PROCEDURE — 82962 GLUCOSE BLOOD TEST: CPT

## 2018-12-20 PROCEDURE — 25000128 H RX IP 250 OP 636: Performed by: NURSE ANESTHETIST, CERTIFIED REGISTERED

## 2018-12-20 RX ORDER — LIDOCAINE 40 MG/G
CREAM TOPICAL
Status: DISCONTINUED | OUTPATIENT
Start: 2018-12-20 | End: 2018-12-20 | Stop reason: HOSPADM

## 2018-12-20 RX ORDER — SODIUM CHLORIDE, SODIUM LACTATE, POTASSIUM CHLORIDE, CALCIUM CHLORIDE 600; 310; 30; 20 MG/100ML; MG/100ML; MG/100ML; MG/100ML
INJECTION, SOLUTION INTRAVENOUS CONTINUOUS PRN
Status: DISCONTINUED | OUTPATIENT
Start: 2018-12-20 | End: 2018-12-20

## 2018-12-20 RX ORDER — FLUMAZENIL 0.1 MG/ML
0.2 INJECTION, SOLUTION INTRAVENOUS
Status: DISCONTINUED | OUTPATIENT
Start: 2018-12-20 | End: 2018-12-20 | Stop reason: HOSPADM

## 2018-12-20 RX ORDER — ONDANSETRON 2 MG/ML
4 INJECTION INTRAMUSCULAR; INTRAVENOUS EVERY 6 HOURS PRN
Status: DISCONTINUED | OUTPATIENT
Start: 2018-12-20 | End: 2018-12-20 | Stop reason: HOSPADM

## 2018-12-20 RX ORDER — PROPOFOL 10 MG/ML
INJECTION, EMULSION INTRAVENOUS CONTINUOUS PRN
Status: DISCONTINUED | OUTPATIENT
Start: 2018-12-20 | End: 2018-12-20

## 2018-12-20 RX ORDER — NALOXONE HYDROCHLORIDE 0.4 MG/ML
.1-.4 INJECTION, SOLUTION INTRAMUSCULAR; INTRAVENOUS; SUBCUTANEOUS
Status: DISCONTINUED | OUTPATIENT
Start: 2018-12-20 | End: 2018-12-20 | Stop reason: HOSPADM

## 2018-12-20 RX ORDER — ONDANSETRON 2 MG/ML
4 INJECTION INTRAMUSCULAR; INTRAVENOUS
Status: DISCONTINUED | OUTPATIENT
Start: 2018-12-20 | End: 2018-12-20 | Stop reason: HOSPADM

## 2018-12-20 RX ORDER — FENTANYL CITRATE 50 UG/ML
INJECTION, SOLUTION INTRAMUSCULAR; INTRAVENOUS PRN
Status: DISCONTINUED | OUTPATIENT
Start: 2018-12-20 | End: 2018-12-20

## 2018-12-20 RX ORDER — ONDANSETRON 4 MG/1
4 TABLET, ORALLY DISINTEGRATING ORAL EVERY 6 HOURS PRN
Status: DISCONTINUED | OUTPATIENT
Start: 2018-12-20 | End: 2018-12-20 | Stop reason: HOSPADM

## 2018-12-20 RX ADMIN — FENTANYL CITRATE 25 MCG: 50 INJECTION, SOLUTION INTRAMUSCULAR; INTRAVENOUS at 14:41

## 2018-12-20 RX ADMIN — SODIUM CHLORIDE, POTASSIUM CHLORIDE, SODIUM LACTATE AND CALCIUM CHLORIDE: 600; 310; 30; 20 INJECTION, SOLUTION INTRAVENOUS at 14:38

## 2018-12-20 RX ADMIN — MIDAZOLAM 2 MG: 1 INJECTION INTRAMUSCULAR; INTRAVENOUS at 14:41

## 2018-12-20 RX ADMIN — TOPICAL ANESTHETIC 1 EACH: 200 SPRAY DENTAL; PERIODONTAL at 14:41

## 2018-12-20 RX ADMIN — PROPOFOL 100 MCG/KG/MIN: 10 INJECTION, EMULSION INTRAVENOUS at 14:41

## 2018-12-20 NOTE — ANESTHESIA PREPROCEDURE EVALUATION
Anesthesia Pre-Procedure Evaluation    Patient: Fani Escobedo   MRN:     [id number] Gender:   female   Age:    63 year old :      1955        Preoperative Diagnosis: Hepatic cirrhosis, unspecified hepatic cirrhosis type - Prep mailed   Procedure(s):  COMBINED ESOPHAGOSCOPY, GASTROSCOPY, DUODENOSCOPY (EGD)     Past Medical History:   Diagnosis Date     Charcot foot due to diabetes mellitus (H)     (R) foot -> BKA     COPD (chronic obstructive pulmonary disease) (H)      Depression      Diabetes mellitus (H)     type II     Diabetic neuropathy (H)      Endometrial cancer (H)      Hypothyroid      Iron deficiency anemia      Obesity     bmi 46     USHA (obstructive sleep apnea)     does not use CPAP     Osteomyelitis (H)      Tobacco abuse       Past Surgical History:   Procedure Laterality Date     AMPUTATE LEG BELOW KNEE  2012    Procedure:AMPUTATE LEG BELOW KNEE; right leg below knee amputation; Surgeon:WILMAR LUI; Location: OR     AMPUTATE TOE(S)  2013    Procedure: AMPUTATE TOE(S);  PARTIAL LEFT 3RD TOE AMPUTATION;  Surgeon: Juancarlos Gamez DPM;  Location:  OR     AMPUTATION      (L) 2nd toe     ANESTHESIA OUT OF OR MRI  2013    Procedure: ANESTHESIA OUT OF OR MRI;  MRI UNDER GENERAL ANESTHESIA;  Surgeon: Provider, Generic Anesthesia;  Location:  OR     ANESTHESIA OUT OF OR MRI  2013    Procedure: ANESTHESIA OUT OF OR MRI;  ANESTHESIA OUT OF OR MRI;  Surgeon: Provider, Generic Anesthesia;  Location:  OR     BONE MARROW BIOPSY, BONE SPECIMEN, NEEDLE/TROCAR N/A 2018    Procedure: BIOPSY BONE MARROW;  bone marrow biopsy;  Surgeon: Farzana Teixeira MD;  Location:  GI     CYSTOSCOPY N/A 2018    Procedure: CYSTOSCOPY;;  Surgeon: Trudy Cortez MD;  [facility]: [facility] OR     DAVINCI HYSTERECTOMY TOTAL, BILATERAL SALPINGO-OOPHORECTOMY, COMBINED N/A 2018    Procedure: COMBINED DAVINCI HYSTERECTOMY TOTAL, SALPINGO-OOPHORECTOMY;  DaVinci Assisted Total Laparoscopic  Hysterectomy, Bilateral Salpingo-Oophorectomy, Cystoscopy, Bilateral Pelvic Lymph Node Dissection;  Surgeon: Trudy Cortez MD;  Location: [facility] OR     DILATION AND CURETTAGE N/A 6/6/2018    Procedure: DILATION AND CURETTAGE;  DILATION AND CURETTAGE ;  Surgeon: Tip Ortega MD;  Location:  OR     EXPLORE SPINE, REMOVE HARDWARE, COMBINED  10/11/2013    Procedure: COMBINED EXPLORE SPINE, REMOVE HARDWARE;  EXPLORATION AND REVISION POSTERIOR THORACIC WOUND WITH WOUND VAC PLACEMENT.;  Surgeon: Thomas Ellis MD;  Location: [facility] OR     FUSION SPINE ANTERIOR THORACIC ONE LEVEL  9/5/2013    Procedure: FUSION SPINE ANTERIOR THORACIC ONE LEVEL;  Right Thoracotomy For  T7-T8 Thorasic Vertebral Body Resection with Strut Graft, Manti Instrumentation T6-T9, BMP, Pyramesh and Intra-operative Neuro Monitoring ;  Surgeon: Thomas Ellis MD;  Location:  OR     HYSTERECTOMY RADICAL, SALPINGO-OOPHORECTOMY, NODE DISSECTION  08/08/2018    DaVinci assisted TLH, BSO, BPLND; Surgeon: Trudy Cortez MD     INCISION AND DRAINAGE RECTUM, COMBINED  6/11/2012    Procedure: COMBINED INCISION AND DRAINAGE RECTUM;  I&D ABSCESS RIGHT BUTTOCK (MRSA );  Surgeon: Christos Linton MD;  Location:  OR     INCISION AND DRAINAGE SPINE, CLOSE WOUND, COMBINED  10/14/2013    Procedure: COMBINED INCISION AND DRAINAGE SPINE, CLOSE WOUND;  EXPLORATION/REVISION POSTERIOR THORACIC WOUND COMPLEX 15 CM. ;  Surgeon: Thomas Ellis MD;  Location:  OR     IRRIGATION AND DEBRIDEMENT TRUNK, COMBINED  6/10/2012    Procedure: COMBINED IRRIGATION AND DEBRIDEMENT TRUNK;  Incision and drainage abscess right buttock;  Surgeon: Christos Linton MD;  Location:  OR     OPTICAL TRACKING SYSTEM FUSION POSTERIOR SPINE THORACIC THREE+ LEVELS  9/6/2013    Procedure: OPTICAL TRACKING SYSTEM FUSION POSTERIOR SPINE THORACIC THREE+ LEVELS;  T4-T11 POSTERIOR LATERAL PEDICLE SCREW INSTRUMENTATION WITH IMAGE GUIDANCE AND  "NEURO-MONITORING;  Surgeon: Thomas Ellis MD;  Location:  OR               [facility] AN PHYSICAL EXAM    Lab Results   Component Value Date    WBC 5.1 12/10/2018    HGB 13.0 12/10/2018    HCT 38.0 12/10/2018    PLT 50 (L) 12/10/2018    CRP 27.5 (H) 10/12/2013    SED 27 10/12/2013     (L) 11/01/2018    POTASSIUM 5.2 11/01/2018    CHLORIDE 96 11/01/2018    CO2 23 11/01/2018    BUN 24 11/01/2018    CR 0.97 11/01/2018     (H) 11/01/2018    DESIREE 8.7 11/01/2018    PHOS 3.1 09/06/2013    MAG 1.8 09/16/2013    ALBUMIN 3.5 12/10/2018    PROTTOTAL 7.7 12/10/2018    ALT 39 12/10/2018    AST 43 12/10/2018    ALKPHOS 87 12/10/2018    BILITOTAL 0.7 12/10/2018    LIPASE 38 10/14/2013    AMYLASE <30 (L) 10/14/2013    PTT 30 08/08/2018    INR 1.10 12/10/2018    FIBR 321 08/08/2018    TSH 1.24 06/05/2018       Preop Vitals  BP Readings from Last 3 Encounters:   12/10/18 97/63   11/01/18 96/49   09/24/18 112/61    Pulse Readings from Last 3 Encounters:   12/10/18 98   09/24/18 88   08/27/18 85      Resp Readings from Last 3 Encounters:   11/01/18 20   08/27/18 16   08/15/18 20    SpO2 Readings from Last 3 Encounters:   12/10/18 97%   11/01/18 95%   09/24/18 95%      Temp Readings from Last 1 Encounters:   12/10/18 36.1  C (97  F) (Oral)    Ht Readings from Last 1 Encounters:   12/10/18 1.753 m (5' 9\")      Wt Readings from Last 1 Encounters:   08/15/18 118.8 kg (262 lb)    Estimated body mass index is 38.69 kg/m  as calculated from the following:    Height as of 12/10/18: 1.753 m (5' 9\").    Weight as of 8/15/18: 118.8 kg (262 lb).     LDA:            Assessment:   ASA SCORE: 3    NPO Status: > 6 hours since completed Solid Foods   Documentation: H&P complete; Preop Testing complete; Consents complete   Proceeding: Proceed without further delay  Tobacco Use:  NO Active use of Tobacco/UNKNOWN Tobacco use status     Plan:   Anes. Type:  MAC   Pre-Induction: Midazolam IV   Induction:  IV (Standard)   Airway: Native " Airway   Access/Monitoring: PIV   Maintenance: Propofol; IV   Emergence: Procedure Site   Logistics: Same Day Surgery     Postop Pain/Sedation Strategy:  Standard-Options: Opioids PRN     PONV Management:  Adult Risk Factors: Female, Non-Smoker, Postop Opioids  Prevention: Propofol Infusion                     Patient seen by me, agree with the above.  Sam Hill MD  December 20, 2018    Sam Hill MD

## 2018-12-20 NOTE — ANESTHESIA POSTPROCEDURE EVALUATION
Anesthesia POST Procedure Evaluation    Patient: Fani Escobedo   MRN:     [id number] Gender:   female   Age:    63 year old :      1955        Preoperative Diagnosis: Hepatic cirrhosis, unspecified hepatic cirrhosis type - Prep mailed   Procedure(s):  COMBINED ESOPHAGOSCOPY, GASTROSCOPY, DUODENOSCOPY (EGD)   Postop Comments: No value filed.       Anesthesia Type:  MAC    Reportable Event: NO     PAIN: Uncomplicated   Sign Out status: Comfortable, Well controlled pain     PONV: No PONV   Sign Out status:  No Nausea or Vomiting     Neuro/Psych: Uneventful perioperative course   Sign Out Status: Preoperative baseline; Age appropriate mentation     Airway/Resp.: Uneventful perioperative course   Sign Out Status: Non labored breathing, age appropriate RR; Resp. Status within EXPECTED Parameters     CV: Uneventful perioperative course   Sign Out status: Appropriate BP and perfusion indices; Appropriate HR/Rhythm     Disposition:   Sign Out in:  PACU  Disposition:  Phase II; Home  Recovery Course: Uneventful  Follow-Up: Not required           Last Anesthesia Record Vitals:  CRNA VITALS  2018 1434 - 2018 1512      2018             NIBP:  88/77  (Abnormal)     Pulse:  79    NIBP Mean:  86    Ht Rate:  79    SpO2:  98 %    Resp Rate (observed):  2  (Abnormal)           Last PACU/Preop Vitals:  Vitals:    18 1431   BP: 117/66   Resp: 18   SpO2: 96%         Electronically Signed By: Sam Hill MD, 2018, 3:12 PM

## 2018-12-20 NOTE — ANESTHESIA CARE TRANSFER NOTE
Patient: Fani Escobedo    Procedure(s):  COMBINED ESOPHAGOSCOPY, GASTROSCOPY, DUODENOSCOPY (EGD)    Diagnosis: Hepatic cirrhosis, unspecified hepatic cirrhosis type - Prep mailed  Diagnosis Additional Information: No value filed.    Anesthesia Type:   No value filed.     Note:  Airway :Nasal Cannula  Destination: endoscopy recovery.        Vitals: (Last set prior to Anesthesia Care Transfer)    CRNA VITALS  12/20/2018 1434 - 12/20/2018 1504      12/20/2018             NIBP:  88/77  (Abnormal)     Pulse:  79    NIBP Mean:  86    Ht Rate:  79    SpO2:  98 %    Resp Rate (observed):  2  (Abnormal)                 Electronically Signed By: SHAUNA Ochoa CRNA  December 20, 2018  3:04 PM

## 2019-06-17 DIAGNOSIS — K75.81 LIVER CIRRHOSIS SECONDARY TO NASH (H): Primary | ICD-10-CM

## 2019-06-17 DIAGNOSIS — K74.60 LIVER CIRRHOSIS SECONDARY TO NASH (H): Primary | ICD-10-CM

## 2019-06-24 ENCOUNTER — ANCILLARY PROCEDURE (OUTPATIENT)
Dept: ULTRASOUND IMAGING | Facility: CLINIC | Age: 64
End: 2019-06-24
Attending: INTERNAL MEDICINE
Payer: COMMERCIAL

## 2019-06-24 ENCOUNTER — HOSPITAL ENCOUNTER (OUTPATIENT)
Facility: CLINIC | Age: 64
End: 2019-06-24
Attending: INTERNAL MEDICINE | Admitting: INTERNAL MEDICINE
Payer: COMMERCIAL

## 2019-06-24 ENCOUNTER — OFFICE VISIT (OUTPATIENT)
Dept: GASTROENTEROLOGY | Facility: CLINIC | Age: 64
End: 2019-06-24
Attending: PHYSICIAN ASSISTANT
Payer: COMMERCIAL

## 2019-06-24 ENCOUNTER — PATIENT OUTREACH (OUTPATIENT)
Dept: GASTROENTEROLOGY | Facility: CLINIC | Age: 64
End: 2019-06-24

## 2019-06-24 VITALS
WEIGHT: 243.6 LBS | DIASTOLIC BLOOD PRESSURE: 62 MMHG | BODY MASS INDEX: 35.97 KG/M2 | TEMPERATURE: 97.7 F | HEART RATE: 73 BPM | OXYGEN SATURATION: 98 % | SYSTOLIC BLOOD PRESSURE: 106 MMHG

## 2019-06-24 DIAGNOSIS — K74.60 LIVER CIRRHOSIS SECONDARY TO NASH (H): ICD-10-CM

## 2019-06-24 DIAGNOSIS — K75.81 LIVER CIRRHOSIS SECONDARY TO NASH (H): Primary | ICD-10-CM

## 2019-06-24 DIAGNOSIS — K74.60 CIRRHOSIS OF LIVER WITHOUT ASCITES, UNSPECIFIED HEPATIC CIRRHOSIS TYPE (H): ICD-10-CM

## 2019-06-24 DIAGNOSIS — K75.81 LIVER CIRRHOSIS SECONDARY TO NASH (H): ICD-10-CM

## 2019-06-24 DIAGNOSIS — K74.60 LIVER CIRRHOSIS SECONDARY TO NASH (H): Primary | ICD-10-CM

## 2019-06-24 LAB
ALBUMIN SERPL-MCNC: 3.3 G/DL (ref 3.4–5)
ALP SERPL-CCNC: 94 U/L (ref 40–150)
ALT SERPL W P-5'-P-CCNC: 27 U/L (ref 0–50)
ANION GAP SERPL CALCULATED.3IONS-SCNC: 9 MMOL/L (ref 3–14)
AST SERPL W P-5'-P-CCNC: 38 U/L (ref 0–45)
BILIRUB DIRECT SERPL-MCNC: 0.4 MG/DL (ref 0–0.2)
BILIRUB SERPL-MCNC: 0.8 MG/DL (ref 0.2–1.3)
BUN SERPL-MCNC: 14 MG/DL (ref 7–30)
CALCIUM SERPL-MCNC: 8.9 MG/DL (ref 8.5–10.1)
CHLORIDE SERPL-SCNC: 104 MMOL/L (ref 94–109)
CO2 SERPL-SCNC: 23 MMOL/L (ref 20–32)
CREAT SERPL-MCNC: 0.77 MG/DL (ref 0.52–1.04)
ERYTHROCYTE [DISTWIDTH] IN BLOOD BY AUTOMATED COUNT: 13.8 % (ref 10–15)
GFR SERPL CREATININE-BSD FRML MDRD: 81 ML/MIN/{1.73_M2}
GLUCOSE SERPL-MCNC: 184 MG/DL (ref 70–99)
HCT VFR BLD AUTO: 33.9 % (ref 35–47)
HGB BLD-MCNC: 11.4 G/DL (ref 11.7–15.7)
INR PPP: 1.16 (ref 0.86–1.14)
MCH RBC QN AUTO: 35.2 PG (ref 26.5–33)
MCHC RBC AUTO-ENTMCNC: 33.6 G/DL (ref 31.5–36.5)
MCV RBC AUTO: 105 FL (ref 78–100)
PLATELET # BLD AUTO: 39 10E9/L (ref 150–450)
POTASSIUM SERPL-SCNC: 4 MMOL/L (ref 3.4–5.3)
PROT SERPL-MCNC: 7.2 G/DL (ref 6.8–8.8)
RBC # BLD AUTO: 3.24 10E12/L (ref 3.8–5.2)
SODIUM SERPL-SCNC: 135 MMOL/L (ref 133–144)
WBC # BLD AUTO: 3.8 10E9/L (ref 4–11)

## 2019-06-24 PROCEDURE — G0463 HOSPITAL OUTPT CLINIC VISIT: HCPCS | Mod: ZF

## 2019-06-24 PROCEDURE — 80048 BASIC METABOLIC PNL TOTAL CA: CPT | Performed by: PHYSICIAN ASSISTANT

## 2019-06-24 PROCEDURE — 85610 PROTHROMBIN TIME: CPT | Performed by: PHYSICIAN ASSISTANT

## 2019-06-24 PROCEDURE — 80076 HEPATIC FUNCTION PANEL: CPT | Performed by: PHYSICIAN ASSISTANT

## 2019-06-24 PROCEDURE — 85027 COMPLETE CBC AUTOMATED: CPT | Performed by: PHYSICIAN ASSISTANT

## 2019-06-24 PROCEDURE — 36415 COLL VENOUS BLD VENIPUNCTURE: CPT | Performed by: PHYSICIAN ASSISTANT

## 2019-06-24 ASSESSMENT — PAIN SCALES - GENERAL: PAINLEVEL: EXTREME PAIN (8)

## 2019-06-24 NOTE — PROGRESS NOTES
Called and discussed need for additional labs to check iron panel. Patient is seeing her PCP tomorrow at [facility] in [place], and will have it drawn at that time. Orders faxed to [phone] and [phone].

## 2019-06-24 NOTE — LETTER
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 She has no radiologic evidence of ascites. She does not have overt HE or asterixis on exam. Her transaminases are normal. She has low platelets and mildly elevated INR, overall her liver function is stable. MELD is 8. Her hemoglobin has trended down in the last six months (13.0 to 11.4), overall pancytopenic. She is up to date with HCC screening. She is due for variceal screening in December, but will be completing this early as she will be going to [place] in September.     # Anemia: iron studies ordered. If iron deficient, would recommend a colonoscopy at the time of EGD  # Improve nutrition - likely not getting well balanced meals  # Variceal surveillance: EGD will be scheduled for September   # HCC screening: Recommend US abdomen in 6 months in [place]  # Recommend establishing care with PCP in [place]  # Follow-up in clinic with Dr. Stinson April 2020    Moon Carpenter PA-C   [facility]    -----------------------------------------------------       HPI:   Fani Escobedo is a 64 year old female presenting for follow-up.     Cirrhosis  - dx Aug 2018  - ?SIN  - hx ascites  - no hx HE or variceal bleed  - last EGD  12/20/2018:-Non-bleeding grade I esophageal varices, mild portal hypertensive gastropathy  - HCC screening- US abdomen 6/24/2019    Patient last saw Dr. Stinson on 12/10/2018. No recent hospitalizations or ER visits. No new medications. She states her insulin regimen was adjusted.     She states she has regular bowel movements. She denies any worsening of swelling in her lower extremities. Her appetite is fair. She states  she only eats one to two meals a day. She denies any consistent confusion. She mentioned one occasion when she did not recognize where she was when driving.  She gets chilled easily. Per chart review, her weight is down 20 lbs.     Patient denies jaundice, abdominal distension..  Patient also denies melena, hematochezia or hematemesis. Patient denies fevers, sweats or chills. She is not sure where or when her last colonoscopy was.     Her  was diagnosed with multiple myeloma this winter. He finished chemotherapy and is getting stronger. She will be going down to [place] from September to April. She states they will be selling their home in [place].     Medical hx Surgical hx   Past Medical History:   Diagnosis Date     Charcot foot due to diabetes mellitus (H)      COPD (chronic obstructive pulmonary disease) (H)      Depression      Diabetes mellitus (H)      Diabetic neuropathy (H)      Endometrial cancer (H)      Hypothyroid      Iron deficiency anemia      Obesity      USHA (obstructive sleep apnea)      Osteomyelitis (H)      Tobacco abuse     Past Surgical History:   Procedure Laterality Date     AMPUTATE LEG BELOW KNEE  4/14/2012    Procedure:AMPUTATE LEG BELOW KNEE; right leg below knee amputation; Surgeon:WILMAR LUI; Location: OR     AMPUTATE TOE(S)  5/1/2013    Procedure: AMPUTATE TOE(S);  PARTIAL LEFT 3RD TOE AMPUTATION;  Surgeon: Juancarlos Gamez DPM;  Location:  OR     AMPUTATION      (L) 2nd toe     ANESTHESIA OUT OF OR MRI  6/11/2013    Procedure: ANESTHESIA OUT OF OR MRI;  MRI UNDER GENERAL ANESTHESIA;  Surgeon: Provider, Generic Anesthesia;  Location:  OR     ANESTHESIA OUT OF OR MRI  9/4/2013    Procedure: ANESTHESIA OUT OF OR MRI;  ANESTHESIA OUT OF OR MRI;  Surgeon: Provider, Generic Anesthesia;  Location:  OR     BONE MARROW BIOPSY, BONE SPECIMEN, NEEDLE/TROCAR N/A 6/7/2018    Procedure: BIOPSY BONE MARROW;  bone marrow biopsy;  Surgeon: Farzana Teixeira MD;  Location:  GI      CYSTOSCOPY N/A 8/8/2018    Procedure: CYSTOSCOPY;;  Surgeon: Trudy Cortez MD;  [facility]     DAVINCI HYSTERECTOMY TOTAL, BILATERAL SALPINGO-OOPHORECTOMY, COMBINED N/A 8/8/2018    Procedure: COMBINED DAVINCI HYSTERECTOMY TOTAL, SALPINGO-OOPHORECTOMY;  DaVinci Assisted Total Laparoscopic Hysterectomy, Bilateral Salpingo-Oophorectomy, Cystoscopy, Bilateral Pelvic Lymph Node Dissection;  Surgeon: Trudy Cortez MD;  [facility]     DILATION AND CURETTAGE N/A 6/6/2018    Procedure: DILATION AND CURETTAGE;  DILATION AND CURETTAGE ;  Surgeon: Tip Ortega MD;  Location:  OR     ESOPHAGOSCOPY, GASTROSCOPY, DUODENOSCOPY (EGD), COMBINED N/A 12/20/2018    Procedure: COMBINED ESOPHAGOSCOPY, GASTROSCOPY, DUODENOSCOPY (EGD);  Surgeon: Chapo Vargas MD;  [facility]: [facility]     EXPLORE SPINE, REMOVE HARDWARE, COMBINED  10/11/2013    Procedure: COMBINED EXPLORE SPINE, REMOVE HARDWARE;  EXPLORATION AND REVISION POSTERIOR THORACIC WOUND WITH WOUND VAC PLACEMENT.;  Surgeon: Thomas Ellis MD;  Location:  OR     FUSION SPINE ANTERIOR THORACIC ONE LEVEL  9/5/2013    Procedure: FUSION SPINE ANTERIOR THORACIC ONE LEVEL;  Right Thoracotomy For  T7-T8 Thorasic Vertebral Body Resection with Strut Graft, [place] Instrumentation T6-T9, BMP, Pyramesh and Intra-operative Neuro Monitoring ;  Surgeon: Thomas Ellis MD;  Location:  OR     HYSTERECTOMY RADICAL, SALPINGO-OOPHORECTOMY, NODE DISSECTION  08/08/2018    DaVinci assisted TLH, BSO, BPLND; Surgeon: Trudy Cortez MD     INCISION AND DRAINAGE RECTUM, COMBINED  6/11/2012    Procedure: COMBINED INCISION AND DRAINAGE RECTUM;  I&D ABSCESS RIGHT BUTTOCK (MRSA );  Surgeon: Christos Linton MD;  Location: [facility]     INCISION AND DRAINAGE SPINE, CLOSE WOUND, COMBINED  10/14/2013    Procedure: COMBINED INCISION AND DRAINAGE SPINE, CLOSE WOUND;  EXPLORATION/REVISION POSTERIOR THORACIC WOUND COMPLEX 15 CM. ;  Surgeon: Thomas Ellis  MD Mehran;  Location: [facility]     IRRIGATION AND DEBRIDEMENT TRUNK, COMBINED  6/10/2012    Procedure: COMBINED IRRIGATION AND DEBRIDEMENT TRUNK;  Incision and drainage abscess right buttock;  Surgeon: Christos Linton MD;  Location[facility]     OPTICAL TRACKING SYSTEM FUSION POSTERIOR SPINE THORACIC THREE+ LEVELS  9/6/2013    Procedure: OPTICAL TRACKING SYSTEM FUSION POSTERIOR SPINE THORACIC THREE+ LEVELS;  T4-T11 POSTERIOR LATERAL PEDICLE SCREW INSTRUMENTATION WITH IMAGE GUIDANCE AND NEURO-MONITORING;  Surgeon: Thomas Ellis MD;  Location:  OR                 Medications:     Current Outpatient Medications   Medication     B Complex Vitamins (VITAMIN  B COMPLEX) tablet     ferrous sulfate (IRON) 325 (65 Fe) MG tablet     furosemide (LASIX) 40 MG tablet     GABAPENTIN PO     HYDROmorphone (DILAUDID) 2 MG tablet     Insulin Aspart (NOVOLOG SC)     Insulin Aspart (NOVOLOG SC)     Insulin Aspart (NOVOLOG SC)     insulin detemir (LEVEMIR FLEXPEN/FLEXTOUCH) 100 UNIT/ML injection     Lactobacillus (ACIDOPHILUS PO)     LEVOTHYROXINE SODIUM PO     METFORMIN HCL PO     nitroFURantoin, macrocrystal-monohydrate, (MACROBID) 100 MG capsule     order for DME     phenazopyridine (AZO) 97.5 MG tablet     polyethylene glycol (MIRALAX) powder     potassium chloride (KLOR-CON) 8 MEQ CR tablet     senna-docusate (SENOKOT-S;PERICOLACE) 8.6-50 MG per tablet     Simethicone 125 MG CAPS     spironolactone (ALDACTONE) 100 MG tablet     sulfamethoxazole-trimethoprim (BACTRIM DS/SEPTRA DS) 800-160 MG per tablet     No current facility-administered medications for this visit.             Allergies:     Allergies   Allergen Reactions     Adhesive Tape      Cephalosporins Anaphylaxis     Naproxen Anaphylaxis     Penicillins      Augmentin Rash     Benadryl [Anti-Itch] Rash     Morphine Hcl Itching and Rash            Review of Systems:   10 points ROS was obtained and highlighted in the HPI, otherwise negative.           Physical Exam:   VS:  /62   Pulse 73   Temp 97.7  F (36.5  C) (Oral)   Wt 110.5 kg (243 lb 9.6 oz)   SpO2 98%   BMI 35.97 kg/m         Gen: A&Ox3, NAD, morbidly obese, appears chronically ill   HEENT: non-icteric   CV: RRR, no overt murmurs  Lung: CTA Bilatererally, no wheezing or crackles.   Lym- no palpable lymphadenopathy  Abd: obese abdomen in wheelchair. Did not palpate due to habitus.   Ext: pitting edema in left lower leg with chronic venous stasis skin changes, no bright bright erythema or warm to touch. No pitting in BKA  Skin: No rash, no palmar erythema, telangiectasias or jaundice  Neuro: grossly intact, no asterixis   Psych: appropriate mood and affects           Data:   Reviewed in person and significant for:    Lab Results   Component Value Date     06/24/2019      Lab Results   Component Value Date    POTASSIUM 4.0 06/24/2019     Lab Results   Component Value Date    CHLORIDE 104 06/24/2019     Lab Results   Component Value Date    CO2 23 06/24/2019     Lab Results   Component Value Date    BUN 14 06/24/2019     Lab Results   Component Value Date    CR 0.77 06/24/2019       Lab Results   Component Value Date    WBC 3.8 06/24/2019     Lab Results   Component Value Date    HGB 11.4 06/24/2019     Lab Results   Component Value Date    HCT 33.9 06/24/2019     Lab Results   Component Value Date     06/24/2019     Lab Results   Component Value Date    PLT 39 06/24/2019       Lab Results   Component Value Date    AST 38 06/24/2019     Lab Results   Component Value Date    ALT 27 06/24/2019     Lab Results   Component Value Date    BILICONJ 0.0 10/14/2013      Lab Results   Component Value Date    BILITOTAL 0.8 06/24/2019       Lab Results   Component Value Date    ALBUMIN 3.3 06/24/2019     Lab Results   Component Value Date    PROTTOTAL 7.2 06/24/2019      Lab Results   Component Value Date    ALKPHOS 94 06/24/2019       Lab Results   Component Value Date    INR 1.16 06/24/2019      6/24/2019:   US ABDOMEN   1. Cirrhotic configuration of the liver parenchyma. No focal liver  lesions.  a. LI-RADS US Category: US-1 Negative: No US evidence of HCC  b. Recommend continued surveillance US.  2. Cholelithiasis. No associated sonographic findings of acute  cholecystitis.  3. The kidneys were partially evaluated due to overlying bowel gas. No  hydronephrosis.    HARDEEP VictoriaC

## 2019-06-24 NOTE — NURSING NOTE
Chief Complaint   Patient presents with     RECHECK     Liver cirrhosis secondary to SIN      /62   Pulse 73   Temp 97.7  F (36.5  C) (Oral)   Wt 110.5 kg (243 lb 9.6 oz)   SpO2 98%   BMI 35.97 kg/m    Nanda Naidu CMA  6/24/2019 12:48 PM

## 2019-06-24 NOTE — PROGRESS NOTES
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 She has no radiologic evidence of ascites. She does not have overt HE or asterixis on exam. Her transaminases are normal. She has low platelets and mildly elevated INR, overall her liver function is stable. MELD is 8. Her hemoglobin has trended down in the last six months (13.0 to 11.4), overall pancytopenic. She is up to date with HCC screening. She is due for variceal screening in December, but will be completing this early as she will be going to [place] in September.     # Anemia: iron studies ordered. If iron deficient, would recommend a colonoscopy at the time of EGD  # Improve nutrition - likely not getting well balanced meals  # Variceal surveillance: EGD will be scheduled for September   # HCC screening: Recommend US abdomen in 6 months in [place]  # Recommend establishing care with PCP in [place]  # Follow-up in clinic with Dr. Stinson April 2020    Moon Carpenter PA-C   [facility]    -----------------------------------------------------       HPI:   Fani Escobedo is a 64 year old female presenting for follow-up.     Cirrhosis  - dx Aug 2018  - ?SIN  - hx ascites  - no hx HE or variceal bleed  - last EGD 12/20/2018:-Non-bleeding grade I esophageal varices, mild portal hypertensive gastropathy  - HCC screening- US abdomen 6/24/2019    Patient last saw Dr. Stinson on 12/10/2018. No recent hospitalizations or ER visits. No new medications. She states her insulin regimen was adjusted.     She states she has regular bowel movements. She denies any worsening of swelling in her lower extremities. Her appetite is fair. She states she only eats one to two meals a day. She denies any consistent confusion. She  mentioned one occasion when she did not recognize where she was when driving.  She gets chilled easily. Per chart review, her weight is down 20 lbs.     Patient denies jaundice, abdominal distension..  Patient also denies melena, hematochezia or hematemesis. Patient denies fevers, sweats or chills. She is not sure where or when her last colonoscopy was.     Her  was diagnosed with multiple myeloma this winter. He finished chemotherapy and is getting stronger. She will be going down to [place] from September to April. She states they will be selling their home in [place].     Medical hx Surgical hx   Past Medical History:   Diagnosis Date     Charcot foot due to diabetes mellitus (H)      COPD (chronic obstructive pulmonary disease) (H)      Depression      Diabetes mellitus (H)      Diabetic neuropathy (H)      Endometrial cancer (H)      Hypothyroid      Iron deficiency anemia      Obesity      USHA (obstructive sleep apnea)      Osteomyelitis (H)      Tobacco abuse     Past Surgical History:   Procedure Laterality Date     AMPUTATE LEG BELOW KNEE  4/14/2012    Procedure:AMPUTATE LEG BELOW KNEE; right leg below knee amputation; Surgeon:WILMAR LUI; Location: OR     AMPUTATE TOE(S)  5/1/2013    Procedure: AMPUTATE TOE(S);  PARTIAL LEFT 3RD TOE AMPUTATION;  Surgeon: Juancarlos Gamez DPM;  Location:  OR     AMPUTATION      (L) 2nd toe     ANESTHESIA OUT OF OR MRI  6/11/2013    Procedure: ANESTHESIA OUT OF OR MRI;  MRI UNDER GENERAL ANESTHESIA;  Surgeon: Provider, Generic Anesthesia;  Location:  OR     ANESTHESIA OUT OF OR MRI  9/4/2013    Procedure: ANESTHESIA OUT OF OR MRI;  ANESTHESIA OUT OF OR MRI;  Surgeon: Provider, Generic Anesthesia;  Location:  OR     BONE MARROW BIOPSY, BONE SPECIMEN, NEEDLE/TROCAR N/A 6/7/2018    Procedure: BIOPSY BONE MARROW;  bone marrow biopsy;  Surgeon: Farzana Teixeira MD;  Location:  GI     CYSTOSCOPY N/A 8/8/2018    Procedure: CYSTOSCOPY;;  Surgeon: Trudy Cortez  MD Kathie;  [facility]: [facility]     DAVINCI HYSTERECTOMY TOTAL, BILATERAL SALPINGO-OOPHORECTOMY, COMBINED N/A 8/8/2018    Procedure: COMBINED DAVINCI HYSTERECTOMY TOTAL, SALPINGO-OOPHORECTOMY;  DaVinci Assisted Total Laparoscopic Hysterectomy, Bilateral Salpingo-Oophorectomy, Cystoscopy, Bilateral Pelvic Lymph Node Dissection;  Surgeon: Trudy Cortez MD;  Location: [facility] OR     DILATION AND CURETTAGE N/A 6/6/2018    Procedure: DILATION AND CURETTAGE;  DILATION AND CURETTAGE ;  Surgeon: Tip Ortega MD;  Location:  OR     ESOPHAGOSCOPY, GASTROSCOPY, DUODENOSCOPY (EGD), COMBINED N/A 12/20/2018    Procedure: COMBINED ESOPHAGOSCOPY, GASTROSCOPY, DUODENOSCOPY (EGD);  Surgeon: Chapo Vargas MD;  [facility]     EXPLORE SPINE, REMOVE HARDWARE, COMBINED  10/11/2013    Procedure: COMBINED EXPLORE SPINE, REMOVE HARDWARE;  EXPLORATION AND REVISION POSTERIOR THORACIC WOUND WITH WOUND VAC PLACEMENT.;  Surgeon: Thomas Ellis MD;  Location:  OR     FUSION SPINE ANTERIOR THORACIC ONE LEVEL  9/5/2013    Procedure: FUSION SPINE ANTERIOR THORACIC ONE LEVEL;  Right Thoracotomy For  T7-T8 Thorasic Vertebral Body Resection with Strut Graft, [place] Instrumentation T6-T9, BMP, Pyramesh and Intra-operative Neuro Monitoring ;  Surgeon: Thomas Ellis MD;  Location:  OR     HYSTERECTOMY RADICAL, SALPINGO-OOPHORECTOMY, NODE DISSECTION  08/08/2018    DaVinci assisted TLH, BSO, BPLND; Surgeon: Trudy Cortez MD     INCISION AND DRAINAGE RECTUM, COMBINED  6/11/2012    Procedure: COMBINED INCISION AND DRAINAGE RECTUM;  I&D ABSCESS RIGHT BUTTOCK (MRSA );  Surgeon: Christos Linton MD;  Location:  OR     INCISION AND DRAINAGE SPINE, CLOSE WOUND, COMBINED  10/14/2013    Procedure: COMBINED INCISION AND DRAINAGE SPINE, CLOSE WOUND;  EXPLORATION/REVISION POSTERIOR THORACIC WOUND COMPLEX 15 CM. ;  Surgeon: Thomas Ellis MD;  Location:  OR     IRRIGATION AND DEBRIDEMENT TRUNK, COMBINED   6/10/2012    Procedure: COMBINED IRRIGATION AND DEBRIDEMENT TRUNK;  Incision and drainage abscess right buttock;  Surgeon: Christos Linton MD;  [facility]     OPTICAL TRACKING SYSTEM FUSION POSTERIOR SPINE THORACIC THREE+ LEVELS  9/6/2013    Procedure: OPTICAL TRACKING SYSTEM FUSION POSTERIOR SPINE THORACIC THREE+ LEVELS;  T4-T11 POSTERIOR LATERAL PEDICLE SCREW INSTRUMENTATION WITH IMAGE GUIDANCE AND NEURO-MONITORING;  Surgeon: Thomas Ellis MD;  Location: [facility]                 Medications:     Current Outpatient Medications   Medication     B Complex Vitamins (VITAMIN  B COMPLEX) tablet     ferrous sulfate (IRON) 325 (65 Fe) MG tablet     furosemide (LASIX) 40 MG tablet     GABAPENTIN PO     HYDROmorphone (DILAUDID) 2 MG tablet     Insulin Aspart (NOVOLOG SC)     Insulin Aspart (NOVOLOG SC)     Insulin Aspart (NOVOLOG SC)     insulin detemir (LEVEMIR FLEXPEN/FLEXTOUCH) 100 UNIT/ML injection     Lactobacillus (ACIDOPHILUS PO)     LEVOTHYROXINE SODIUM PO     METFORMIN HCL PO     nitroFURantoin, macrocrystal-monohydrate, (MACROBID) 100 MG capsule     order for DME     phenazopyridine (AZO) 97.5 MG tablet     polyethylene glycol (MIRALAX) powder     potassium chloride (KLOR-CON) 8 MEQ CR tablet     senna-docusate (SENOKOT-S;PERICOLACE) 8.6-50 MG per tablet     Simethicone 125 MG CAPS     spironolactone (ALDACTONE) 100 MG tablet     sulfamethoxazole-trimethoprim (BACTRIM DS/SEPTRA DS) 800-160 MG per tablet     No current facility-administered medications for this visit.             Allergies:     Allergies   Allergen Reactions     Adhesive Tape      Cephalosporins Anaphylaxis     Naproxen Anaphylaxis     Penicillins      Augmentin Rash     Benadryl [Anti-Itch] Rash     Morphine Hcl Itching and Rash            Review of Systems:   10 points ROS was obtained and highlighted in the HPI, otherwise negative.          Physical Exam:   VS:  /62   Pulse 73   Temp 97.7  F (36.5  C) (Oral)   Wt  110.5 kg (243 lb 9.6 oz)   SpO2 98%   BMI 35.97 kg/m        Gen: A&Ox3, NAD, morbidly obese, appears chronically ill   HEENT: non-icteric   CV: RRR, no overt murmurs  Lung: CTA Bilatererally, no wheezing or crackles.   Lym- no palpable lymphadenopathy  Abd: obese abdomen in wheelchair. Did not palpate due to habitus.   Ext: pitting edema in left lower leg with chronic venous stasis skin changes, no bright bright erythema or warm to touch. No pitting in BKA  Skin: No rash, no palmar erythema, telangiectasias or jaundice  Neuro: grossly intact, no asterixis   Psych: appropriate mood and affects           Data:   Reviewed in person and significant for:    Lab Results   Component Value Date     06/24/2019      Lab Results   Component Value Date    POTASSIUM 4.0 06/24/2019     Lab Results   Component Value Date    CHLORIDE 104 06/24/2019     Lab Results   Component Value Date    CO2 23 06/24/2019     Lab Results   Component Value Date    BUN 14 06/24/2019     Lab Results   Component Value Date    CR 0.77 06/24/2019       Lab Results   Component Value Date    WBC 3.8 06/24/2019     Lab Results   Component Value Date    HGB 11.4 06/24/2019     Lab Results   Component Value Date    HCT 33.9 06/24/2019     Lab Results   Component Value Date     06/24/2019     Lab Results   Component Value Date    PLT 39 06/24/2019       Lab Results   Component Value Date    AST 38 06/24/2019     Lab Results   Component Value Date    ALT 27 06/24/2019     Lab Results   Component Value Date    BILICONJ 0.0 10/14/2013      Lab Results   Component Value Date    BILITOTAL 0.8 06/24/2019       Lab Results   Component Value Date    ALBUMIN 3.3 06/24/2019     Lab Results   Component Value Date    PROTTOTAL 7.2 06/24/2019      Lab Results   Component Value Date    ALKPHOS 94 06/24/2019       Lab Results   Component Value Date    INR 1.16 06/24/2019 6/24/2019:   US ABDOMEN   1. Cirrhotic configuration of the liver parenchyma. No  focal liver  lesions.  a. LI-RADS US Category: US-1 Negative: No US evidence of HCC  b. Recommend continued surveillance US.  2. Cholelithiasis. No associated sonographic findings of acute  cholecystitis.  3. The kidneys were partially evaluated due to overlying bowel gas. No  hydronephrosis.

## 2019-07-09 ENCOUNTER — PATIENT OUTREACH (OUTPATIENT)
Dept: GASTROENTEROLOGY | Facility: CLINIC | Age: 64
End: 2019-07-09

## 2019-07-09 DIAGNOSIS — K74.60 CIRRHOSIS OF LIVER WITHOUT ASCITES, UNSPECIFIED HEPATIC CIRRHOSIS TYPE (H): Primary | ICD-10-CM

## 2019-07-09 NOTE — PROGRESS NOTES
Called patient and reviewed recommendation per Dr. Stinson: repeat CBC later this month or early Aug. If CBC is decreasing, will do colonoscopy same day as EGD.  Patient will go to [facility] later this month and get this drawn. Will call patient when Dr. Stinson reviews the results.

## 2019-07-15 ENCOUNTER — ONCOLOGY VISIT (OUTPATIENT)
Dept: ONCOLOGY | Facility: CLINIC | Age: 64
End: 2019-07-15
Payer: COMMERCIAL

## 2019-07-15 ENCOUNTER — TELEPHONE (OUTPATIENT)
Dept: ONCOLOGY | Facility: CLINIC | Age: 64
End: 2019-07-15

## 2019-07-15 VITALS
TEMPERATURE: 98.2 F | RESPIRATION RATE: 16 BRPM | OXYGEN SATURATION: 97 % | SYSTOLIC BLOOD PRESSURE: 107 MMHG | DIASTOLIC BLOOD PRESSURE: 68 MMHG | HEART RATE: 80 BPM

## 2019-07-15 DIAGNOSIS — B37.31 CANDIDIASIS OF VAGINA: ICD-10-CM

## 2019-07-15 DIAGNOSIS — C54.1 ENDOMETRIAL CANCER (H): Primary | ICD-10-CM

## 2019-07-15 PROCEDURE — 99213 OFFICE O/P EST LOW 20 MIN: CPT | Performed by: NURSE PRACTITIONER

## 2019-07-15 RX ORDER — FLUCONAZOLE 150 MG/1
150 TABLET ORAL ONCE
Qty: 1 TABLET | Refills: 0 | Status: SHIPPED | OUTPATIENT
Start: 2019-07-15 | End: 2019-07-15

## 2019-07-15 ASSESSMENT — PAIN SCALES - GENERAL: PAINLEVEL: SEVERE PAIN (6)

## 2019-07-15 NOTE — LETTER
7/15/2019         RE: Fani Escobedo  [address] [place] [address]        Dear Colleague,    Thank you for referring your patient, Fani Escobedo, to the [facility]. Please see a copy of my visit note below.                Follow Up Notes on Referred Patient    Date: 7/15/2019       RE: Fani Escobedo  : 1955  MARTHA: 7/15/2019      Fani Escobedo is a 64 year old woman with a diagnosis of stage 1A, grade 1 endometrioid adenocarcinoma.  she is here today for a surveillance visit. She was last seen in this clinic 2018.     History:     18 D&C                Pathology revealed endometrioid adenocarcinoma, FIGO grade1.      18:   Robotic assisted total laparoscopic hysterectomy, bilateral salpingo-oophorectomy, bilateral pelvic lymph node dissection, washings, cystoscopy                Pathology:  Stage 1A, grade 2 endometrial adenocarcinoma, tumor size 2.6 cm, 5/18 mm myometrial invasion, + LVSI, 0/23 pelvic LN, normal MMR        Today she comes to clinic and states she has not seen anyone for a pelvic exam since she was in clinic last August. She reports she did see some spotting about 3 weeks ago; she states this happened 3 times and she believes it was from the vagina. She was seen by her PCP for urinary urgency/frequncy at this time and did have a trace amount of blood in her urine. She is on Bactrim daily. She does have urinary incontinence. Her BG have been <200. She continues to see GI for her cirrhosis. She is declining further breast cancer screening. She is current with seeing her PCP for her annual exam. She states she is able to transfer to the exam table.         Review of Systems:    Systemic           no weight changes; no fever; no chills; no night sweats; no appetite changes  Skin           no rashes, or lesions  Eye           no irritation; no changes in vision  Dilma-Laryngeal           no dysphagia; no hoarseness   Pulmonary    no cough; no shortness of  breath  Cardiovascular    no chest pain; no palpitations  Gastrointestinal    no diarrhea; no constipation; no abdominal pain; no changes in bowel habits; no blood in stool  Genitourinary   no urinary frequency; no urinary urgency; no dysuria; no pain; no abnormal vaginal discharge; no abnormal vaginal bleeding  Breast    no breast discharge; no breast changes; no breast pain  Musculoskeletal    no myalgias; no arthralgias; no back pain  Psychiatric           no depressed mood; no anxiety    Hematologic              no tender lymph nodes; no noticeable swellings or lumps   Endocrine    no hot flashes; no heat/cold intolerance; + DM         Neurological   no tremor; no numbness and tingling; no headaches; no difficulty sleeping      Past Medical History:    Past Medical History:   Diagnosis Date     Charcot foot due to diabetes mellitus (H)     (R) foot -> BKA     COPD (chronic obstructive pulmonary disease) (H)      Depression      Diabetes mellitus (H)     type II     Diabetic neuropathy (H)      Endometrial cancer (H)      Hypothyroid      Iron deficiency anemia      Obesity     bmi 46     USHA (obstructive sleep apnea)     does not use CPAP     Osteomyelitis (H)      Tobacco abuse          Past Surgical History:    Past Surgical History:   Procedure Laterality Date     AMPUTATE LEG BELOW KNEE  4/14/2012    Procedure:AMPUTATE LEG BELOW KNEE; right leg below knee amputation; Surgeon:WILMAR LUI; Location: OR     AMPUTATE TOE(S)  5/1/2013    Procedure: AMPUTATE TOE(S);  PARTIAL LEFT 3RD TOE AMPUTATION;  Surgeon: Juancarlos Gamez DPM;  Location:  OR     AMPUTATION      (L) 2nd toe     ANESTHESIA OUT OF OR MRI  6/11/2013    Procedure: ANESTHESIA OUT OF OR MRI;  MRI UNDER GENERAL ANESTHESIA;  Surgeon: Provider, Generic Anesthesia;  Location:  OR     ANESTHESIA OUT OF OR MRI  9/4/2013    Procedure: ANESTHESIA OUT OF OR MRI;  ANESTHESIA OUT OF OR MRI;  Surgeon: Provider, Generic Anesthesia;  Location:   OR     BONE MARROW BIOPSY, BONE SPECIMEN, NEEDLE/TROCAR N/A 6/7/2018    Procedure: BIOPSY BONE MARROW;  bone marrow biopsy;  Surgeon: Farzana Teixeira MD;  Location:  GI     CYSTOSCOPY N/A 8/8/2018    Procedure: CYSTOSCOPY;;  Surgeon: Trudy Cortez MD;  Location:  OR     DAVINCI HYSTERECTOMY TOTAL, BILATERAL SALPINGO-OOPHORECTOMY, COMBINED N/A 8/8/2018    Procedure: COMBINED DAVINCI HYSTERECTOMY TOTAL, SALPINGO-OOPHORECTOMY;  DaVinci Assisted Total Laparoscopic Hysterectomy, Bilateral Salpingo-Oophorectomy, Cystoscopy, Bilateral Pelvic Lymph Node Dissection;  Surgeon: Trudy Cortez MD;  Location:  OR     DILATION AND CURETTAGE N/A 6/6/2018    Procedure: DILATION AND CURETTAGE;  DILATION AND CURETTAGE ;  Surgeon: Tip Ortega MD;  Location:  OR     ESOPHAGOSCOPY, GASTROSCOPY, DUODENOSCOPY (EGD), COMBINED N/A 12/20/2018    Procedure: COMBINED ESOPHAGOSCOPY, GASTROSCOPY, DUODENOSCOPY (EGD);  Surgeon: Chapo Vargas MD;  Location:  GI     EXPLORE SPINE, REMOVE HARDWARE, COMBINED  10/11/2013    Procedure: COMBINED EXPLORE SPINE, REMOVE HARDWARE;  EXPLORATION AND REVISION POSTERIOR THORACIC WOUND WITH WOUND VAC PLACEMENT.;  Surgeon: Thomas Ellis MD;  Location:  OR     FUSION SPINE ANTERIOR THORACIC ONE LEVEL  9/5/2013    Procedure: FUSION SPINE ANTERIOR THORACIC ONE LEVEL;  Right Thoracotomy For  T7-T8 Thorasic Vertebral Body Resection with Strut Graft, [place] Instrumentation T6-T9, BMP, Pyramesh and Intra-operative Neuro Monitoring ;  Surgeon: Thomas Ellis MD;  Location:  OR     HYSTERECTOMY RADICAL, SALPINGO-OOPHORECTOMY, NODE DISSECTION  08/08/2018    DaVinci assisted TLH, BSO, BPLND; Surgeon: Trudy Cortez MD     INCISION AND DRAINAGE RECTUM, COMBINED  6/11/2012    Procedure: COMBINED INCISION AND DRAINAGE RECTUM;  I&D ABSCESS RIGHT BUTTOCK (MRSA );  Surgeon: Christos Linton MD;  Location:  OR     INCISION AND DRAINAGE SPINE, CLOSE WOUND,  COMBINED  10/14/2013    Procedure: COMBINED INCISION AND DRAINAGE SPINE, CLOSE WOUND;  EXPLORATION/REVISION POSTERIOR THORACIC WOUND COMPLEX 15 CM. ;  Surgeon: Thomas Ellis MD;  Location: [facility] OR     IRRIGATION AND DEBRIDEMENT TRUNK, COMBINED  6/10/2012    Procedure: COMBINED IRRIGATION AND DEBRIDEMENT TRUNK;  Incision and drainage abscess right buttock;  Surgeon: Christos Linton MD;  Location:  OR     OPTICAL TRACKING SYSTEM FUSION POSTERIOR SPINE THORACIC THREE+ LEVELS  9/6/2013    Procedure: OPTICAL TRACKING SYSTEM FUSION POSTERIOR SPINE THORACIC THREE+ LEVELS;  T4-T11 POSTERIOR LATERAL PEDICLE SCREW INSTRUMENTATION WITH IMAGE GUIDANCE AND NEURO-MONITORING;  Surgeon: Thomas Ellis MD;  Location: [facility] OR         Health Maintenance Due   Topic Date Due     PREVENTIVE CARE VISIT  1955     MICROALBUMIN  1955     SPIROMETRY  1955     ADVANCE CARE PLANNING  1955     COPD ACTION PLAN  1955     EYE EXAM  1955     MAMMO SCREENING  1955     PAP  1955     COLONOSCOPY  04/19/1965     HIV SCREENING  04/19/1970     ZOSTER IMMUNIZATION (1 of 2) 04/19/2005     DIABETIC FOOT EXAM  12/06/2018     A1C  02/08/2019     PHQ-9  02/15/2019     LIPID  06/19/2019       Current Medications:     Current Outpatient Medications   Medication Sig Dispense Refill     fluconazole (DIFLUCAN) 150 MG tablet Take 1 tablet (150 mg) by mouth once for 1 dose 1 tablet 0     furosemide (LASIX) 40 MG tablet Take 2 tablets (80 mg) by mouth daily 180 tablet 3     GABAPENTIN PO Take 1,200 mg by mouth 3 times daily 2 x 600 mg tab       Insulin Aspart (NOVOLOG SC) Inject 28 Units Subcutaneous daily (with breakfast)       Insulin Aspart (NOVOLOG SC) Inject 30 Units Subcutaneous daily (with lunch)       Insulin Aspart (NOVOLOG SC) Inject 28 Units Subcutaneous daily (with dinner)       insulin detemir (LEVEMIR FLEXPEN/FLEXTOUCH) 100 UNIT/ML injection Inject 42 Units Subcutaneous 2 times  daily       spironolactone (ALDACTONE) 100 MG tablet Take 2 tablets (200 mg) by mouth daily 180 tablet 3     sulfamethoxazole-trimethoprim (BACTRIM DS/SEPTRA DS) 800-160 MG per tablet Take 1 tablet by mouth 2 times daily       B Complex Vitamins (VITAMIN  B COMPLEX) tablet Take 1 tablet by mouth daily.       ferrous sulfate (IRON) 325 (65 Fe) MG tablet Take 1 tablet (325 mg) by mouth daily (Patient not taking: Reported on 7/15/2019) 30 tablet 0     HYDROmorphone (DILAUDID) 2 MG tablet Take 1-2 tablets (2-4 mg) by mouth every 6 hours as needed for moderate to severe pain (Patient not taking: Reported on 7/15/2019) 10 tablet 0     Lactobacillus (ACIDOPHILUS PO) Take 1 capsule by mouth daily       LEVOTHYROXINE SODIUM PO Take 50 mcg by mouth daily        METFORMIN HCL PO Take 1,000 mg by mouth 2 times daily (with meals)       nitroFURantoin, macrocrystal-monohydrate, (MACROBID) 100 MG capsule Take 1 capsule (100 mg) by mouth 2 times daily (Patient not taking: Reported on 7/15/2019) 14 capsule 0     order for DME Equipment being ordered: Ostomy supplies,[facility]  [place] ostomy drainable pouches. #[id number] (Patient not taking: Reported on 7/15/2019) 10 each 1     phenazopyridine (AZO) 97.5 MG tablet Take 2 tablets (195 mg) by mouth 3 times daily (Patient not taking: Reported on 7/15/2019) 12 tablet 0     polyethylene glycol (MIRALAX) powder Take one to two capfuls a day by mouth. Adjust dose so having 2-3 bowel movements a day. (Patient not taking: Reported on 7/15/2019) 238 g 3     potassium chloride (KLOR-CON) 8 MEQ CR tablet Take 16 mEq by mouth 2 times daily AM and PM in addition to afternoon dose       senna-docusate (SENOKOT-S;PERICOLACE) 8.6-50 MG per tablet Take 2 tablets by mouth 2 times daily as needed for constipation (Patient not taking: Reported on 7/15/2019) 100 tablet 0     Simethicone 125 MG CAPS Take 125 mg by mouth 2 times daily as needed (Patient not taking: Reported on 7/15/2019) 28 capsule 0          Allergies:        Allergies   Allergen Reactions     Adhesive Tape      Cephalosporins Anaphylaxis     Naproxen Anaphylaxis     Penicillins      Augmentin Rash     Benadryl [Anti-Itch] Rash     Morphine Hcl Itching and Rash        Social History:     Social History     Tobacco Use     Smoking status: Current Some Day Smoker     Packs/day: 1.00     Years: 45.00     Pack years: 45.00     Smokeless tobacco: Never Used   Substance Use Topics     Alcohol use: Yes     Comment: occ       History   Drug Use No         Family History:       Family History   Problem Relation Age of Onset     Cerebrovascular Disease Mother      Alcoholism Father      Diabetes Sister      Diabetes Brother      Diabetes Brother      Diabetes Brother      Liver Disease No family hx of      Colon Cancer No family hx of          Physical Exam:     /68 (BP Location: Right arm)   Pulse 80   Temp 98.2  F (36.8  C) (Oral)   Resp 16   SpO2 97%   There is no height or weight on file to calculate BMI.    General Appearance: healthy and alert, no distress     HEENT: no thyromegaly, no palpable nodules or masses        Cardiovascular: regular rate and rhythm, no gallops, rubs or murmurs     Respiratory: lungs clear, no rales, rhonchi or wheezes, normal diaphragmatic excursion    Musculoskeletal: extremities non tender and without edema    Skin: no lesions or rashes     Neurological: W/c; right BKA     Psychiatric: appropriate mood and affect                               Hematological: normal cervical, supraclavicular lymph nodes     Gastrointestinal:       abdomen soft, obese, non-tender, non-distended    Genitourinary: External genitalia and urethral meatus appears normal.  Vagina is smooth without nodularity or masses; no blood in vaginal vault; moderate amount of white discharge consistent with candidiasis.  Cervix surgically absent.  Bimanual exam reveal no masses, nodularity or fullness--exam limited secondary to body habitus.   Rectal exam declined.       Assessment:    Fani Escobedo is a 64 year old woman with a diagnosis of stage 1A, grade 1 endometrioid adenocarcinoma.  she is here today for a surveillance visit. She was last seen in this clinic 8/2018.    20 minutes were spent with this patient, over 50% of that time was spent in symptom management, treatment planning and in counseling and coordination of care.      Plan:     1.)       Patient to RTC in 12 months for her next surveillance visit (she states she will see a provider in [place] in 6 months). She will be seen every 6 months until 8/2020 and then have annual pelvic/rectal exams after that. Reviewed recommendations from [facility] not to perform surveillance pap smears in women diagnosed with endometrial cancer as this does not improve detection of local recurrence. Reviewed signs and symptoms for when she should contact the clinic or seek additional care. Patient to contact the clinic with any questions or concerns in the interim.     2.) Genetic risk factors were assessed and her MMR proteins were intact.     3.) Labs and/or tests ordered include:  None.      4.) Health maintenance issues addressed today include annual health maintenance and non-gynecologic issues with PCP.    5.)        Vaginal candidiasis: Rx Diflucan. She denies any liver or kidney issues.     SHAUNA Bhandari, WHNP-BC, ANP-BC  Women's Health Nurse Practitioner  Adult Nurse Pracitioner  Division of Gynecologic Oncology          CC  Patient Care Team:  Marielos Nicholson DO as PCP - General (Family Practice)  Tip Ortega MD as PCP - Obstetrics/Gynecology (OB/Gyn)  Chapo Vargas MD as MD (Hepatology)      Again, thank you for allowing me to participate in the care of your patient.        Sincerely,        SHAUNA Govea CNP

## 2019-07-15 NOTE — TELEPHONE ENCOUNTER
Contacted patient to follow up if she had seen a provider in [place] as she had stated was her plan at her last visit Aug 2018. Patient states she has not followed up with gyn/onc elsewhere. Patient was unaware of appointment today. She expressed disappointment with not seeing Dr Hamilton today but explained to her Dr Hamilton is on maternity leave. Patient is overdue for her 6 month follow up and reports she did have some spotting last month. Encouraged patient to keep appointment today as bleeding is a symptom that should be assessed further. Patient agreed and will come to clinic today.  Xi Negron  RN, BSN, OCN

## 2019-07-15 NOTE — NURSING NOTE
"Oncology Rooming Note    July 15, 2019 2:34 PM   Fani Escobedo is a 64 year old female who presents for:    Chief Complaint   Patient presents with     Oncology Clinic Visit     Follow up     Initial Vitals: /68 (BP Location: Right arm)   Pulse 80   Temp 98.2  F (36.8  C) (Oral)   Resp 16   SpO2 97%  Estimated body mass index is 35.97 kg/m  as calculated from the following:    Height as of 12/10/18: 1.753 m (5' 9\").    Weight as of 6/24/19: 110.5 kg (243 lb 9.6 oz). There is no height or weight on file to calculate BSA.  Severe Pain (6) Comment: Data Unavailable   No LMP recorded. Patient has had a hysterectomy.  Allergies reviewed: Yes  Medications reviewed: Yes    Medications: Medication refills not needed today.  Pharmacy name entered into EPIC:    [facility] [place], [place] - [address] [place] [address] [address]  [id number] [place] [facility] [place] - [address] [id number] - [place], [place] - [address] AT [facility] [address] [place]    Bibiana Cortez LPN            "

## 2019-07-15 NOTE — PROGRESS NOTES
Follow Up Notes on Referred Patient    Date: 7/15/2019       RE: Fani Escobedo  : 1955  MARTHA: 7/15/2019      Fani Escobedo is a 64 year old woman with a diagnosis of stage 1A, grade 1 endometrioid adenocarcinoma. she is here today for a surveillance visit. She was last seen in this clinic 2018.     History:     18 D&C                Pathology revealed endometrioid adenocarcinoma, FIGO grade1.      18:   Robotic assisted total laparoscopic hysterectomy, bilateral salpingo-oophorectomy, bilateral pelvic lymph node dissection, washings, cystoscopy                Pathology:  Stage 1A, grade 2 endometrial adenocarcinoma, tumor size 2.6 cm, 5/18 mm myometrial invasion, + LVSI, 0/23 pelvic LN, normal MMR        Today she comes to clinic and states she has not seen anyone for a pelvic exam since she was in clinic last August. She reports she did see some spotting about 3 weeks ago; she states this happened 3 times and she believes it was from the vagina. She was seen by her PCP for urinary urgency/frequncy at this time and did have a trace amount of blood in her urine. She is on Bactrim daily. She does have urinary incontinence. Her BG have been <200. She continues to see GI for her cirrhosis. She is declining further breast cancer screening. She is current with seeing her PCP for her annual exam. She states she is able to transfer to the exam table.         Review of Systems:    Systemic           no weight changes; no fever; no chills; no night sweats; no appetite changes  Skin           no rashes, or lesions  Eye           no irritation; no changes in vision  Dilma-Laryngeal           no dysphagia; no hoarseness   Pulmonary    no cough; no shortness of breath  Cardiovascular    no chest pain; no palpitations  Gastrointestinal    no diarrhea; no constipation; no abdominal pain; no changes in bowel habits; no blood in stool  Genitourinary   no urinary frequency; no urinary urgency; no dysuria; no  pain; no abnormal vaginal discharge; no abnormal vaginal bleeding  Breast    no breast discharge; no breast changes; no breast pain  Musculoskeletal    no myalgias; no arthralgias; no back pain  Psychiatric           no depressed mood; no anxiety    Hematologic              no tender lymph nodes; no noticeable swellings or lumps   Endocrine    no hot flashes; no heat/cold intolerance; + DM         Neurological   no tremor; no numbness and tingling; no headaches; no difficulty sleeping      Past Medical History:    Past Medical History:   Diagnosis Date     Charcot foot due to diabetes mellitus (H)     (R) foot -> BKA     COPD (chronic obstructive pulmonary disease) (H)      Depression      Diabetes mellitus (H)     type II     Diabetic neuropathy (H)      Endometrial cancer (H)      Hypothyroid      Iron deficiency anemia      Obesity     bmi 46     USHA (obstructive sleep apnea)     does not use CPAP     Osteomyelitis (H)      Tobacco abuse          Past Surgical History:    Past Surgical History:   Procedure Laterality Date     AMPUTATE LEG BELOW KNEE  4/14/2012    Procedure:AMPUTATE LEG BELOW KNEE; right leg below knee amputation; Surgeon:WILMAR LUI; Location: OR     AMPUTATE TOE(S)  5/1/2013    Procedure: AMPUTATE TOE(S);  PARTIAL LEFT 3RD TOE AMPUTATION;  Surgeon: Juancarlos Gamez DPM;  Location:  OR     AMPUTATION      (L) 2nd toe     ANESTHESIA OUT OF OR MRI  6/11/2013    Procedure: ANESTHESIA OUT OF OR MRI;  MRI UNDER GENERAL ANESTHESIA;  Surgeon: Provider, Generic Anesthesia;  Location:  OR     ANESTHESIA OUT OF OR MRI  9/4/2013    Procedure: ANESTHESIA OUT OF OR MRI;  ANESTHESIA OUT OF OR MRI;  Surgeon: Provider, Generic Anesthesia;  Location:  OR     BONE MARROW BIOPSY, BONE SPECIMEN, NEEDLE/TROCAR N/A 6/7/2018    Procedure: BIOPSY BONE MARROW;  bone marrow biopsy;  Surgeon: Farzana Teixeira MD;  Location:  GI     CYSTOSCOPY N/A 8/8/2018    Procedure: CYSTOSCOPY;;  Surgeon: Dana  Trudy Vázquez MD;  [facility]     DAVINCI HYSTERECTOMY TOTAL, BILATERAL SALPINGO-OOPHORECTOMY, COMBINED N/A 8/8/2018    Procedure: COMBINED DAVINCI HYSTERECTOMY TOTAL, SALPINGO-OOPHORECTOMY;  DaVinci Assisted Total Laparoscopic Hysterectomy, Bilateral Salpingo-Oophorectomy, Cystoscopy, Bilateral Pelvic Lymph Node Dissection;  Surgeon: Trudy Cortez MD;  [facility]     DILATION AND CURETTAGE N/A 6/6/2018    Procedure: DILATION AND CURETTAGE;  DILATION AND CURETTAGE ;  Surgeon: Tip Ortega MD;  Location:  OR     ESOPHAGOSCOPY, GASTROSCOPY, DUODENOSCOPY (EGD), COMBINED N/A 12/20/2018    Procedure: COMBINED ESOPHAGOSCOPY, GASTROSCOPY, DUODENOSCOPY (EGD);  Surgeon: Chapo Vargas MD;  [facility]     EXPLORE SPINE, REMOVE HARDWARE, COMBINED  10/11/2013    Procedure: COMBINED EXPLORE SPINE, REMOVE HARDWARE;  EXPLORATION AND REVISION POSTERIOR THORACIC WOUND WITH WOUND VAC PLACEMENT.;  Surgeon: Thomas Ellis MD;  Location:  OR     FUSION SPINE ANTERIOR THORACIC ONE LEVEL  9/5/2013    Procedure: FUSION SPINE ANTERIOR THORACIC ONE LEVEL;  Right Thoracotomy For  T7-T8 Thorasic Vertebral Body Resection with Strut Graft, [place] Instrumentation T6-T9, BMP, Pyramesh and Intra-operative Neuro Monitoring ;  Surgeon: Thomas Ellis MD;  Location:  OR     HYSTERECTOMY RADICAL, SALPINGO-OOPHORECTOMY, NODE DISSECTION  08/08/2018    DaVinci assisted TLH, BSO, BPLND; Surgeon: Trudy Cortez MD     INCISION AND DRAINAGE RECTUM, COMBINED  6/11/2012    Procedure: COMBINED INCISION AND DRAINAGE RECTUM;  I&D ABSCESS RIGHT BUTTOCK (MRSA );  Surgeon: Christos Linton MD;  Location:  OR     INCISION AND DRAINAGE SPINE, CLOSE WOUND, COMBINED  10/14/2013    Procedure: COMBINED INCISION AND DRAINAGE SPINE, CLOSE WOUND;  EXPLORATION/REVISION POSTERIOR THORACIC WOUND COMPLEX 15 CM. ;  Surgeon: Thomas Ellis MD;  Location:  OR     IRRIGATION AND DEBRIDEMENT TRUNK,  COMBINED  6/10/2012    Procedure: COMBINED IRRIGATION AND DEBRIDEMENT TRUNK;  Incision and drainage abscess right buttock;  Surgeon: Christos Linton MD;  Location: [facility] OR     OPTICAL TRACKING SYSTEM FUSION POSTERIOR SPINE THORACIC THREE+ LEVELS  9/6/2013    Procedure: OPTICAL TRACKING SYSTEM FUSION POSTERIOR SPINE THORACIC THREE+ LEVELS;  T4-T11 POSTERIOR LATERAL PEDICLE SCREW INSTRUMENTATION WITH IMAGE GUIDANCE AND NEURO-MONITORING;  Surgeon: Thomas Ellis MD;  Location:  OR         Health Maintenance Due   Topic Date Due     PREVENTIVE CARE VISIT  1955     MICROALBUMIN  1955     SPIROMETRY  1955     ADVANCE CARE PLANNING  1955     COPD ACTION PLAN  1955     EYE EXAM  1955     MAMMO SCREENING  1955     PAP  1955     COLONOSCOPY  04/19/1965     HIV SCREENING  04/19/1970     ZOSTER IMMUNIZATION (1 of 2) 04/19/2005     DIABETIC FOOT EXAM  12/06/2018     A1C  02/08/2019     PHQ-9  02/15/2019     LIPID  06/19/2019       Current Medications:     Current Outpatient Medications   Medication Sig Dispense Refill     fluconazole (DIFLUCAN) 150 MG tablet Take 1 tablet (150 mg) by mouth once for 1 dose 1 tablet 0     furosemide (LASIX) 40 MG tablet Take 2 tablets (80 mg) by mouth daily 180 tablet 3     GABAPENTIN PO Take 1,200 mg by mouth 3 times daily 2 x 600 mg tab       Insulin Aspart (NOVOLOG SC) Inject 28 Units Subcutaneous daily (with breakfast)       Insulin Aspart (NOVOLOG SC) Inject 30 Units Subcutaneous daily (with lunch)       Insulin Aspart (NOVOLOG SC) Inject 28 Units Subcutaneous daily (with dinner)       insulin detemir (LEVEMIR FLEXPEN/FLEXTOUCH) 100 UNIT/ML injection Inject 42 Units Subcutaneous 2 times daily       spironolactone (ALDACTONE) 100 MG tablet Take 2 tablets (200 mg) by mouth daily 180 tablet 3     sulfamethoxazole-trimethoprim (BACTRIM DS/SEPTRA DS) 800-160 MG per tablet Take 1 tablet by mouth 2 times daily       B Complex Vitamins  (VITAMIN  B COMPLEX) tablet Take 1 tablet by mouth daily.       ferrous sulfate (IRON) 325 (65 Fe) MG tablet Take 1 tablet (325 mg) by mouth daily (Patient not taking: Reported on 7/15/2019) 30 tablet 0     HYDROmorphone (DILAUDID) 2 MG tablet Take 1-2 tablets (2-4 mg) by mouth every 6 hours as needed for moderate to severe pain (Patient not taking: Reported on 7/15/2019) 10 tablet 0     Lactobacillus (ACIDOPHILUS PO) Take 1 capsule by mouth daily       LEVOTHYROXINE SODIUM PO Take 50 mcg by mouth daily        METFORMIN HCL PO Take 1,000 mg by mouth 2 times daily (with meals)       nitroFURantoin, macrocrystal-monohydrate, (MACROBID) 100 MG capsule Take 1 capsule (100 mg) by mouth 2 times daily (Patient not taking: Reported on 7/15/2019) 14 capsule 0     order for DME Equipment being ordered: Ostomy supplies,Misc  [place] ostomy drainable pouches. #[id number] (Patient not taking: Reported on 7/15/2019) 10 each 1     phenazopyridine (AZO) 97.5 MG tablet Take 2 tablets (195 mg) by mouth 3 times daily (Patient not taking: Reported on 7/15/2019) 12 tablet 0     polyethylene glycol (MIRALAX) powder Take one to two capfuls a day by mouth. Adjust dose so having 2-3 bowel movements a day. (Patient not taking: Reported on 7/15/2019) 238 g 3     potassium chloride (KLOR-CON) 8 MEQ CR tablet Take 16 mEq by mouth 2 times daily AM and PM in addition to afternoon dose       senna-docusate (SENOKOT-S;PERICOLACE) 8.6-50 MG per tablet Take 2 tablets by mouth 2 times daily as needed for constipation (Patient not taking: Reported on 7/15/2019) 100 tablet 0     Simethicone 125 MG CAPS Take 125 mg by mouth 2 times daily as needed (Patient not taking: Reported on 7/15/2019) 28 capsule 0         Allergies:        Allergies   Allergen Reactions     Adhesive Tape      Cephalosporins Anaphylaxis     Naproxen Anaphylaxis     Penicillins      Augmentin Rash     Benadryl [Anti-Itch] Rash     Morphine Hcl Itching and Rash        Social History:      Social History     Tobacco Use     Smoking status: Current Some Day Smoker     Packs/day: 1.00     Years: 45.00     Pack years: 45.00     Smokeless tobacco: Never Used   Substance Use Topics     Alcohol use: Yes     Comment: occ       History   Drug Use No         Family History:       Family History   Problem Relation Age of Onset     Cerebrovascular Disease Mother      Alcoholism Father      Diabetes Sister      Diabetes Brother      Diabetes Brother      Diabetes Brother      Liver Disease No family hx of      Colon Cancer No family hx of          Physical Exam:     /68 (BP Location: Right arm)   Pulse 80   Temp 98.2  F (36.8  C) (Oral)   Resp 16   SpO2 97%   There is no height or weight on file to calculate BMI.    General Appearance: healthy and alert, no distress     HEENT: no thyromegaly, no palpable nodules or masses        Cardiovascular: regular rate and rhythm, no gallops, rubs or murmurs     Respiratory: lungs clear, no rales, rhonchi or wheezes, normal diaphragmatic excursion    Musculoskeletal: extremities non tender and without edema    Skin: no lesions or rashes     Neurological: W/c; right BKA     Psychiatric: appropriate mood and affect                               Hematological: normal cervical, supraclavicular lymph nodes     Gastrointestinal:       abdomen soft, obese, non-tender, non-distended    Genitourinary: External genitalia and urethral meatus appears normal.  Vagina is smooth without nodularity or masses; no blood in vaginal vault; moderate amount of white discharge consistent with candidiasis.  Cervix surgically absent.  Bimanual exam reveal no masses, nodularity or fullness--exam limited secondary to body habitus.  Rectal exam declined.       Assessment:    Fani Escobedo is a 64 year old woman with a diagnosis of stage 1A, grade 1 endometrioid adenocarcinoma. she is here today for a surveillance visit. She was last seen in this clinic 8/2018.    20 minutes were spent with  this patient, over 50% of that time was spent in symptom management, treatment planning and in counseling and coordination of care.      Plan:     1.)       Patient to RTC in 12 months for her next surveillance visit (she states she will see a provider in [place] in 6 months). She will be seen every 6 months until 8/2020 and then have annual pelvic/rectal exams after that. Reviewed recommendations from [facility] not to perform surveillance pap smears in women diagnosed with endometrial cancer as this does not improve detection of local recurrence. Reviewed signs and symptoms for when she should contact the clinic or seek additional care. Patient to contact the clinic with any questions or concerns in the interim.     2.) Genetic risk factors were assessed and her MMR proteins were intact.     3.) Labs and/or tests ordered include:  None.      4.) Health maintenance issues addressed today include annual health maintenance and non-gynecologic issues with PCP.    5.)        Vaginal candidiasis: Rx Diflucan. She denies any liver or kidney issues.     SHAUNA Bhandari, WHNP-BC, ANP-BC  Women's Health Nurse Practitioner  Adult Nurse Pracitioner  Division of Gynecologic Oncology          CC  Patient Care Team:  Marielos Nicholson DO as PCP - General (Family Practice)  Tip Ortega MD as PCP - Obstetrics/Gynecology (OB/Gyn)  Chapo Vargas MD as MD (Hepatology)

## 2019-07-18 ENCOUNTER — TELEPHONE (OUTPATIENT)
Dept: GASTROENTEROLOGY | Facility: CLINIC | Age: 64
End: 2019-07-18

## 2019-07-18 NOTE — TELEPHONE ENCOUNTER
[facility] Call Center    Phone Message    May a detailed message be left on voicemail: yes    Reason for Call: Other: Moon Carpenter called Fani to ask when her last colonoscopy was.  Fani states that she cannot say when she last had a colonoscopy, it has been a numbner of years.  However, records should be with [facility].     Action Taken: Message routed to:  [facility]

## 2019-07-24 ENCOUNTER — PATIENT OUTREACH (OUTPATIENT)
Dept: GASTROENTEROLOGY | Facility: CLINIC | Age: 64
End: 2019-07-24

## 2019-07-24 DIAGNOSIS — K74.60 CIRRHOSIS OF LIVER WITHOUT ASCITES, UNSPECIFIED HEPATIC CIRRHOSIS TYPE (H): Primary | ICD-10-CM

## 2019-07-30 ENCOUNTER — TELEPHONE (OUTPATIENT)
Dept: ONCOLOGY | Facility: CLINIC | Age: 64
End: 2019-07-30

## 2019-07-30 NOTE — TELEPHONE ENCOUNTER
Tobacco Treatment Team at the [facility] spoke with Ms. Escobedo on 7/30/2019 regarding the tobacco cessation program and she declined to participate with the program.No further calls needed.    KANWAL Garcia  Tobacco Treatment Specialist  PH: [phone]

## 2019-08-23 ENCOUNTER — TELEPHONE (OUTPATIENT)
Dept: GASTROENTEROLOGY | Facility: CLINIC | Age: 64
End: 2019-08-23

## 2019-08-23 NOTE — TELEPHONE ENCOUNTER
Patient Name: Fani Escobedo   : 1955  MRN: [id number]       : [x] N/A        with information needed to complete pre-assessment call.  Request pt contact Endoscopy Pre-assessment RN to complete upcoming procedure information.  Telephone call-back number provided.    Chelsey Salguero, RN, RN  [facility]    Additional Information regarding appointment:      Patient scheduled for:  [x] EGD      Indication for procedure.  [x] Liver cirrhosis secondary to SIN (H)     Sedation Type:    [x] MAC       Procedure Provider:  [place]      Referring Provider. Moon Carpenter    Arrival time verified: 19    Facility location verified:   [x][facility] Endoscopy Unit - [address], [address]st Floor, [phone]    Pt meets medical necessity for outpatient procedure in hospital Endoscopy Unit:     [x] N/A for this Payor (non-BCBS)      Prep Type:   [x]NPO /p 0900, No solid food /p 2200 the night before  Anticoagulants or blood thinners: [x]None     Electronic implanted devices: [x] No      H&P / Pre op physical completed: [] N/A, [] Complete, Date , [] Scheduled, Date , [x] No, 19    Additional Information: Included need for H&P by Wednesday in     _______________________________________________

## 2019-08-28 RX ORDER — ONDANSETRON 2 MG/ML
4 INJECTION INTRAMUSCULAR; INTRAVENOUS
Status: CANCELLED | OUTPATIENT
Start: 2019-08-28

## 2019-08-28 RX ORDER — LIDOCAINE 40 MG/G
CREAM TOPICAL
Status: CANCELLED | OUTPATIENT
Start: 2019-08-28

## 2019-08-29 ENCOUNTER — ANESTHESIA EVENT (OUTPATIENT)
Dept: GASTROENTEROLOGY | Facility: CLINIC | Age: 64
End: 2019-08-29

## 2019-08-29 ENCOUNTER — ANESTHESIA (OUTPATIENT)
Dept: GASTROENTEROLOGY | Facility: CLINIC | Age: 64
End: 2019-08-29

## 2019-08-29 ASSESSMENT — LIFESTYLE VARIABLES: TOBACCO_USE: 1

## 2019-08-29 ASSESSMENT — COPD QUESTIONNAIRES
COPD: 1
CAT_SEVERITY: MILD

## 2019-08-29 NOTE — ANESTHESIA PREPROCEDURE EVALUATION
Anesthesia Pre-Procedure Evaluation    Patient: Fani Escobedo   MRN:     [id number] Gender:   female   Age:    64 year old :      1955        Preoperative Diagnosis: Liver cirrhosis secondary to SIN (H) [K75.81, K74.60] ;EGD w/MAC- prep mailed   Procedure(s):  ESOPHAGOGASTRODUODENOSCOPY (EGD)     Past Medical History:   Diagnosis Date     Charcot foot due to diabetes mellitus (H)     (R) foot -> BKA     COPD (chronic obstructive pulmonary disease) (H)      Depression      Diabetes mellitus (H)     type II     Diabetic neuropathy (H)      Endometrial cancer (H)      Hypothyroid      Iron deficiency anemia      Obesity     bmi 46     USHA (obstructive sleep apnea)     does not use CPAP     Osteomyelitis (H)      Tobacco abuse       Past Surgical History:   Procedure Laterality Date     AMPUTATE LEG BELOW KNEE  2012    Procedure:AMPUTATE LEG BELOW KNEE; right leg below knee amputation; Surgeon:WILMAR LUI; Location: OR     AMPUTATE TOE(S)  2013    Procedure: AMPUTATE TOE(S);  PARTIAL LEFT 3RD TOE AMPUTATION;  Surgeon: Juancarlos Gamez DPM;  Location:  OR     AMPUTATION      (L) 2nd toe     ANESTHESIA OUT OF OR MRI  2013    Procedure: ANESTHESIA OUT OF OR MRI;  MRI UNDER GENERAL ANESTHESIA;  Surgeon: Provider, Generic Anesthesia;  Location:  OR     ANESTHESIA OUT OF OR MRI  2013    Procedure: ANESTHESIA OUT OF OR MRI;  ANESTHESIA OUT OF OR MRI;  Surgeon: Provider, Generic Anesthesia;  Location:  OR     BONE MARROW BIOPSY, BONE SPECIMEN, NEEDLE/TROCAR N/A 2018    Procedure: BIOPSY BONE MARROW;  bone marrow biopsy;  Surgeon: Farzana Teixeira MD;  Location:  GI     CYSTOSCOPY N/A 2018    Procedure: CYSTOSCOPY;;  Surgeon: Trudy Cortez MD;  [facility]     DAVINCI HYSTERECTOMY TOTAL, BILATERAL SALPINGO-OOPHORECTOMY, COMBINED N/A 2018    Procedure: COMBINED DAVINCI HYSTERECTOMY TOTAL, SALPINGO-OOPHORECTOMY;  DaVinci Assisted Total Laparoscopic  Hysterectomy, Bilateral Salpingo-Oophorectomy, Cystoscopy, Bilateral Pelvic Lymph Node Dissection;  Surgeon: Trudy Cortez MD;  [facility]     DILATION AND CURETTAGE N/A 6/6/2018    Procedure: DILATION AND CURETTAGE;  DILATION AND CURETTAGE ;  Surgeon: Tip Ortega MD;  Location:  OR     ESOPHAGOSCOPY, GASTROSCOPY, DUODENOSCOPY (EGD), COMBINED N/A 12/20/2018    Procedure: COMBINED ESOPHAGOSCOPY, GASTROSCOPY, DUODENOSCOPY (EGD);  Surgeon: Chapo Vargas MD;  [facility]     EXPLORE SPINE, REMOVE HARDWARE, COMBINED  10/11/2013    Procedure: COMBINED EXPLORE SPINE, REMOVE HARDWARE;  EXPLORATION AND REVISION POSTERIOR THORACIC WOUND WITH WOUND VAC PLACEMENT.;  Surgeon: Thomas Ellis MD;  Location:  OR     FUSION SPINE ANTERIOR THORACIC ONE LEVEL  9/5/2013    Procedure: FUSION SPINE ANTERIOR THORACIC ONE LEVEL;  Right Thoracotomy For  T7-T8 Thorasic Vertebral Body Resection with Strut Graft, [place] Instrumentation T6-T9, BMP, Pyramesh and Intra-operative Neuro Monitoring ;  Surgeon: Thomas Ellis MD;  Location:  OR     HYSTERECTOMY RADICAL, SALPINGO-OOPHORECTOMY, NODE DISSECTION  08/08/2018    DaVinci assisted TLH, BSO, BPLND; Surgeon: Trudy Cortez MD     INCISION AND DRAINAGE RECTUM, COMBINED  6/11/2012    Procedure: COMBINED INCISION AND DRAINAGE RECTUM;  I&D ABSCESS RIGHT BUTTOCK (MRSA );  Surgeon: Chritsos Linton MD;  Location:  OR     INCISION AND DRAINAGE SPINE, CLOSE WOUND, COMBINED  10/14/2013    Procedure: COMBINED INCISION AND DRAINAGE SPINE, CLOSE WOUND;  EXPLORATION/REVISION POSTERIOR THORACIC WOUND COMPLEX 15 CM. ;  Surgeon: Thomas Ellis MD;  Location:  OR     IRRIGATION AND DEBRIDEMENT TRUNK, COMBINED  6/10/2012    Procedure: COMBINED IRRIGATION AND DEBRIDEMENT TRUNK;  Incision and drainage abscess right buttock;  Surgeon: Christos Linton MD;  Location:  OR     OPTICAL TRACKING SYSTEM FUSION POSTERIOR SPINE THORACIC  THREE+ LEVELS  9/6/2013    Procedure: OPTICAL TRACKING SYSTEM FUSION POSTERIOR SPINE THORACIC THREE+ LEVELS;  T4-T11 POSTERIOR LATERAL PEDICLE SCREW INSTRUMENTATION WITH IMAGE GUIDANCE AND NEURO-MONITORING;  Surgeon: Thomas Ellis MD;  Location: [facility] OR          Anesthesia Evaluation     . Pt has had prior anesthetic. Type: General and MAC    No history of anesthetic complications          ROS/MED HX    ENT/Pulmonary:     (+)sleep apnea, tobacco use, Current use 1 PPD for 45 years packs/day  mild COPD, doesn't use CPAP , . .    Neurologic:     (+)neuropathy - Diabetic peripheral neuropathy,     Cardiovascular:     (+) Dyslipidemia, -Peripheral Vascular Disease---. : . . . :. . Previous cardiac testing date:results:date: results:ECG reviewed date:6/5/18 results:NSRSinus rhythm 86  Left axis deviation  Cannot exclude Septal infarct (cited on or before 27-APR-2011)  Possible Lateral infarct (cited on or before 27-APR-2011)  Abnormal ECG  When compared with ECG of 15-SEP-2013 05:38,  Premature ventricular complexes are no longer Present  Questionable change in initial forces of Anterolateral leads date: results:          METS/Exercise Tolerance:  1 - Eating, dressing   Hematologic: Comments: Pancytopenia requiring bone marrow biopsy    (+) Other Hematologic Disorder-Pancytopenia      Musculoskeletal:   (+) arthritis,  other musculoskeletal- right BKA      GI/Hepatic: Comment: SIN  Cirrhosis  Portal HTN       (-) GERD   Renal/Genitourinary:  - ROS Renal section negative       Endo: Comment: BMI 38    (+) type II DM Using insulin Normal glucose range: 180-350 Diabetic complications: neuropathy, thyroid problem hypothyroidism, Obesity (BMI=35), .      Psychiatric:     (+) psychiatric history depression      Infectious Disease:   (+) MRSA,       Malignancy:   (+) Malignancy History of Other  Other CA endometrial  Remission status post Surgery         Other:    (+) No chance of pregnancy C-spine cleared: N/A, H/O  Chronic Pain,H/O chronic opiod use , other significant disability Wheelchair bound                       PHYSICAL EXAM:   Mental Status/Neuro:    Airway:    Respiratory:    CV:    Comments:       Habitus: Obesity               LABS:  CBC:   Lab Results   Component Value Date    WBC 3.8 (L) 06/24/2019    WBC 5.1 12/10/2018    HGB 11.4 (L) 06/24/2019    HGB 13.0 12/10/2018    HCT 33.9 (L) 06/24/2019    HCT 38.0 12/10/2018    PLT 39 (LL) 06/24/2019    PLT 50 (L) 12/10/2018     BMP:   Lab Results   Component Value Date     06/24/2019     (L) 11/01/2018    POTASSIUM 4.0 06/24/2019    POTASSIUM 5.2 11/01/2018    CHLORIDE 104 06/24/2019    CHLORIDE 96 11/01/2018    CO2 23 06/24/2019    CO2 23 11/01/2018    BUN 14 06/24/2019    BUN 24 11/01/2018    CR 0.77 06/24/2019    CR 0.97 11/01/2018     (H) 06/24/2019     (H) 11/01/2018     COAGS:   Lab Results   Component Value Date    PTT 30 08/08/2018    INR 1.16 (H) 06/24/2019    FIBR 321 08/08/2018     POC:   Lab Results   Component Value Date     (H) 12/20/2018     OTHER:   Lab Results   Component Value Date    PH 7.35 09/05/2013    LACT 1.9 04/30/2013    A1C 6.8 (H) 08/08/2018    DESIREE 8.9 06/24/2019    PHOS 3.1 09/06/2013    MAG 1.8 09/16/2013    ALBUMIN 3.3 (L) 06/24/2019    PROTTOTAL 7.2 06/24/2019    ALT 27 06/24/2019    AST 38 06/24/2019    ALKPHOS 94 06/24/2019    BILITOTAL 0.8 06/24/2019    LIPASE 38 10/14/2013    AMYLASE <30 (L) 10/14/2013    TSH 1.24 06/05/2018    CRP 27.5 (H) 10/12/2013    SED 27 10/12/2013        Preop Vitals    BP Readings from Last 3 Encounters:   07/15/19 107/68   06/24/19 106/62   12/20/18 113/56    Pulse Readings from Last 3 Encounters:   07/15/19 80   06/24/19 73   12/20/18 85      Resp Readings from Last 3 Encounters:   07/15/19 16   12/20/18 9   11/01/18 20    SpO2 Readings from Last 3 Encounters:   07/15/19 97%   06/24/19 98%   12/20/18 96%      Temp Readings from Last 1 Encounters:   07/15/19 36.8  C (98.2  F)  "(Oral)    Ht Readings from Last 1 Encounters:   12/10/18 1.753 m (5' 9\")      Wt Readings from Last 1 Encounters:   06/24/19 110.5 kg (243 lb 9.6 oz)    Estimated body mass index is 35.97 kg/m  as calculated from the following:    Height as of 12/10/18: 1.753 m (5' 9\").    Weight as of 6/24/19: 110.5 kg (243 lb 9.6 oz).     LDA:        Assessment:   ASA SCORE: 3    H&P: History and physical reviewed and following examination; no interval change.   Smoking Status:  Active Smoker   NPO Status: NPO Appropriate     Plan:   Anes. Type:  MAC   Pre-Medication: None   Induction:  N/a   Airway: Native Airway   Access/Monitoring: PIV   Maintenance: TIVA     Postop Plan:   Postop Pain: None  Postop Sedation/Airway: Not planned     PONV Management: Adult Risk Factors: Female   Prevention:, No Volatiles     CONSENT:      Blood Products: Consent Deferred (Minimal Blood Loss)                   Su Nair MD  "

## 2020-03-19 DIAGNOSIS — K75.81 LIVER CIRRHOSIS SECONDARY TO NASH (H): Primary | ICD-10-CM

## 2020-03-19 DIAGNOSIS — K74.60 LIVER CIRRHOSIS SECONDARY TO NASH (H): Primary | ICD-10-CM

## 2020-10-20 ENCOUNTER — PATIENT OUTREACH (OUTPATIENT)
Dept: GASTROENTEROLOGY | Facility: CLINIC | Age: 65
End: 2020-10-20

## 2020-10-20 NOTE — PROGRESS NOTES
Attempted to reach patient to discuss need for follow up in the hepatology clinic in order to continue with medication refills, no answer, message left requesting call back, number provided.

## 2020-10-23 DIAGNOSIS — K74.60 HEPATIC CIRRHOSIS, UNSPECIFIED HEPATIC CIRRHOSIS TYPE (H): ICD-10-CM

## 2020-10-23 RX ORDER — FUROSEMIDE 40 MG
80 TABLET ORAL DAILY
Qty: 120 TABLET | Refills: 0 | Status: SHIPPED | OUTPATIENT
Start: 2020-10-23

## 2023-01-13 NOTE — IP AVS SNAPSHOT
[facility], [place], Endoscopy    [address] [address]    Phone:  [phone]                                       After Visit Summary   11/1/2018    Fani Escobedo    MRN: [id number]           After Visit Summary Signature Page     I have received my discharge instructions, and my questions have been answered. I have discussed any challenges I see with this plan with the nurse or doctor.    ..........................................................................................................................................  Patient/Patient Representative Signature      ..........................................................................................................................................  Patient Representative Print Name and Relationship to Patient    ..................................................               ................................................  Date                                   Time    ..........................................................................................................................................  Reviewed by Signature/Title    ...................................................              ..............................................  Date                                               Time          22EPIC Rev 08/18         Detail Level: Detailed

## (undated) DEVICE — ENDO POUCH UNIVERSAL RETRIEVAL SYSTEM INZII 5MM CD003

## (undated) DEVICE — SOL WATER IRRIG 1000ML BOTTLE 2F7114

## (undated) DEVICE — NDL BLUNT 18GA 1" W/O FILTER 305181

## (undated) DEVICE — LINEN TOWEL PACK X30 5481

## (undated) DEVICE — DEVICE SUTURE GRASPER TROCAR CLOSURE 14GA PMITCSG

## (undated) DEVICE — DRSG PRIMAPORE 02X3" 7133

## (undated) DEVICE — DRAPE SHEET REV FOLD 3/4 9349

## (undated) DEVICE — PREP TECHNI-CARE CHLOROXYLENOL 3% 4OZ BOTTLE C222-4ZWO

## (undated) DEVICE — NDL INSUFFLATION 13GA 120MM C2201

## (undated) DEVICE — SU VICRYL 2-0 CT-2 27" J333H

## (undated) DEVICE — SPONGE LAP 18X18" X8435

## (undated) DEVICE — TUBING IRRIG CYSTO/BLADDER SET 81" LF 2C4040

## (undated) DEVICE — WIPES FOLEY CARE SURESTEP PROVON DFC100

## (undated) DEVICE — JELLY LUBRICATING SURGILUBE 2OZ TUBE

## (undated) DEVICE — DRAPE MAYO STAND 23X54 8337

## (undated) DEVICE — DAVINCI OBTURATOR 8MM BLUNT LONG 470009

## (undated) DEVICE — PACK TVT HYSTEROSCOPY SMA15HYFSE

## (undated) DEVICE — SUCTION MANIFOLD DORNOCH ULTRA CART UL-CL500

## (undated) DEVICE — DAVINCI XI NDL DRIVER MEGA SUTURE CUT 8MM 470309

## (undated) DEVICE — SYR 10ML LL W/O NDL 302995

## (undated) DEVICE — GLOVE PROTEXIS POWDER FREE SMT 7.0  2D72PT70X

## (undated) DEVICE — CATH INTERMITTENT CLEAN-CATH FEMALE 14FR 6" VINYL LF 420614

## (undated) DEVICE — PEN MARKING SKIN

## (undated) DEVICE — PACK GOWN 3/PK DISP XL SBA32GPFCB

## (undated) DEVICE — NDL SPINAL 22GA 3.5" QUINCKE 405181

## (undated) DEVICE — LINEN TOWEL PACK X6 WHITE 5487

## (undated) DEVICE — SU WND CLOSURE VLOC 90 ABS 2-0 UND 9" GS-22 VLOCM2245

## (undated) DEVICE — SPECIMEN CONTAINER 60MLW/10% FORMALIN 59601

## (undated) DEVICE — ENDO APPLICATOR SURGIFLO PLASMA COBLATION MS1995

## (undated) DEVICE — ENDO TROCAR FIRST ENTRY KII FIOS Z-THRD 12X100MM CTF73

## (undated) DEVICE — SOL NACL 0.9% INJ 1000ML BAG 07983-09

## (undated) DEVICE — SYR 10ML FINGER CONTROL W/O NDL 309695

## (undated) DEVICE — GLOVE PROTEXIS BLUE W/NEU-THERA 7.0  2D73EB70

## (undated) DEVICE — KOH COLPOTOMIZER OCCLUDER  CPO-6

## (undated) DEVICE — GLOVE PROTEXIS POWDER FREE SMT 6.5  2D72PT65X

## (undated) DEVICE — PROTECTOR ARM ONE-STEP TRENDELENBURG 40418

## (undated) DEVICE — SOL ADH LIQUID BENZOIN SWAB 0.6ML C1544

## (undated) DEVICE — DAVINCI XI DRAPE COLUMN 470341

## (undated) DEVICE — RETR ELEV / UTERINE MANIPULATOR V-CARE LG CUP 60-6085-202A

## (undated) DEVICE — SPECIMEN TRAP MUCOUS 40ML LUKI C30200A

## (undated) DEVICE — DAVINCI HOT SHEARS TIP COVER  400180

## (undated) DEVICE — TUBING CONMED AIRSEAL SMOKE EVAC INSUFFLATION ASM-EVAC

## (undated) DEVICE — SYSTEM CLEARIFY VISUALIZATION 21-345

## (undated) DEVICE — SU VICRYL 0 TIE 54" J608H

## (undated) DEVICE — LINEN TOWEL PACK X5 5464

## (undated) DEVICE — DAVINCI XI GRASPER ENDOWRIST PROGRASP 470093

## (undated) DEVICE — DAVINCI XI FCP BIPOLAR FENESTRATED 470205

## (undated) DEVICE — DAVINCI XI DRAPE ARM 470015

## (undated) DEVICE — SUCTION IRR STRYKERFLOW II W/TIP 250-070-520

## (undated) DEVICE — GLOVE PROTEXIS W/NEU-THERA 6.5  2D73TE65

## (undated) DEVICE — ENDO POUCH UNIV RETRIEVAL SYSTEM INZII 10MM CD001

## (undated) DEVICE — ENDO OBTURATOR ACCESS PORT BLADELESS 8X100MM IAS8-100LP

## (undated) DEVICE — DAVINCI XI SEAL UNIVERSAL 5-8MM 470361

## (undated) DEVICE — KIT PATIENT POSITIONING PIGAZZI LATEX FREE 40580

## (undated) DEVICE — ENDO RETRACTOR PADDLE 12MM  173046

## (undated) DEVICE — ESU GROUND PAD ADULT W/CORD E7507

## (undated) DEVICE — DAVINCI XI MONOPOLAR SCISSORS HOT SHEARS 8MM 470179

## (undated) DEVICE — Device

## (undated) DEVICE — SU VICRYL 4-0 PS-2 18" UND J496H

## (undated) RX ORDER — SODIUM CHLORIDE, SODIUM LACTATE, POTASSIUM CHLORIDE, CALCIUM CHLORIDE 600; 310; 30; 20 MG/100ML; MG/100ML; MG/100ML; MG/100ML
INJECTION, SOLUTION INTRAVENOUS
Status: DISPENSED
Start: 2018-08-08

## (undated) RX ORDER — IPRATROPIUM BROMIDE AND ALBUTEROL SULFATE 2.5; .5 MG/3ML; MG/3ML
SOLUTION RESPIRATORY (INHALATION)
Status: DISPENSED
Start: 2018-08-08

## (undated) RX ORDER — PROPOFOL 10 MG/ML
INJECTION, EMULSION INTRAVENOUS
Status: DISPENSED
Start: 2018-08-08

## (undated) RX ORDER — EPHEDRINE SULFATE 50 MG/ML
INJECTION, SOLUTION INTRAMUSCULAR; INTRAVENOUS; SUBCUTANEOUS
Status: DISPENSED
Start: 2018-08-08

## (undated) RX ORDER — PROPOFOL 10 MG/ML
INJECTION, EMULSION INTRAVENOUS
Status: DISPENSED
Start: 2018-06-06

## (undated) RX ORDER — CLINDAMYCIN PHOSPHATE 900 MG/50ML
INJECTION, SOLUTION INTRAVENOUS
Status: DISPENSED
Start: 2018-08-08

## (undated) RX ORDER — PHENAZOPYRIDINE HYDROCHLORIDE 200 MG/1
TABLET, FILM COATED ORAL
Status: DISPENSED
Start: 2018-06-06

## (undated) RX ORDER — FENTANYL CITRATE 50 UG/ML
INJECTION, SOLUTION INTRAMUSCULAR; INTRAVENOUS
Status: DISPENSED
Start: 2018-12-20

## (undated) RX ORDER — IPRATROPIUM BROMIDE AND ALBUTEROL SULFATE 2.5; .5 MG/3ML; MG/3ML
SOLUTION RESPIRATORY (INHALATION)
Status: DISPENSED
Start: 2018-06-06

## (undated) RX ORDER — FUROSEMIDE 10 MG/ML
INJECTION INTRAMUSCULAR; INTRAVENOUS
Status: DISPENSED
Start: 2018-08-08

## (undated) RX ORDER — FENTANYL CITRATE 50 UG/ML
INJECTION, SOLUTION INTRAMUSCULAR; INTRAVENOUS
Status: DISPENSED
Start: 2018-06-06

## (undated) RX ORDER — FENTANYL CITRATE 50 UG/ML
INJECTION, SOLUTION INTRAMUSCULAR; INTRAVENOUS
Status: DISPENSED
Start: 2018-08-08

## (undated) RX ORDER — HYDROMORPHONE HYDROCHLORIDE 1 MG/ML
INJECTION, SOLUTION INTRAMUSCULAR; INTRAVENOUS; SUBCUTANEOUS
Status: DISPENSED
Start: 2018-08-08

## (undated) RX ORDER — PHENYLEPHRINE HCL IN 0.9% NACL 1 MG/10 ML
SYRINGE (ML) INTRAVENOUS
Status: DISPENSED
Start: 2018-08-08

## (undated) RX ORDER — HEPARIN SODIUM 1000 [USP'U]/ML
INJECTION, SOLUTION INTRAVENOUS; SUBCUTANEOUS
Status: DISPENSED
Start: 2018-08-08

## (undated) RX ORDER — ACETAMINOPHEN 325 MG/1
TABLET ORAL
Status: DISPENSED
Start: 2018-08-08

## (undated) RX ORDER — CLINDAMYCIN PHOSPHATE 900 MG/50ML
INJECTION, SOLUTION INTRAVENOUS
Status: DISPENSED
Start: 2018-06-06

## (undated) RX ORDER — SODIUM CHLORIDE 9 MG/ML
INJECTION, SOLUTION INTRAVENOUS
Status: DISPENSED
Start: 2018-08-08

## (undated) RX ORDER — LIDOCAINE HYDROCHLORIDE 20 MG/ML
INJECTION, SOLUTION EPIDURAL; INFILTRATION; INTRACAUDAL; PERINEURAL
Status: DISPENSED
Start: 2018-06-06

## (undated) RX ORDER — ALBUTEROL SULFATE 90 UG/1
AEROSOL, METERED RESPIRATORY (INHALATION)
Status: DISPENSED
Start: 2018-06-06

## (undated) RX ORDER — ONDANSETRON 2 MG/ML
INJECTION INTRAMUSCULAR; INTRAVENOUS
Status: DISPENSED
Start: 2018-08-08

## (undated) RX ORDER — HEPARIN SODIUM 5000 [USP'U]/.5ML
INJECTION, SOLUTION INTRAVENOUS; SUBCUTANEOUS
Status: DISPENSED
Start: 2018-08-08

## (undated) RX ORDER — FENTANYL CITRATE 50 UG/ML
INJECTION, SOLUTION INTRAMUSCULAR; INTRAVENOUS
Status: DISPENSED
Start: 2018-06-07

## (undated) RX ORDER — INDOCYANINE GREEN AND WATER 25 MG
KIT INJECTION
Status: DISPENSED
Start: 2018-08-08

## (undated) RX ORDER — PHENAZOPYRIDINE HYDROCHLORIDE 200 MG/1
TABLET, FILM COATED ORAL
Status: DISPENSED
Start: 2018-08-08

## (undated) RX ORDER — GLYCOPYRROLATE 0.2 MG/ML
INJECTION, SOLUTION INTRAMUSCULAR; INTRAVENOUS
Status: DISPENSED
Start: 2018-08-08

## (undated) RX ORDER — DEXAMETHASONE SODIUM PHOSPHATE 4 MG/ML
INJECTION, SOLUTION INTRA-ARTICULAR; INTRALESIONAL; INTRAMUSCULAR; INTRAVENOUS; SOFT TISSUE
Status: DISPENSED
Start: 2018-08-08

## (undated) RX ORDER — FENTANYL CITRATE 50 UG/ML
INJECTION, SOLUTION INTRAMUSCULAR; INTRAVENOUS
Status: DISPENSED
Start: 2018-11-01